# Patient Record
Sex: MALE | Race: WHITE | NOT HISPANIC OR LATINO | Employment: FULL TIME | URBAN - METROPOLITAN AREA
[De-identification: names, ages, dates, MRNs, and addresses within clinical notes are randomized per-mention and may not be internally consistent; named-entity substitution may affect disease eponyms.]

---

## 2017-01-12 RX ORDER — CITALOPRAM 20 MG/1
TABLET ORAL
Qty: 30 TABLET | Refills: 0 | Status: SHIPPED | OUTPATIENT
Start: 2017-01-12 | End: 2017-07-17 | Stop reason: SDUPTHER

## 2017-07-17 RX ORDER — CITALOPRAM 20 MG/1
TABLET ORAL
Qty: 30 TABLET | Refills: 0 | OUTPATIENT
Start: 2017-07-17

## 2017-07-17 RX ORDER — CITALOPRAM 20 MG/1
TABLET ORAL
Qty: 30 TABLET | Refills: 0 | Status: SHIPPED | OUTPATIENT
Start: 2017-07-17 | End: 2017-09-25 | Stop reason: SDUPTHER

## 2017-07-17 RX ORDER — ATORVASTATIN CALCIUM 10 MG/1
TABLET, FILM COATED ORAL
Qty: 90 TABLET | Refills: 0 | Status: SHIPPED | OUTPATIENT
Start: 2017-07-17 | End: 2018-11-13

## 2017-07-17 RX ORDER — ATORVASTATIN CALCIUM 10 MG/1
TABLET, FILM COATED ORAL
Qty: 90 TABLET | Refills: 0 | OUTPATIENT
Start: 2017-07-17

## 2017-07-17 RX ORDER — CARVEDILOL 25 MG/1
TABLET ORAL
Qty: 180 TABLET | Refills: 0 | OUTPATIENT
Start: 2017-07-17

## 2017-07-17 RX ORDER — CARVEDILOL 25 MG/1
TABLET ORAL
Qty: 180 TABLET | Refills: 0 | Status: SHIPPED | OUTPATIENT
Start: 2017-07-17 | End: 2018-11-13 | Stop reason: SDUPTHER

## 2017-07-17 RX ORDER — LISINOPRIL 20 MG/1
TABLET ORAL
Qty: 90 TABLET | Refills: 0 | OUTPATIENT
Start: 2017-07-17

## 2017-07-17 RX ORDER — LISINOPRIL 20 MG/1
TABLET ORAL
Qty: 90 TABLET | Refills: 0 | Status: SHIPPED | OUTPATIENT
Start: 2017-07-17 | End: 2018-04-30 | Stop reason: SDUPTHER

## 2017-08-29 ENCOUNTER — HOSPITAL ENCOUNTER (OUTPATIENT)
Dept: GENERAL RADIOLOGY | Facility: HOSPITAL | Age: 42
Discharge: HOME | End: 2017-08-29
Admitting: FAMILY MEDICINE

## 2017-08-29 ENCOUNTER — OFFICE VISIT (OUTPATIENT)
Dept: FAMILY MEDICINE CLINIC | Facility: CLINIC | Age: 42
End: 2017-08-29

## 2017-08-29 VITALS
TEMPERATURE: 98.6 F | HEIGHT: 72 IN | BODY MASS INDEX: 42.26 KG/M2 | OXYGEN SATURATION: 98 % | SYSTOLIC BLOOD PRESSURE: 134 MMHG | WEIGHT: 312 LBS | HEART RATE: 88 BPM | DIASTOLIC BLOOD PRESSURE: 88 MMHG

## 2017-08-29 DIAGNOSIS — R29.2 LEFT PATELLAR REFLEX DECREASED: ICD-10-CM

## 2017-08-29 DIAGNOSIS — M54.16 LUMBAR RADICULOPATHY: ICD-10-CM

## 2017-08-29 DIAGNOSIS — M54.41 ACUTE BILATERAL LOW BACK PAIN WITH BILATERAL SCIATICA: Primary | ICD-10-CM

## 2017-08-29 DIAGNOSIS — M54.41 ACUTE BILATERAL LOW BACK PAIN WITH BILATERAL SCIATICA: ICD-10-CM

## 2017-08-29 DIAGNOSIS — M54.42 ACUTE BILATERAL LOW BACK PAIN WITH BILATERAL SCIATICA: ICD-10-CM

## 2017-08-29 DIAGNOSIS — M54.42 ACUTE BILATERAL LOW BACK PAIN WITH BILATERAL SCIATICA: Primary | ICD-10-CM

## 2017-08-29 PROCEDURE — 72100 X-RAY EXAM L-S SPINE 2/3 VWS: CPT

## 2017-08-29 PROCEDURE — 99213 OFFICE O/P EST LOW 20 MIN: CPT | Performed by: FAMILY MEDICINE

## 2017-08-29 RX ORDER — HYDROCODONE BITARTRATE AND ACETAMINOPHEN 5; 325 MG/1; MG/1
1 TABLET ORAL EVERY 6 HOURS PRN
Qty: 30 TABLET | Refills: 0 | Status: SHIPPED | OUTPATIENT
Start: 2017-08-29 | End: 2017-09-21 | Stop reason: DRUGHIGH

## 2017-08-29 RX ORDER — METHYLPREDNISOLONE 4 MG/1
TABLET ORAL
Qty: 21 TABLET | Refills: 0 | Status: SHIPPED | OUTPATIENT
Start: 2017-08-29 | End: 2018-11-13

## 2017-08-29 RX ORDER — TIZANIDINE HYDROCHLORIDE 4 MG/1
4 CAPSULE, GELATIN COATED ORAL 3 TIMES DAILY
Qty: 30 CAPSULE | Refills: 0 | Status: SHIPPED | OUTPATIENT
Start: 2017-08-29 | End: 2018-11-13

## 2017-09-08 ENCOUNTER — HOSPITAL ENCOUNTER (OUTPATIENT)
Dept: MRI IMAGING | Facility: HOSPITAL | Age: 42
End: 2017-09-08

## 2017-09-20 ENCOUNTER — TELEPHONE (OUTPATIENT)
Dept: FAMILY MEDICINE CLINIC | Facility: CLINIC | Age: 42
End: 2017-09-20

## 2017-09-20 NOTE — TELEPHONE ENCOUNTER
Pt was seen recently for back pain, will have an MRI soon, but he is calling about his pain med and muscle relaxer that they are not helping his pain at all and wants something else for pain relief.

## 2017-09-21 RX ORDER — HYDROCODONE BITARTRATE AND ACETAMINOPHEN 10; 325 MG/1; MG/1
1 TABLET ORAL EVERY 6 HOURS PRN
Qty: 30 TABLET | Refills: 0 | Status: SHIPPED | OUTPATIENT
Start: 2017-09-21 | End: 2018-11-13

## 2017-09-25 RX ORDER — CITALOPRAM 20 MG/1
TABLET ORAL
Qty: 30 TABLET | Refills: 5 | Status: SHIPPED | OUTPATIENT
Start: 2017-09-25 | End: 2018-11-13 | Stop reason: DRUGHIGH

## 2018-04-30 RX ORDER — LISINOPRIL 20 MG/1
TABLET ORAL
Qty: 90 TABLET | Refills: 0 | Status: SHIPPED | OUTPATIENT
Start: 2018-04-30 | End: 2018-11-13 | Stop reason: SDUPTHER

## 2018-11-05 RX ORDER — CITALOPRAM 20 MG/1
TABLET ORAL
Qty: 30 TABLET | Refills: 5 | OUTPATIENT
Start: 2018-11-05

## 2018-11-05 RX ORDER — CARVEDILOL 25 MG/1
TABLET ORAL
Qty: 180 TABLET | Refills: 0 | OUTPATIENT
Start: 2018-11-05

## 2018-11-05 RX ORDER — LISINOPRIL 20 MG/1
TABLET ORAL
Qty: 90 TABLET | Refills: 0 | OUTPATIENT
Start: 2018-11-05

## 2018-11-13 ENCOUNTER — OFFICE VISIT (OUTPATIENT)
Dept: FAMILY MEDICINE CLINIC | Facility: CLINIC | Age: 43
End: 2018-11-13

## 2018-11-13 VITALS
HEART RATE: 112 BPM | TEMPERATURE: 98.2 F | OXYGEN SATURATION: 96 % | BODY MASS INDEX: 42.66 KG/M2 | WEIGHT: 315 LBS | RESPIRATION RATE: 18 BRPM | HEIGHT: 72 IN | SYSTOLIC BLOOD PRESSURE: 126 MMHG | DIASTOLIC BLOOD PRESSURE: 82 MMHG

## 2018-11-13 DIAGNOSIS — E66.01 MORBID EXOGENOUS OBESITY (HCC): ICD-10-CM

## 2018-11-13 DIAGNOSIS — E55.9 VITAMIN D DEFICIENCY: ICD-10-CM

## 2018-11-13 DIAGNOSIS — F41.8 DEPRESSION WITH ANXIETY: ICD-10-CM

## 2018-11-13 DIAGNOSIS — J01.00 ACUTE NON-RECURRENT MAXILLARY SINUSITIS: ICD-10-CM

## 2018-11-13 DIAGNOSIS — I10 HTN (HYPERTENSION), BENIGN: Primary | ICD-10-CM

## 2018-11-13 DIAGNOSIS — E78.2 MIXED HYPERLIPIDEMIA: ICD-10-CM

## 2018-11-13 DIAGNOSIS — E11.65 UNCONTROLLED TYPE 2 DIABETES MELLITUS WITH HYPERGLYCEMIA (HCC): ICD-10-CM

## 2018-11-13 PROCEDURE — 99214 OFFICE O/P EST MOD 30 MIN: CPT | Performed by: FAMILY MEDICINE

## 2018-11-13 RX ORDER — LISINOPRIL 20 MG/1
20 TABLET ORAL DAILY
Qty: 90 TABLET | Refills: 1 | Status: SHIPPED | OUTPATIENT
Start: 2018-11-13 | End: 2019-08-16 | Stop reason: SDUPTHER

## 2018-11-13 RX ORDER — AMOXICILLIN AND CLAVULANATE POTASSIUM 875; 125 MG/1; MG/1
1 TABLET, FILM COATED ORAL EVERY 12 HOURS SCHEDULED
Qty: 20 TABLET | Refills: 0 | Status: SHIPPED | OUTPATIENT
Start: 2018-11-13 | End: 2019-08-22

## 2018-11-13 RX ORDER — CARVEDILOL 25 MG/1
25 TABLET ORAL 2 TIMES DAILY
Qty: 180 TABLET | Refills: 1 | Status: SHIPPED | OUTPATIENT
Start: 2018-11-13 | End: 2019-08-16 | Stop reason: SDUPTHER

## 2018-11-13 RX ORDER — CITALOPRAM 40 MG/1
40 TABLET ORAL DAILY
Qty: 30 TABLET | Refills: 5 | Status: SHIPPED | OUTPATIENT
Start: 2018-11-13 | End: 2019-08-16 | Stop reason: SDUPTHER

## 2018-11-13 NOTE — PROGRESS NOTES
Subjective   Santiago Zamora is a 43 y.o. male with   Chief Complaint   Patient presents with   • Med Refill     look over med list unsure of what he is really taking   • Sinusitis     x's 3 days   .    History of Present Illness     44 yo white male who presents with complaints of head congestion and nosebleeds for the last few days.  Especially out of the left nares.  He states it does tend to happen this time of the year.  He only cough's occasionally and he has had no fever.  Pt's blood pressure is stable today.  He has a history of morbid exogenous obesity, he has gained 5 pounds since he was last seen one year ago.  Pt has not had fasting labs since June of 2015.      The following portions of the patient's history were reviewed and updated as appropriate: allergies, current medications, past family history, past medical history, past social history, past surgical history and problem list.    Review of Systems   Constitutional: Negative for fever.        Morbid obesity   HENT: Positive for congestion, nosebleeds, postnasal drip, sinus pressure and sore throat.    Respiratory: Positive for cough.    Cardiovascular:        Essential hypertension   Endocrine:        Type II non-insulin-dependent diabetes mellitus   Psychiatric/Behavioral: Positive for dysphoric mood. The patient is nervous/anxious.    All other systems reviewed and are negative.      Objective     Vitals:    11/13/18 1544   BP: 126/82   Pulse: 112   Resp: 18   Temp: 98.2 °F (36.8 °C)   SpO2: 96%     BP Readings from Last 3 Encounters:   11/13/18 126/82   08/29/17 134/88   03/02/16 140/80      Wt Readings from Last 3 Encounters:   11/13/18 (!) 144 kg (317 lb)   08/29/17 (!) 142 kg (312 lb)   03/02/16 (!) 145 kg (320 lb)        No results found for this or any previous visit (from the past 168 hour(s)).    Physical Exam   Constitutional: He is oriented to person, place, and time. Vital signs are normal. He appears well-developed and well-nourished.  He is cooperative.   morbidly obese   HENT:   Head: Normocephalic and atraumatic.   Right Ear: Hearing, tympanic membrane, external ear and ear canal normal.   Left Ear: Hearing, tympanic membrane, external ear and ear canal normal.   Nose: Mucosal edema (bilateral turbinate edema as well as increasederythema  with no recent bleeding observed.) present.   Mouth/Throat: Uvula is midline and mucous membranes are normal. Oropharyngeal exudate and posterior oropharyngeal erythema present.   Neck: Trachea normal and phonation normal. Neck supple. Normal carotid pulses present. Carotid bruit is not present. No thyroid mass and no thyromegaly present.   Cardiovascular: Normal rate, regular rhythm and normal heart sounds. Exam reveals no gallop and no friction rub.   No murmur heard.  Pulmonary/Chest: Effort normal and breath sounds normal. No respiratory distress. He has no decreased breath sounds. He has no wheezes. He has no rhonchi. He has no rales.   Lymphadenopathy:     He has no cervical adenopathy.   Neurological: He is alert and oriented to person, place, and time.   Skin: Skin is warm and dry. No rash noted.   Psychiatric: He has a normal mood and affect. His speech is normal and behavior is normal. Judgment and thought content normal. Cognition and memory are normal.   Nursing note and vitals reviewed.      Assessment/Plan   Santiago was seen today for med refill and sinusitis.    Diagnoses and all orders for this visit:    HTN (hypertension), benign  -     carvedilol (COREG) 25 MG tablet; Take 1 tablet by mouth 2 (Two) Times a Day.  -     lisinopril (PRINIVIL,ZESTRIL) 20 MG tablet; Take 1 tablet by mouth Daily.    Mixed hyperlipidemia  -     CBC & Differential  -     Comprehensive Metabolic Panel  -     Lipid Panel    Uncontrolled type 2 diabetes mellitus with hyperglycemia (CMS/HCC)  -     dapagliflozin (FARXIGA) 5 MG tablet tablet; Take 1 tablet by mouth Daily.  -     CBC & Differential  -     Comprehensive  Metabolic Panel  -     Hemoglobin A1c  -     SITagliptin (JANUVIA) 100 MG tablet; Take 1 tablet by mouth Daily.    Vitamin D deficiency  -     Vitamin D 25 Hydroxy    Depression with anxiety  -     citalopram (CELEXA) 40 MG tablet; Take 1 tablet by mouth Daily.    Morbid exogenous obesity (CMS/HCC)    Acute non-recurrent maxillary sinusitis  -     amoxicillin-clavulanate (AUGMENTIN) 875-125 MG per tablet; Take 1 tablet by mouth Every 12 (Twelve) Hours.        Return in about 6 months (around 5/13/2019).    Scribed for Quinn Stoner MD by Nimco Morales CMA. 11/13/2018    I, Quinn Stoner MD personally performed the services described in this documentation, as scribed by Nimco Morales CMA in my presence, and it is both accurate and complete

## 2018-11-14 LAB
25(OH)D3+25(OH)D2 SERPL-MCNC: 12.2 NG/ML
ALBUMIN SERPL-MCNC: 4.4 G/DL (ref 3.5–5.2)
ALBUMIN/GLOB SERPL: 1.6 G/DL
ALP SERPL-CCNC: 67 U/L (ref 40–129)
ALT SERPL-CCNC: 18 U/L (ref 5–41)
AST SERPL-CCNC: 10 U/L (ref 5–40)
BASOPHILS # BLD AUTO: 0.05 10*3/MM3 (ref 0–0.2)
BASOPHILS NFR BLD AUTO: 0.6 % (ref 0–2)
BILIRUB SERPL-MCNC: 0.4 MG/DL (ref 0.2–1.2)
BUN SERPL-MCNC: 12 MG/DL (ref 6–20)
BUN/CREAT SERPL: 15 (ref 7–25)
CALCIUM SERPL-MCNC: 9.7 MG/DL (ref 8.6–10.5)
CHLORIDE SERPL-SCNC: 95 MMOL/L (ref 98–107)
CHOLEST SERPL-MCNC: 167 MG/DL (ref 0–200)
CO2 SERPL-SCNC: 31.1 MMOL/L (ref 22–29)
CREAT SERPL-MCNC: 0.8 MG/DL (ref 0.76–1.27)
EOSINOPHIL # BLD AUTO: 0.34 10*3/MM3 (ref 0.1–0.3)
EOSINOPHIL NFR BLD AUTO: 4.2 % (ref 0–4)
ERYTHROCYTE [DISTWIDTH] IN BLOOD BY AUTOMATED COUNT: 15 % (ref 11.5–14.5)
GLOBULIN SER CALC-MCNC: 2.7 GM/DL
GLUCOSE SERPL-MCNC: 284 MG/DL (ref 65–99)
HBA1C MFR BLD: 9.9 % (ref 4.8–5.6)
HCT VFR BLD AUTO: 49.1 % (ref 42–52)
HDLC SERPL-MCNC: 35 MG/DL (ref 40–60)
HGB BLD-MCNC: 15.7 G/DL (ref 14–18)
IMM GRANULOCYTES # BLD: 0.03 10*3/MM3 (ref 0–0.03)
IMM GRANULOCYTES NFR BLD: 0.4 % (ref 0–0.5)
LDLC SERPL CALC-MCNC: 100 MG/DL (ref 0–100)
LYMPHOCYTES # BLD AUTO: 1.66 10*3/MM3 (ref 0.6–4.8)
LYMPHOCYTES NFR BLD AUTO: 20.3 % (ref 20–45)
MCH RBC QN AUTO: 26.4 PG (ref 27–31)
MCHC RBC AUTO-ENTMCNC: 32 G/DL (ref 31–37)
MCV RBC AUTO: 82.7 FL (ref 80–94)
MONOCYTES # BLD AUTO: 0.54 10*3/MM3 (ref 0–1)
MONOCYTES NFR BLD AUTO: 6.6 % (ref 3–8)
NEUTROPHILS # BLD AUTO: 5.54 10*3/MM3 (ref 1.5–8.3)
NEUTROPHILS NFR BLD AUTO: 67.9 % (ref 45–70)
NRBC BLD AUTO-RTO: 0 /100 WBC (ref 0–0)
PLATELET # BLD AUTO: 215 10*3/MM3 (ref 140–500)
POTASSIUM SERPL-SCNC: 5.3 MMOL/L (ref 3.5–5.2)
PROT SERPL-MCNC: 7.1 G/DL (ref 6–8.5)
RBC # BLD AUTO: 5.94 10*6/MM3 (ref 4.7–6.1)
SODIUM SERPL-SCNC: 136 MMOL/L (ref 136–145)
TRIGL SERPL-MCNC: 162 MG/DL (ref 0–150)
VLDLC SERPL CALC-MCNC: 32.4 MG/DL (ref 8–32)
WBC # BLD AUTO: 8.16 10*3/MM3 (ref 4.8–10.8)

## 2019-08-16 DIAGNOSIS — F41.8 DEPRESSION WITH ANXIETY: ICD-10-CM

## 2019-08-16 DIAGNOSIS — I10 HTN (HYPERTENSION), BENIGN: ICD-10-CM

## 2019-08-16 RX ORDER — LISINOPRIL 20 MG/1
TABLET ORAL
Qty: 90 TABLET | Refills: 0 | Status: SHIPPED | OUTPATIENT
Start: 2019-08-16 | End: 2019-08-22

## 2019-08-16 RX ORDER — CARVEDILOL 25 MG/1
TABLET ORAL
Qty: 180 TABLET | Refills: 0 | Status: SHIPPED | OUTPATIENT
Start: 2019-08-16 | End: 2020-03-25 | Stop reason: SDUPTHER

## 2019-08-16 RX ORDER — CITALOPRAM 40 MG/1
TABLET ORAL
Qty: 90 TABLET | Refills: 0 | Status: SHIPPED | OUTPATIENT
Start: 2019-08-16 | End: 2020-02-24

## 2019-08-22 ENCOUNTER — OFFICE VISIT (OUTPATIENT)
Dept: FAMILY MEDICINE CLINIC | Facility: CLINIC | Age: 44
End: 2019-08-22

## 2019-08-22 ENCOUNTER — HOSPITAL ENCOUNTER (OUTPATIENT)
Dept: GENERAL RADIOLOGY | Facility: HOSPITAL | Age: 44
Discharge: HOME | End: 2019-08-22
Admitting: FAMILY MEDICINE

## 2019-08-22 VITALS
WEIGHT: 306 LBS | BODY MASS INDEX: 41.45 KG/M2 | HEIGHT: 72 IN | SYSTOLIC BLOOD PRESSURE: 130 MMHG | OXYGEN SATURATION: 87 % | HEART RATE: 87 BPM | DIASTOLIC BLOOD PRESSURE: 84 MMHG

## 2019-08-22 DIAGNOSIS — M25.551 PAIN OF RIGHT HIP JOINT: Primary | ICD-10-CM

## 2019-08-22 DIAGNOSIS — M54.16 LUMBAR RADICULOPATHY: ICD-10-CM

## 2019-08-22 DIAGNOSIS — E66.01 MORBID EXOGENOUS OBESITY (HCC): ICD-10-CM

## 2019-08-22 DIAGNOSIS — M51.36 DDD (DEGENERATIVE DISC DISEASE), LUMBAR: ICD-10-CM

## 2019-08-22 PROCEDURE — 99213 OFFICE O/P EST LOW 20 MIN: CPT | Performed by: FAMILY MEDICINE

## 2019-08-22 PROCEDURE — 73502 X-RAY EXAM HIP UNI 2-3 VIEWS: CPT

## 2019-08-22 RX ORDER — LISINOPRIL AND HYDROCHLOROTHIAZIDE 20; 12.5 MG/1; MG/1
1 TABLET ORAL DAILY
COMMUNITY
End: 2019-08-22

## 2019-08-22 RX ORDER — SPIRONOLACTONE 25 MG/1
25 TABLET ORAL DAILY
COMMUNITY
End: 2019-08-22

## 2019-08-22 RX ORDER — CYCLOBENZAPRINE HCL 10 MG
10 TABLET ORAL 3 TIMES DAILY PRN
Qty: 30 TABLET | Refills: 0 | Status: SHIPPED | OUTPATIENT
Start: 2019-08-22 | End: 2020-03-25 | Stop reason: SDUPTHER

## 2019-08-22 RX ORDER — LISINOPRIL 20 MG/1
20 TABLET ORAL DAILY
COMMUNITY
End: 2020-02-24

## 2019-08-22 NOTE — PATIENT INSTRUCTIONS
Right hip x-rays will be obtained and if found to be unremarkable will consider MRI of the lumbar spine.  If MRI results in an unremarkable reading will consider nerve conduction studies of lower extremities.  If x-rays of the right hip are positive we will discuss orthopedic referral for same.

## 2019-08-22 NOTE — PROGRESS NOTES
Subjective   Santiago Zamora is a 44 y.o. male with   Chief Complaint   Patient presents with   • Back Pain     radiates into both legs, right is worse then left   .    History of Present Illness   44-year-old white male with low back pain with radiation into the legs bilaterally.  Right is much worse than left.  Right radicular symptoms are for the most part in the proximal inguinal area including right hip and extends into the right anterior thigh region.  At times this can also radiate distally down towards right ankle and foot.  Left radicular pain into the anterior lateral portion of the left thigh.  Patient has not had any recent trauma or initiating event but does have a past history dating to before 2017 of low back issues.  X-rays in 2017 of the lumbar spine with mild degeneration.  MRI was ordered in August 2017 and not performed for some reason.  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  Apparently the setting has not helped in this regard.  He does request some sort of muscle relaxer to help with his symptoms.  The following portions of the patient's history were reviewed and updated as appropriate: allergies, current medications, past family history, past medical history, past social history, past surgical history and problem list.    Review of Systems   Musculoskeletal: Positive for arthralgias, back pain and gait problem.       Objective     Vitals:    08/22/19 0933   BP: 130/84   Pulse: 87   SpO2: (!) 87%       No results found for this or any previous visit (from the past 672 hour(s)).    Physical Exam   Constitutional: He is oriented to person, place, and time. He appears well-developed and well-nourished.   Morbid exogenous obesity   HENT:   Head: Normocephalic and atraumatic.   Neck: Neck supple.   Musculoskeletal:   LS spine with generalized tenderness in the L4-S1 region with palpation.  Range of motion is limited in all  planes secondary to pain.  No evidence of SI tenderness or sciatic notch tenderness.  Bilateral lower extremities with good tone, motor 5/5 and neurovascular intact distally.  Straight leg raising is negative with the exclusion of some low back pain at 60 degrees bilaterally.  Mook is full with marked extreme tenderness on the right.   Neurological: He is alert and oriented to person, place, and time. He has normal strength and normal reflexes. No sensory deficit. Gait normal.   Skin: Skin is warm and dry. No rash noted.   Psychiatric: He has a normal mood and affect. His speech is normal and behavior is normal. Judgment and thought content normal. Cognition and memory are normal.   Nursing note and vitals reviewed.      Assessment/Plan   Santiago was seen today for back pain.    Diagnoses and all orders for this visit:    Pain of right hip joint  -     XR Hip With or Without Pelvis 2 - 3 View Right  -     cyclobenzaprine (FLEXERIL) 10 MG tablet; Take 1 tablet by mouth 3 (Three) Times a Day As Needed for Muscle Spasms.    DDD (degenerative disc disease), lumbar    Lumbar radiculopathy    Morbid exogenous obesity (CMS/HCC)        Return if symptoms worsen or fail to improve.

## 2019-08-27 DIAGNOSIS — M16.0 ARTHRITIS OF BOTH HIPS: Primary | ICD-10-CM

## 2020-02-24 DIAGNOSIS — F41.8 DEPRESSION WITH ANXIETY: ICD-10-CM

## 2020-02-24 RX ORDER — CITALOPRAM 40 MG/1
TABLET ORAL
Qty: 30 TABLET | Refills: 0 | Status: SHIPPED | OUTPATIENT
Start: 2020-02-24 | End: 2020-03-25 | Stop reason: SDUPTHER

## 2020-02-24 RX ORDER — LISINOPRIL 20 MG/1
TABLET ORAL
Qty: 30 TABLET | Refills: 0 | Status: SHIPPED | OUTPATIENT
Start: 2020-02-24 | End: 2020-03-25 | Stop reason: SDUPTHER

## 2020-03-17 ENCOUNTER — TELEPHONE (OUTPATIENT)
Dept: FAMILY MEDICINE CLINIC | Facility: CLINIC | Age: 45
End: 2020-03-17

## 2020-03-18 DIAGNOSIS — E55.9 VITAMIN D DEFICIENCY: ICD-10-CM

## 2020-03-18 DIAGNOSIS — E11.65 UNCONTROLLED TYPE 2 DIABETES MELLITUS WITH HYPERGLYCEMIA (HCC): ICD-10-CM

## 2020-03-18 DIAGNOSIS — E78.2 MIXED HYPERLIPIDEMIA: Primary | ICD-10-CM

## 2020-03-18 LAB
25(OH)D3+25(OH)D2 SERPL-MCNC: 9.6 NG/ML (ref 30–100)
ALBUMIN SERPL-MCNC: 4.5 G/DL (ref 3.5–5.2)
ALBUMIN/GLOB SERPL: 1.7 G/DL
ALP SERPL-CCNC: 67 U/L (ref 39–117)
ALT SERPL-CCNC: 22 U/L (ref 1–41)
AST SERPL-CCNC: 13 U/L (ref 1–40)
BASOPHILS # BLD AUTO: 0.07 10*3/MM3 (ref 0–0.2)
BASOPHILS NFR BLD AUTO: 1 % (ref 0–1.5)
BILIRUB SERPL-MCNC: 0.4 MG/DL (ref 0.2–1.2)
BUN SERPL-MCNC: 16 MG/DL (ref 6–20)
BUN/CREAT SERPL: 18.4 (ref 7–25)
CALCIUM SERPL-MCNC: 10 MG/DL (ref 8.6–10.5)
CHLORIDE SERPL-SCNC: 96 MMOL/L (ref 98–107)
CHOLEST SERPL-MCNC: 231 MG/DL (ref 0–200)
CO2 SERPL-SCNC: 28.6 MMOL/L (ref 22–29)
CREAT SERPL-MCNC: 0.87 MG/DL (ref 0.76–1.27)
EOSINOPHIL # BLD AUTO: 0.29 10*3/MM3 (ref 0–0.4)
EOSINOPHIL NFR BLD AUTO: 4 % (ref 0.3–6.2)
ERYTHROCYTE [DISTWIDTH] IN BLOOD BY AUTOMATED COUNT: 14.7 % (ref 12.3–15.4)
GLOBULIN SER CALC-MCNC: 2.6 GM/DL
GLUCOSE SERPL-MCNC: 216 MG/DL (ref 65–99)
HBA1C MFR BLD: 10.1 % (ref 4.8–5.6)
HCT VFR BLD AUTO: 49.3 % (ref 37.5–51)
HDLC SERPL-MCNC: 30 MG/DL (ref 40–60)
HGB BLD-MCNC: 16.4 G/DL (ref 13–17.7)
IMM GRANULOCYTES # BLD AUTO: 0.05 10*3/MM3 (ref 0–0.05)
IMM GRANULOCYTES NFR BLD AUTO: 0.7 % (ref 0–0.5)
LDLC SERPL CALC-MCNC: 126 MG/DL (ref 0–100)
LYMPHOCYTES # BLD AUTO: 1.47 10*3/MM3 (ref 0.7–3.1)
LYMPHOCYTES NFR BLD AUTO: 20.2 % (ref 19.6–45.3)
MCH RBC QN AUTO: 26.8 PG (ref 26.6–33)
MCHC RBC AUTO-ENTMCNC: 33.3 G/DL (ref 31.5–35.7)
MCV RBC AUTO: 80.7 FL (ref 79–97)
MONOCYTES # BLD AUTO: 0.59 10*3/MM3 (ref 0.1–0.9)
MONOCYTES NFR BLD AUTO: 8.1 % (ref 5–12)
NEUTROPHILS # BLD AUTO: 4.79 10*3/MM3 (ref 1.7–7)
NEUTROPHILS NFR BLD AUTO: 66 % (ref 42.7–76)
NRBC BLD AUTO-RTO: 0 /100 WBC (ref 0–0.2)
PLATELET # BLD AUTO: 209 10*3/MM3 (ref 140–450)
POTASSIUM SERPL-SCNC: 4.9 MMOL/L (ref 3.5–5.2)
PROT SERPL-MCNC: 7.1 G/DL (ref 6–8.5)
RBC # BLD AUTO: 6.11 10*6/MM3 (ref 4.14–5.8)
SODIUM SERPL-SCNC: 137 MMOL/L (ref 136–145)
TRIGL SERPL-MCNC: 373 MG/DL (ref 0–150)
UNABLE TO VOID: NORMAL
VLDLC SERPL CALC-MCNC: 74.6 MG/DL
WBC # BLD AUTO: 7.26 10*3/MM3 (ref 3.4–10.8)

## 2020-03-25 ENCOUNTER — OFFICE VISIT (OUTPATIENT)
Dept: FAMILY MEDICINE CLINIC | Facility: CLINIC | Age: 45
End: 2020-03-25

## 2020-03-25 VITALS
BODY MASS INDEX: 41.04 KG/M2 | OXYGEN SATURATION: 98 % | WEIGHT: 303 LBS | SYSTOLIC BLOOD PRESSURE: 134 MMHG | HEART RATE: 92 BPM | HEIGHT: 72 IN | DIASTOLIC BLOOD PRESSURE: 80 MMHG

## 2020-03-25 DIAGNOSIS — E66.01 MORBID EXOGENOUS OBESITY (HCC): ICD-10-CM

## 2020-03-25 DIAGNOSIS — E78.2 MIXED HYPERLIPIDEMIA: ICD-10-CM

## 2020-03-25 DIAGNOSIS — M79.10 MYALGIA: ICD-10-CM

## 2020-03-25 DIAGNOSIS — I10 BENIGN ESSENTIAL HTN: ICD-10-CM

## 2020-03-25 DIAGNOSIS — M12.9 ARTHRITIS, MULTIPLE JOINT INVOLVEMENT: ICD-10-CM

## 2020-03-25 DIAGNOSIS — E11.65 UNCONTROLLED TYPE 2 DIABETES MELLITUS WITH HYPERGLYCEMIA (HCC): Primary | ICD-10-CM

## 2020-03-25 DIAGNOSIS — M25.551 PAIN OF RIGHT HIP JOINT: ICD-10-CM

## 2020-03-25 DIAGNOSIS — F41.8 DEPRESSION WITH ANXIETY: ICD-10-CM

## 2020-03-25 DIAGNOSIS — E55.9 VITAMIN D DEFICIENCY: ICD-10-CM

## 2020-03-25 DIAGNOSIS — I10 HTN (HYPERTENSION), BENIGN: ICD-10-CM

## 2020-03-25 PROCEDURE — 99214 OFFICE O/P EST MOD 30 MIN: CPT | Performed by: FAMILY MEDICINE

## 2020-03-25 RX ORDER — LISINOPRIL 20 MG/1
20 TABLET ORAL DAILY
Qty: 90 TABLET | Refills: 1 | Status: SHIPPED | OUTPATIENT
Start: 2020-03-25 | End: 2020-12-14

## 2020-03-25 RX ORDER — CYCLOBENZAPRINE HCL 10 MG
10 TABLET ORAL 3 TIMES DAILY PRN
Qty: 30 TABLET | Refills: 0 | Status: SHIPPED | OUTPATIENT
Start: 2020-03-25 | End: 2021-08-06

## 2020-03-25 RX ORDER — BLOOD-GLUCOSE METER
KIT MISCELLANEOUS
Qty: 1 EACH | Refills: 0 | Status: SHIPPED | OUTPATIENT
Start: 2020-03-25 | End: 2022-05-17 | Stop reason: SDUPTHER

## 2020-03-25 RX ORDER — ATORVASTATIN CALCIUM 10 MG/1
10 TABLET, FILM COATED ORAL DAILY
Qty: 90 TABLET | Refills: 1 | Status: SHIPPED | OUTPATIENT
Start: 2020-03-25 | End: 2021-04-12

## 2020-03-25 RX ORDER — CARVEDILOL 25 MG/1
25 TABLET ORAL 2 TIMES DAILY
Qty: 180 TABLET | Refills: 1 | Status: SHIPPED | OUTPATIENT
Start: 2020-03-25 | End: 2020-12-14

## 2020-03-25 RX ORDER — BLOOD-GLUCOSE METER
KIT MISCELLANEOUS
Qty: 1 EACH | Refills: 0 | Status: SHIPPED | OUTPATIENT
Start: 2020-03-25 | End: 2020-03-25 | Stop reason: SDUPTHER

## 2020-03-25 RX ORDER — CITALOPRAM 40 MG/1
40 TABLET ORAL DAILY
Qty: 90 TABLET | Refills: 1 | Status: SHIPPED | OUTPATIENT
Start: 2020-03-25 | End: 2020-12-14

## 2020-03-25 NOTE — PROGRESS NOTES
Subjective   Santiago Zamora is a 45 y.o. male with   Chief Complaint   Patient presents with   • Diabetes   • Generalized Body Aches   .    History of Present Illness   45-year-old white male with ongoing history of type II non-insulin-dependent diabetes mellitus, hypertension and morbid exogenous obesity.  Current medications include 81 mg aspirin daily, carvedilol, citalopram, Farxiga, lisinopril, and sitagliptin.  Patient is intolerant of metformin.  He is relatively noncompliant in regards to diet and exercise and also with acquiring laboratory studies and follow-up in this office.  He does complain of generalized myalgias and is concerned that he may have fibromyalgia.  His mother apparently has been severe with this problem and is actually bedridden.  These myalgias have been present over several years and to date there has not been a formal evaluation.  Fasting labs have been acquired prior to this visit.  Current medications are tolerated without side effects and used on a regular basis.  The following portions of the patient's history were reviewed and updated as appropriate: allergies, current medications, past family history, past medical history, past social history, past surgical history and problem list.    Review of Systems   Constitutional:        Morbidly obese   Cardiovascular:        Hypertension   Endocrine:        Type II non-insulin-dependent diabetes mellitus   Musculoskeletal: Positive for myalgias.       Objective     Vitals:    03/25/20 0736   BP: 134/80   Pulse: 92   SpO2: 98%       Recent Results (from the past 672 hour(s))   CBC & Differential    Collection Time: 03/18/20  9:01 AM   Result Value Ref Range    WBC 7.26 3.40 - 10.80 10*3/mm3    RBC 6.11 (H) 4.14 - 5.80 10*6/mm3    Hemoglobin 16.4 13.0 - 17.7 g/dL    Hematocrit 49.3 37.5 - 51.0 %    MCV 80.7 79.0 - 97.0 fL    MCH 26.8 26.6 - 33.0 pg    MCHC 33.3 31.5 - 35.7 g/dL    RDW 14.7 12.3 - 15.4 %    Platelets 209 140 - 450 10*3/mm3     Neutrophil Rel % 66.0 42.7 - 76.0 %    Lymphocyte Rel % 20.2 19.6 - 45.3 %    Monocyte Rel % 8.1 5.0 - 12.0 %    Eosinophil Rel % 4.0 0.3 - 6.2 %    Basophil Rel % 1.0 0.0 - 1.5 %    Neutrophils Absolute 4.79 1.70 - 7.00 10*3/mm3    Lymphocytes Absolute 1.47 0.70 - 3.10 10*3/mm3    Monocytes Absolute 0.59 0.10 - 0.90 10*3/mm3    Eosinophils Absolute 0.29 0.00 - 0.40 10*3/mm3    Basophils Absolute 0.07 0.00 - 0.20 10*3/mm3    Immature Granulocyte Rel % 0.7 (H) 0.0 - 0.5 %    Immature Grans Absolute 0.05 0.00 - 0.05 10*3/mm3    nRBC 0.0 0.0 - 0.2 /100 WBC   Comprehensive Metabolic Panel    Collection Time: 03/18/20  9:01 AM   Result Value Ref Range    Glucose 216 (H) 65 - 99 mg/dL    BUN 16 6 - 20 mg/dL    Creatinine 0.87 0.76 - 1.27 mg/dL    eGFR Non African Am 96 >60 mL/min/1.73    eGFR African Am 116 >60 mL/min/1.73    BUN/Creatinine Ratio 18.4 7.0 - 25.0    Sodium 137 136 - 145 mmol/L    Potassium 4.9 3.5 - 5.2 mmol/L    Chloride 96 (L) 98 - 107 mmol/L    Total CO2 28.6 22.0 - 29.0 mmol/L    Calcium 10.0 8.6 - 10.5 mg/dL    Total Protein 7.1 6.0 - 8.5 g/dL    Albumin 4.50 3.50 - 5.20 g/dL    Globulin 2.6 gm/dL    A/G Ratio 1.7 g/dL    Total Bilirubin 0.4 0.2 - 1.2 mg/dL    Alkaline Phosphatase 67 39 - 117 U/L    AST (SGOT) 13 1 - 40 U/L    ALT (SGPT) 22 1 - 41 U/L   Hemoglobin A1c    Collection Time: 03/18/20  9:01 AM   Result Value Ref Range    Hemoglobin A1C 10.10 (H) 4.80 - 5.60 %   Lipid Panel    Collection Time: 03/18/20  9:01 AM   Result Value Ref Range    Total Cholesterol 231 (H) 0 - 200 mg/dL    Triglycerides 373 (H) 0 - 150 mg/dL    HDL Cholesterol 30 (L) 40 - 60 mg/dL    VLDL Cholesterol 74.6 mg/dL    LDL Cholesterol  126 (H) 0 - 100 mg/dL   Vitamin D 25 Hydroxy    Collection Time: 03/18/20  9:01 AM   Result Value Ref Range    25 Hydroxy, Vitamin D 9.6 (L) 30.0 - 100.0 ng/ml   Unable To Void    Collection Time: 03/18/20  9:01 AM   Result Value Ref Range    Unable to Void Comment        Physical Exam    Constitutional: He is oriented to person, place, and time. He appears well-developed and well-nourished.   HENT:   Head: Normocephalic and atraumatic.   Neck: Trachea normal and phonation normal. Neck supple. Normal carotid pulses present. Carotid bruit is not present. No thyroid mass and no thyromegaly present.   Cardiovascular: Normal rate, regular rhythm and normal heart sounds. Exam reveals no gallop and no friction rub.   No murmur heard.  Pulmonary/Chest: Effort normal and breath sounds normal. No respiratory distress. He has no decreased breath sounds. He has no wheezes. He has no rhonchi. He has no rales.   Lymphadenopathy:     He has no cervical adenopathy.   Neurological: He is alert and oriented to person, place, and time.   Skin: Skin is warm and dry. No rash noted.   Psychiatric: He has a normal mood and affect. His speech is normal and behavior is normal. Judgment and thought content normal. Cognition and memory are normal.   Nursing note and vitals reviewed.      Assessment/Plan   Santiago was seen today for diabetes and generalized body aches.    Diagnoses and all orders for this visit:    Uncontrolled type 2 diabetes mellitus with hyperglycemia (CMS/HCC)  -     CK  -     Uric acid  -     RPR  -     LINDSEY  -     Hepatitis C antibody  -     Rheumatoid Factor, Quant  -     Sedimentation Rate  -     Insulin Glargine-Lixisenatide (Soliqua) 100-33 UNT-MCG/ML solution pen-injector injection; 15 units daily for 1 week, with titration by 2 to 4 units/week if not to goal.  -     glucose monitor monitoring kit; Use accu-chek Rebecca glucometer to check your blood sugar once daily  -     glucose blood (Accu-Chek Rebecca) test strip; Test blood sugar once daily as instructed  -     1st Choice Lancets Super Thin misc; USE TO TEST GLUCOSE DAILY    Morbid exogenous obesity (CMS/HCC)  -     CK  -     Uric acid  -     RPR  -     LINDSEY  -     Hepatitis C antibody  -     Rheumatoid Factor, Quant  -     Sedimentation  Rate    Vitamin D deficiency  -     CK  -     Uric acid  -     RPR  -     LINDSEY  -     Hepatitis C antibody  -     Rheumatoid Factor, Quant  -     Sedimentation Rate    Benign essential HTN  -     CK  -     Uric acid  -     RPR  -     LINDSEY  -     Hepatitis C antibody  -     Rheumatoid Factor, Quant  -     Sedimentation Rate  -     lisinopril (PRINIVIL,ZESTRIL) 20 MG tablet; Take 1 tablet by mouth Daily.    Depression with anxiety  -     CK  -     Uric acid  -     RPR  -     LINDSEY  -     Hepatitis C antibody  -     Rheumatoid Factor, Quant  -     Sedimentation Rate  -     citalopram (CeleXA) 40 MG tablet; Take 1 tablet by mouth Daily.    Mixed hyperlipidemia  -     atorvastatin (LIPITOR) 10 MG tablet; Take 1 tablet by mouth Daily.  -     CK  -     Uric acid  -     RPR  -     LINDSEY  -     Hepatitis C antibody  -     Rheumatoid Factor, Quant  -     Sedimentation Rate    Myalgia  -     CK  -     Uric acid  -     RPR  -     LINDSEY  -     Hepatitis C antibody  -     Rheumatoid Factor, Quant  -     Sedimentation Rate    Arthritis, multiple joint involvement  -     CK  -     Uric acid  -     RPR  -     LINDSEY  -     Hepatitis C antibody  -     Rheumatoid Factor, Quant  -     Sedimentation Rate    Pain of right hip joint  -     cyclobenzaprine (FLEXERIL) 10 MG tablet; Take 1 tablet by mouth 3 (Three) Times a Day As Needed for Muscle Spasms.    HTN (hypertension), benign  -     carvedilol (COREG) 25 MG tablet; Take 1 tablet by mouth 2 (Two) Times a Day.    Soliqua will be added to patient's regimen.  Januvia will be continued initially with eventual discontinuation when Soliqua has been titrated to goal.  All other medications will be continued without alteration.  Patient has been asked to check fasting blood sugars by glucometer 2-3 times a week with follow-up with me in 4 weeks with these numbers.  Rheumatologic labs have been drawn on this date nonfasting.    Return in about 4 weeks (around 4/22/2020) for Recheck.

## 2020-03-26 LAB
ANA SER QL: NEGATIVE
CK SERPL-CCNC: 67 U/L (ref 20–200)
ERYTHROCYTE [SEDIMENTATION RATE] IN BLOOD BY WESTERGREN METHOD: 4 MM/HR (ref 0–15)
HCV AB S/CO SERPL IA: <0.1 S/CO RATIO (ref 0–0.9)
RHEUMATOID FACT SERPL-ACNC: <10 IU/ML (ref 0–13.9)
RPR SER QL: NORMAL
URATE SERPL-MCNC: 6.2 MG/DL (ref 3.4–7)

## 2020-04-29 ENCOUNTER — TELEPHONE (OUTPATIENT)
Dept: FAMILY MEDICINE CLINIC | Facility: CLINIC | Age: 45
End: 2020-04-29

## 2020-04-29 NOTE — TELEPHONE ENCOUNTER
Okay to give but he did miss a video/telephone visit within the last week.  This needs to be made up.

## 2020-04-29 NOTE — TELEPHONE ENCOUNTER
PT CALLED WANTING MORE SAMPLES OF JANUVIA AND FOR THE INSULIN PIN. PT ALSO DOESN'T HAVE NEEDLES FOR PINS.    PLEASE ADVISE     PT CALL BACK  [phone]

## 2020-04-30 ENCOUNTER — OFFICE VISIT (OUTPATIENT)
Dept: FAMILY MEDICINE CLINIC | Facility: CLINIC | Age: 45
End: 2020-04-30

## 2020-04-30 DIAGNOSIS — E11.65 UNCONTROLLED TYPE 2 DIABETES MELLITUS WITH HYPERGLYCEMIA (HCC): Primary | ICD-10-CM

## 2020-04-30 DIAGNOSIS — E66.01 MORBID EXOGENOUS OBESITY (HCC): ICD-10-CM

## 2020-04-30 PROCEDURE — 99442 PR PHYS/QHP TELEPHONE EVALUATION 11-20 MIN: CPT | Performed by: FAMILY MEDICINE

## 2020-04-30 NOTE — PROGRESS NOTES
Subjective   Santiago Zamora is a 45 y.o. male with No chief complaint on file.  .    History of Present Illness   45-year-old white male with consent to telephone visit in regards to uncontrolled type 2 diabetes mellitus.  Patient has been using Farxiga at 5 mg daily as well as Januvia at 100 mg daily.  He has run out of both of these medication since last visit.  He has insurance but has no idea how his insurance works or if he has a high deductible.  He is also using Soliqua and initiated this product at 15 units daily.  He has now worked up to 19 units/day.  He has not checked his sugars in more than a week as he is very busy at work and travels a lot.  Last week when he last checked a sugar he stated it was beginning to come down-has no idea what the actual number is.  Patient with long history of morbid exogenous obesity and poor dietary control.  Patient has no formal exercise pattern.  The following portions of the patient's history were reviewed and updated as appropriate: allergies, current medications, past family history, past medical history, past social history, past surgical history and problem list.    Review of Systems   Constitutional:        Morbid exogenous obesity   Endocrine:        Uncontrolled type 2 diabetes mellitus       Objective     There were no vitals filed for this visit.    No results found for this or any previous visit (from the past 672 hour(s)).    Physical Exam   Constitutional: He is oriented to person, place, and time.   Neurological: He is alert and oriented to person, place, and time.   Psychiatric: He has a normal mood and affect. His speech is normal and behavior is normal. Judgment and thought content normal. Cognition and memory are normal.   Further exam is not possible given mode of visit.    Assessment/Plan   Diagnoses and all orders for this visit:    Uncontrolled type 2 diabetes mellitus with hyperglycemia (CMS/[facility])  -     SITagliptin (Januvia) 100 MG tablet; Take 1  tablet by mouth Daily.  -     Insulin Glargine-Lixisenatide (Soliqua) 100-33 UNT-MCG/ML solution pen-injector injection; 15 units daily for 1 week, with titration by 2 to 4 units/week if not to goal.  -     Ertugliflozin L-PyroglutamicAc (Steglatro) 5 MG tablet; Take 1 tablet by mouth Every Morning.    Morbid exogenous obesity (CMS/[facility])    Consent to telephone visit required 15 minutes for completion.  Patient will be asked to return to this office physically in 6 weeks with glucometer number readings asking for at least 3 fasting blood sugars per week.    Return in about 6 weeks (around 6/11/2020).

## 2020-07-20 ENCOUNTER — TELEPHONE (OUTPATIENT)
Dept: FAMILY MEDICINE CLINIC | Facility: CLINIC | Age: 45
End: 2020-07-20

## 2020-07-20 NOTE — TELEPHONE ENCOUNTER
TANIA CALLED IN AND WANTED TO KNOW IF WE HAD SAMPLES OF  HIS INSULIN AND  THE OTHER TWO TANIA STATED HE GETS THESE SAMPLES FROM DOCTOR FERNANDEZ EVERY MONTH .       PLEASE CALL TANIA -[phone].

## 2020-07-20 NOTE — TELEPHONE ENCOUNTER
TANIA CALLED IN AND WANTED TO KNOW IF WE HAD SAMPLES OF  HIS INSULIN AND  THE OTHER TWO TANIA STATED HE GETS THESE SAMPLES FROM DOCTOR FERNANDEZ EVERY MONTH .         PLEASE CALL TANIA -[phone].

## 2020-07-20 NOTE — TELEPHONE ENCOUNTER
TANIA CALLED IN AND WANTED TO KNOW IF WE HAD SAMPLES OF  HIS INSULIN AND  THE OTHER TWO TANIA STATED HE GETS THESE SAMPLES FROM DOCTOR FERNANDEZ EVERY MONTH .         PLEASE CALL TANIA -[phone].

## 2020-07-20 NOTE — TELEPHONE ENCOUNTER
Left detailed msg for pts wife that whomever is picking them up will need to call in and be screened before we can allow them in the office, calling the same day they are coming and they must wear a mask.

## 2020-08-25 ENCOUNTER — HOSPITAL ENCOUNTER (EMERGENCY)
Facility: HOSPITAL | Age: 45
End: 2020-08-25

## 2020-08-25 ENCOUNTER — HOSPITAL ENCOUNTER (EMERGENCY)
Facility: HOSPITAL | Age: 45
Discharge: HOME | End: 2020-08-25
Attending: EMERGENCY MEDICINE | Admitting: EMERGENCY MEDICINE

## 2020-08-25 VITALS
SYSTOLIC BLOOD PRESSURE: 142 MMHG | HEART RATE: 98 BPM | RESPIRATION RATE: 20 BRPM | OXYGEN SATURATION: 97 % | HEIGHT: 72 IN | DIASTOLIC BLOOD PRESSURE: 98 MMHG | BODY MASS INDEX: 40.36 KG/M2 | WEIGHT: 298 LBS | TEMPERATURE: 97.1 F

## 2020-08-25 DIAGNOSIS — I83.891 BLEEDING FROM VARICOSE VEINS OF RIGHT LOWER EXTREMITY: Primary | ICD-10-CM

## 2020-08-25 PROCEDURE — 99282 EMERGENCY DEPT VISIT SF MDM: CPT | Performed by: EMERGENCY MEDICINE

## 2020-08-25 PROCEDURE — 99283 EMERGENCY DEPT VISIT LOW MDM: CPT

## 2020-08-25 RX ADMIN — THROMBIN, TOPICAL (BOVINE) 5000 UNITS: KIT at 22:19

## 2020-08-26 NOTE — ED PROVIDER NOTES
"Subjective   Santiago Zamora is a 45-year-old white male who presents secondary to a bleeding varicose vein.  Patient states he had a superficial injury and a scab on his right lower extremity that is been there for several days.  This evening he \"picking at it\".  Patient peeled off the scab and immediately began bleeding from the varicose vein underneath.  Patient has applied pressure but was unable to control the bleeding at home.  He presents for evaluation.      History provided by:  Patient      Review of Systems   Constitutional: Negative for fever.   HENT: Negative for rhinorrhea.    Eyes: Negative for redness.   Respiratory: Negative for cough.    Cardiovascular: Negative for chest pain.   Gastrointestinal: Negative for abdominal pain.   Genitourinary: Negative for dysuria.   Musculoskeletal: Negative for back pain.   Skin: Negative for rash.   Neurological: Negative for syncope.   All other systems reviewed and are negative.      Past Medical History:   Diagnosis Date   • Anxiety    • Cardiomyopathy (CMS/HCC)    • Depression    • Diabetes mellitus (CMS/HCC)    • Varicose veins        Allergies   Allergen Reactions   • Eggs Or Egg-Derived Products Swelling   • Fish-Derived Products Swelling       Past Surgical History:   Procedure Laterality Date   • CHOLECYSTECTOMY         Family History   Problem Relation Age of Onset   • Hypertension Mother    • Atrial fibrillation Father    • Heart attack Father    • Diabetes Other        Social History     Socioeconomic History   • Marital status:      Spouse name: Not on file   • Number of children: Not on file   • Years of education: Not on file   • Highest education level: Not on file   Tobacco Use   • Smoking status: Never Smoker   • Smokeless tobacco: Never Used   Substance and Sexual Activity   • Alcohol use: No   • Drug use: No   • Sexual activity: Defer           Objective   Physical Exam   Constitutional: He is oriented to person, place, and time. He " appears well-developed and well-nourished. No distress.   45-year-old white male laying in bed.  Patient is obese.  He appears in fair overall health.  Vital signs notable for BP of 142/98.  Otherwise unremarkable    Musculoskeletal:        Legs:  Neurological: He is alert and oriented to person, place, and time.   Skin: Skin is warm and dry. He is not diaphoretic.   Psychiatric: He has a normal mood and affect. His behavior is normal.   Nursing note and vitals reviewed.      Procedures     Pressure dressing was applied by the nurse.  This did not control bleeding.  Thrombin soaked 2 x 2's were then placed with a pressure dressing.  Leg was elevated.  This controlled the bleeding.      ED Course  ED Course as of Aug 26 0022   Tue Aug 25, 2020   2219 Patient has varicose vein squirting blood.  Applying thrombin and a pressure dressing.    [SS]   2323 Bleeding appears controlled.  Discussed at length with patient wound care and indications return to the emergency room.  Will DC home.    [SS]      ED Course User Index  [SS] Reynaldo Go MD          My diagnosis for lower extremity pain and injury includes but is not limited to hip fracture, femur fracture, hip dislocation, hip contusion, hip sprain, hip strain, pelvic fracture, knee sprain, patella dislocation, knee dislocation, internal derangement of knee, fractures of the femur, tibia, fibula, ankle, foot and digits, ankle sprain, ankle dislocation, Lisfranc fracture, fracture dislocations of the digits, pulmonary embolism, claudication, peripheral vascular disease, gout, osteoarthritis, rheumatoid arthritis, bursitis, septic joint, poly-rheumatica, polyarthralgia and other inflammatory or infectious disease processes.                                    MDM    Final diagnoses:   Bleeding from varicose veins of right lower extremity            Reynaldo Go MD  08/26/20 0022

## 2020-08-26 NOTE — ED NOTES
Ace bandage removed and guaze soaked thrombin remains intact with no evidence of active bleeding. Ace bandage reapplied. Distal pulses palpable. No numbness/tingling, no pain. Pt instructed to leave ace on for approx 2-3 days per Dr Go. Instructed to lossen ace bandage if foot becomes tingly.        Mar Rich, RN  08/25/20 3580

## 2020-08-26 NOTE — ED NOTES
Dressing remains intact with no saturation at this time. Distal Pulses palpable. Pt resting and in NAD.      Mar Rich, RN  08/25/20 5461

## 2020-08-26 NOTE — DISCHARGE INSTRUCTIONS
Leave dressing in place for 48 to 72 hours.  Then gently removed.  Recommend saturating the dressing with warm water prior to removal.  Then apply bacitracin or Neosporin 3 times daily.  Follow-up with your PCP as above.  Return to ED for recurrent bleeding, signs of infection, medical emergencies

## 2020-08-27 ENCOUNTER — HOSPITAL ENCOUNTER (EMERGENCY)
Facility: HOSPITAL | Age: 45
Discharge: HOME | End: 2020-08-27
Attending: EMERGENCY MEDICINE | Admitting: EMERGENCY MEDICINE

## 2020-08-27 ENCOUNTER — TELEPHONE (OUTPATIENT)
Dept: FAMILY MEDICINE CLINIC | Facility: CLINIC | Age: 45
End: 2020-08-27

## 2020-08-27 VITALS
SYSTOLIC BLOOD PRESSURE: 160 MMHG | HEIGHT: 72 IN | OXYGEN SATURATION: 97 % | DIASTOLIC BLOOD PRESSURE: 115 MMHG | HEART RATE: 108 BPM | BODY MASS INDEX: 40.36 KG/M2 | WEIGHT: 298 LBS | RESPIRATION RATE: 16 BRPM | TEMPERATURE: 98.9 F

## 2020-08-27 DIAGNOSIS — I83.891 BLEEDING FROM VARICOSE VEINS OF LOWER EXTREMITY, RIGHT: Primary | ICD-10-CM

## 2020-08-27 DIAGNOSIS — R03.0 SINGLE EPISODE OF ELEVATED BLOOD PRESSURE: ICD-10-CM

## 2020-08-27 PROCEDURE — 12001 RPR S/N/AX/GEN/TRNK 2.5CM/<: CPT | Performed by: EMERGENCY MEDICINE

## 2020-08-27 PROCEDURE — 99282 EMERGENCY DEPT VISIT SF MDM: CPT

## 2020-08-27 RX ORDER — DOXYCYCLINE 100 MG/1
100 CAPSULE ORAL 2 TIMES DAILY
Qty: 20 CAPSULE | Refills: 0 | Status: SHIPPED | OUTPATIENT
Start: 2020-08-27 | End: 2020-09-09

## 2020-08-27 RX ORDER — LIDOCAINE HYDROCHLORIDE AND EPINEPHRINE BITARTRATE 20; .01 MG/ML; MG/ML
10 INJECTION, SOLUTION SUBCUTANEOUS ONCE
Status: COMPLETED | OUTPATIENT
Start: 2020-08-27 | End: 2020-08-27

## 2020-08-27 RX ADMIN — LIDOCAINE HYDROCHLORIDE,EPINEPHRINE BITARTRATE 10 ML: 20; .01 INJECTION, SOLUTION INFILTRATION; PERINEURAL at 13:17

## 2020-08-27 NOTE — TELEPHONE ENCOUNTER
Caller: Suzy Zamora    Relationship to patient: Emergency Contact    Best call back number: [phone]    Chief complaint: PT'S VARICOSE VEIN BURST AND WAS BLEEDING HEAVILY    Patient directed to call 911 or go to their nearest emergency room.     Patient verbalized understanding: [x] Yes  [] No      Additional notes: PT WAS SEEN AT ER FOR SAME CONDITION EARLIER THIS WEEK

## 2020-08-31 ENCOUNTER — TELEPHONE (OUTPATIENT)
Dept: FAMILY MEDICINE CLINIC | Facility: CLINIC | Age: 45
End: 2020-08-31

## 2020-08-31 NOTE — TELEPHONE ENCOUNTER
Spoke with pts wife regarding his samples, we do not have the Steglatro, Januvia or Soliqua.  Do you want to send in scripts?  His wife doesn't think insurance will cover them

## 2020-08-31 NOTE — TELEPHONE ENCOUNTER
Patient recently was in the ER. He had a varicose vein pop, and stitched back up. The patient would like to know if we could remove the stitches here, or if he should go to a vascular specialist. He would also like a refill of his insulin.

## 2020-09-09 ENCOUNTER — OFFICE VISIT (OUTPATIENT)
Dept: FAMILY MEDICINE CLINIC | Facility: CLINIC | Age: 45
End: 2020-09-09

## 2020-09-09 VITALS
HEIGHT: 72 IN | TEMPERATURE: 97.8 F | BODY MASS INDEX: 41.85 KG/M2 | DIASTOLIC BLOOD PRESSURE: 82 MMHG | WEIGHT: 309 LBS | SYSTOLIC BLOOD PRESSURE: 136 MMHG | HEART RATE: 81 BPM | OXYGEN SATURATION: 98 %

## 2020-09-09 DIAGNOSIS — E11.65 UNCONTROLLED TYPE 2 DIABETES MELLITUS WITH HYPERGLYCEMIA (HCC): ICD-10-CM

## 2020-09-09 DIAGNOSIS — S81.811D LACERATION OF RIGHT LOWER LEG, SUBSEQUENT ENCOUNTER: Primary | ICD-10-CM

## 2020-09-09 PROCEDURE — 99213 OFFICE O/P EST LOW 20 MIN: CPT | Performed by: FAMILY MEDICINE

## 2020-09-09 RX ORDER — METFORMIN HYDROCHLORIDE 750 MG/1
TABLET, EXTENDED RELEASE ORAL
Qty: 60 TABLET | Refills: 1 | Status: SHIPPED | OUTPATIENT
Start: 2020-09-09 | End: 2021-05-10

## 2020-09-11 NOTE — PROGRESS NOTES
Subjective   Santiago Zamora is a 45 y.o. male with   Chief Complaint   Patient presents with   • Suture / Staple Removal   • Diabetes     discuss meds   .    History of Present Illness   45-year-old white male with recent laceration to the lateral portion of right leg while weed eating in the yard.  Incident occurred 2 weeks ago and apparently hit a varicose vein.  Lesion would not stop bleeding requiring ER visit and suture placement.  Sutures have been in place for 2 weeks with patient here for wound evaluation and suture removal.  Patient also with type II non-insulin-dependent diabetes mellitus and is having difficulty acquiring medicines due to his poor insurance plan.  He is requesting samples if any are available.  The following portions of the patient's history were reviewed and updated as appropriate: allergies, current medications, past family history, past medical history, past social history, past surgical history and problem list.    Review of Systems   Endocrine:        Type II non-insulin-dependent diabetes mellitus   Skin: Positive for wound.       Objective     Vitals:    09/09/20 1108   BP: 136/82   Pulse: 81   Temp: 97.8 °F (36.6 °C)   SpO2: 98%       No results found for this or any previous visit (from the past 672 hour(s)).    Physical Exam   Constitutional: He is oriented to person, place, and time. He appears well-developed and well-nourished.   Morbid exogenous obesity with a BMI of 41.9   HENT:   Head: Normocephalic and atraumatic.   Neck: Trachea normal and phonation normal. Neck supple. Normal carotid pulses present. Carotid bruit is not present. No thyroid mass and no thyromegaly present.   Cardiovascular: Normal rate, regular rhythm and normal heart sounds. Exam reveals no gallop and no friction rub.   No murmur heard.  Pulmonary/Chest: Effort normal and breath sounds normal. No respiratory distress. He has no decreased breath sounds. He has no wheezes. He has no rhonchi. He has no  rales.   Lymphadenopathy:     He has no cervical adenopathy.   Neurological: He is alert and oriented to person, place, and time.   Skin: Skin is warm and dry. No rash noted.   Right lower extremity laceration well-healed with sutures easily removed.  Dry dressing placed.   Psychiatric: He has a normal mood and affect. His speech is normal and behavior is normal. Judgment and thought content normal. Cognition and memory are normal.   Nursing note and vitals reviewed.      Assessment/Plan   Santiago was seen today for suture / staple removal and diabetes.    Diagnoses and all orders for this visit:    Laceration of right lower leg, subsequent encounter    Uncontrolled type 2 diabetes mellitus with hyperglycemia (CMS/[facility])  -     metFORMIN ER (Glucophage XR) 750 MG 24 hr tablet; 2 po qd        Return in about 4 weeks (around 10/7/2020) for Recheck.

## 2020-11-24 ENCOUNTER — TELEPHONE (OUTPATIENT)
Dept: FAMILY MEDICINE CLINIC | Facility: CLINIC | Age: 45
End: 2020-11-24

## 2020-11-24 NOTE — TELEPHONE ENCOUNTER
Caller: Suzy Zamora    Relationship: Emergency Contact    Best call back number: [phone]    Medication needed:     metFORMIN ER (Glucophage XR) 750 MG 24 hr tablet   atorvastatin (LIPITOR) 10 MG tablet  10 mg, Daily     Insulin Glargine-Lixisenatide (Soliqua) 100-33 UNT-MCG/ML solution pen-injector injection      When do you need the refill by: 11/25/20  What details did the patient provide when requesting the medication:    PATIENT IS OUT OF THE INSULIN. NEEDS MORE SAMPLES OF THIS AND THE OTHER MEDICATIONS     PLEASE ADVISE   Does the patient have less than a 3 day supply:  [x] Yes  [] No

## 2020-11-25 NOTE — TELEPHONE ENCOUNTER
Pts wife states he needs Steglatro and Januvia, not Metformin and Atorvastatin.    We do not have the Stelgatro which she is aware of but there is Januvia and Soliqua (Soliqua is in fridge) put up front for him.

## 2020-12-14 DIAGNOSIS — F41.8 DEPRESSION WITH ANXIETY: ICD-10-CM

## 2020-12-14 DIAGNOSIS — I10 BENIGN ESSENTIAL HTN: ICD-10-CM

## 2020-12-14 DIAGNOSIS — I10 HTN (HYPERTENSION), BENIGN: ICD-10-CM

## 2020-12-14 RX ORDER — CITALOPRAM 40 MG/1
TABLET ORAL
Qty: 90 TABLET | Refills: 0 | Status: SHIPPED | OUTPATIENT
Start: 2020-12-14 | End: 2021-05-10 | Stop reason: SDUPTHER

## 2020-12-14 RX ORDER — CARVEDILOL 25 MG/1
TABLET ORAL
Qty: 180 TABLET | Refills: 0 | Status: SHIPPED | OUTPATIENT
Start: 2020-12-14 | End: 2021-05-10 | Stop reason: SDUPTHER

## 2020-12-14 RX ORDER — LISINOPRIL 20 MG/1
TABLET ORAL
Qty: 90 TABLET | Refills: 0 | Status: SHIPPED | OUTPATIENT
Start: 2020-12-14 | End: 2021-05-10 | Stop reason: SDUPTHER

## 2021-04-11 DIAGNOSIS — E78.2 MIXED HYPERLIPIDEMIA: ICD-10-CM

## 2021-04-12 ENCOUNTER — TELEPHONE (OUTPATIENT)
Dept: FAMILY MEDICINE CLINIC | Facility: CLINIC | Age: 46
End: 2021-04-12

## 2021-04-12 DIAGNOSIS — E11.65 UNCONTROLLED TYPE 2 DIABETES MELLITUS WITH HYPERGLYCEMIA (HCC): ICD-10-CM

## 2021-04-12 RX ORDER — ATORVASTATIN CALCIUM 10 MG/1
TABLET, FILM COATED ORAL
Qty: 30 TABLET | Refills: 0 | Status: SHIPPED | OUTPATIENT
Start: 2021-04-12 | End: 2021-05-10 | Stop reason: SDUPTHER

## 2021-04-12 NOTE — TELEPHONE ENCOUNTER
Caller: Santiago Zamora    Relationship: Self    Best call back number: [phone]    MEDICATION NEEDED:   FARXIA 5 MG  JANUVIA 100 MG      When do you need the refill by: 04/12/21    What additional details did the patient provide when requesting the medication: PATIENT STATES HE HAS BEEN OUT OF THE MEDICATIONS FOR A FEW DAYS.    Does the patient have less than a 3 day supply:  [x] Yes  [] No          PLEASE ADVISE.

## 2021-04-19 NOTE — TELEPHONE ENCOUNTER
Needs to go to [employer] website.  There is a coupon he can use which helps with cost of medication.  Usually around $5.00 a month. Any other alternate medication will be just as expensive.    Only other option will be to wait and discuss with Dr. Stoner on Wednesday.

## 2021-04-19 NOTE — TELEPHONE ENCOUNTER
Patients wife Suzy called and states they never heard back into regards of samples of [id number] and Farxiga 5mg.     We are currently out of those samples.     Is there something else the patient can be prescribed because the above medication is not covered by insurance and is very expensive.     PLEASE ADVISE

## 2021-05-10 ENCOUNTER — OFFICE VISIT (OUTPATIENT)
Dept: FAMILY MEDICINE CLINIC | Facility: CLINIC | Age: 46
End: 2021-05-10

## 2021-05-10 DIAGNOSIS — I50.9 CHRONIC CONGESTIVE HEART FAILURE, UNSPECIFIED HEART FAILURE TYPE (HCC): ICD-10-CM

## 2021-05-10 DIAGNOSIS — E11.65 UNCONTROLLED TYPE 2 DIABETES MELLITUS WITH HYPERGLYCEMIA (HCC): Primary | ICD-10-CM

## 2021-05-10 DIAGNOSIS — E66.01 MORBID EXOGENOUS OBESITY (HCC): ICD-10-CM

## 2021-05-10 DIAGNOSIS — I10 HTN (HYPERTENSION), BENIGN: ICD-10-CM

## 2021-05-10 DIAGNOSIS — E55.9 VITAMIN D DEFICIENCY: ICD-10-CM

## 2021-05-10 DIAGNOSIS — F41.8 DEPRESSION WITH ANXIETY: ICD-10-CM

## 2021-05-10 DIAGNOSIS — E78.2 MIXED HYPERLIPIDEMIA: ICD-10-CM

## 2021-05-10 DIAGNOSIS — I10 BENIGN ESSENTIAL HTN: ICD-10-CM

## 2021-05-10 PROCEDURE — 99213 OFFICE O/P EST LOW 20 MIN: CPT | Performed by: FAMILY MEDICINE

## 2021-05-10 RX ORDER — LISINOPRIL 20 MG/1
20 TABLET ORAL DAILY
Qty: 90 TABLET | Refills: 1 | Status: SHIPPED | OUTPATIENT
Start: 2021-05-10 | End: 2021-12-15

## 2021-05-10 RX ORDER — DAPAGLIFLOZIN 10 MG/1
TABLET, FILM COATED ORAL
Qty: 30 TABLET | Refills: 5 | Status: SHIPPED | OUTPATIENT
Start: 2021-05-10 | End: 2022-03-30 | Stop reason: SDUPTHER

## 2021-05-10 RX ORDER — ATORVASTATIN CALCIUM 10 MG/1
10 TABLET, FILM COATED ORAL DAILY
Qty: 90 TABLET | Refills: 1 | Status: SHIPPED | OUTPATIENT
Start: 2021-05-10 | End: 2022-01-18 | Stop reason: SDUPTHER

## 2021-05-10 RX ORDER — CARVEDILOL 25 MG/1
25 TABLET ORAL 2 TIMES DAILY
Qty: 180 TABLET | Refills: 1 | Status: SHIPPED | OUTPATIENT
Start: 2021-05-10 | End: 2022-01-18 | Stop reason: SDUPTHER

## 2021-05-10 RX ORDER — CITALOPRAM 40 MG/1
40 TABLET ORAL DAILY
Qty: 90 TABLET | Refills: 1 | Status: SHIPPED | OUTPATIENT
Start: 2021-05-10 | End: 2021-12-15

## 2021-05-11 DIAGNOSIS — N52.9 ERECTILE DYSFUNCTION, UNSPECIFIED ERECTILE DYSFUNCTION TYPE: Primary | ICD-10-CM

## 2021-05-11 RX ORDER — TADALAFIL 5 MG/1
TABLET ORAL
Qty: 30 TABLET | Refills: 5 | Status: SHIPPED | OUTPATIENT
Start: 2021-05-11 | End: 2021-05-13 | Stop reason: SDUPTHER

## 2021-05-11 NOTE — PROGRESS NOTES
Subjective   Santiago Zamora is a 46 y.o. male with No chief complaint on file.  .    History of Present Illness   46-year-old white male with consented telephone visit here regards for further medical management of type II non-insulin-dependent diabetes mellitus.  Patient also with history of hypertension, hyperlipidemia and congestive cardiac failure.  There is also a history of vitamin D deficiency and morbid exogenous obesity.  He does admit to depression with anxiety features.  Current medications include atorvastatin at 10 mg daily, carvedilol 25 mg twice daily, citalopram at 40 mg daily with lisinopril at 20 mg daily.  Patient is intolerant to Metformin causing headaches.  He had been using combination Januvia and Farxiga but has run out of samples.  He does not check sugars at home with glucometer.  Last labs were in September of last year.  The following portions of the patient's history were reviewed and updated as appropriate: allergies, current medications, past family history, past medical history, past social history, past surgical history and problem list.    Review of Systems   Constitutional:        Morbid exogenous obesity   Cardiovascular:        Hypertension, hyperlipidemia CHF   Endocrine:        Type II non-insulin-dependent diabetes mellitus   Psychiatric/Behavioral: Positive for dysphoric mood. The patient is nervous/anxious.    All other systems reviewed and are negative.      Objective     There were no vitals filed for this visit.    No results found for this or any previous visit (from the past 672 hour(s)).    Physical Exam  Neurological:      Mental Status: He is alert and oriented to person, place, and time.   Psychiatric:         Attention and Perception: Attention and perception normal.         Mood and Affect: Mood and affect normal.         Speech: Speech normal.         Behavior: Behavior normal. Behavior is cooperative.         Thought Content: Thought content normal.          Cognition and Memory: Cognition and memory normal.         Judgment: Judgment normal.     Further exam is not possible given this mode of visit.  Assessment/Plan   Diagnoses and all orders for this visit:    1. Uncontrolled type 2 diabetes mellitus with hyperglycemia (CMS/[facility]) (Primary)  -     SITagliptin (Januvia) 100 MG tablet; Take 1 tablet by mouth Daily.  Dispense: 30 tablet; Refill: 5  -     Dapagliflozin Propanediol (Farxiga) 10 MG tablet; 1 po q am  Dispense: 30 tablet; Refill: 5  -     Hemoglobin A1c; Future  -     Comprehensive metabolic panel; Future  -     TSH; Future  -     Vitamin D 25 hydroxy; Future  -     Lipid panel; Future  -     CBC w AUTO Differential; Future    2. Benign essential HTN  -     lisinopril (PRINIVIL,ZESTRIL) 20 MG tablet; Take 1 tablet by mouth Daily.  Dispense: 90 tablet; Refill: 1  -     Hemoglobin A1c; Future  -     Comprehensive metabolic panel; Future  -     TSH; Future  -     Vitamin D 25 hydroxy; Future  -     Lipid panel; Future  -     CBC w AUTO Differential; Future    3. Chronic congestive heart failure, unspecified heart failure type (CMS/[facility])  -     Hemoglobin A1c; Future  -     Comprehensive metabolic panel; Future  -     TSH; Future  -     Vitamin D 25 hydroxy; Future  -     Lipid panel; Future  -     CBC w AUTO Differential; Future    4. Mixed hyperlipidemia  -     atorvastatin (LIPITOR) 10 MG tablet; Take 1 tablet by mouth Daily.  Dispense: 90 tablet; Refill: 1  -     Hemoglobin A1c; Future  -     Comprehensive metabolic panel; Future  -     TSH; Future  -     Vitamin D 25 hydroxy; Future  -     Lipid panel; Future  -     CBC w AUTO Differential; Future    5. Morbid exogenous obesity (CMS/[facility])  -     Hemoglobin A1c; Future  -     Comprehensive metabolic panel; Future  -     TSH; Future  -     Vitamin D 25 hydroxy; Future  -     Lipid panel; Future  -     CBC w AUTO Differential; Future    6. Vitamin D deficiency  -     Hemoglobin A1c; Future  -     Comprehensive  metabolic panel; Future  -     TSH; Future  -     Vitamin D 25 hydroxy; Future  -     Lipid panel; Future  -     CBC w AUTO Differential; Future    7. Depression with anxiety  -     citalopram (CeleXA) 40 MG tablet; Take 1 tablet by mouth Daily.  Dispense: 90 tablet; Refill: 1  -     Hemoglobin A1c; Future  -     Comprehensive metabolic panel; Future  -     TSH; Future  -     Vitamin D 25 hydroxy; Future  -     Lipid panel; Future  -     CBC w AUTO Differential; Future    8. HTN (hypertension), benign  -     carvedilol (COREG) 25 MG tablet; Take 1 tablet by mouth 2 (Two) Times a Day.  Dispense: 180 tablet; Refill: 1  -     Hemoglobin A1c; Future  -     Comprehensive metabolic panel; Future  -     TSH; Future  -     Vitamin D 25 hydroxy; Future  -     Lipid panel; Future  -     CBC w AUTO Differential; Future    Consented video visit required 20 minutes for completion.    Return in about 3 months (around 8/10/2021) for Recheck.

## 2021-05-12 DIAGNOSIS — N52.9 ERECTILE DYSFUNCTION, UNSPECIFIED ERECTILE DYSFUNCTION TYPE: ICD-10-CM

## 2021-05-13 DIAGNOSIS — N52.9 ERECTILE DYSFUNCTION, UNSPECIFIED ERECTILE DYSFUNCTION TYPE: ICD-10-CM

## 2021-05-13 RX ORDER — TADALAFIL 5 MG/1
TABLET ORAL
Qty: 30 TABLET | Refills: 5 | OUTPATIENT
Start: 2021-05-13

## 2021-05-13 RX ORDER — TADALAFIL 5 MG/1
TABLET ORAL
Qty: 30 TABLET | Refills: 5 | Status: SHIPPED | OUTPATIENT
Start: 2021-05-13 | End: 2022-01-18 | Stop reason: SDUPTHER

## 2021-08-06 DIAGNOSIS — M25.551 PAIN OF RIGHT HIP JOINT: ICD-10-CM

## 2021-08-06 RX ORDER — CYCLOBENZAPRINE HCL 10 MG
10 TABLET ORAL 3 TIMES DAILY PRN
Qty: 30 TABLET | Refills: 0 | Status: SHIPPED | OUTPATIENT
Start: 2021-08-06 | End: 2021-09-13 | Stop reason: SDUPTHER

## 2021-09-13 ENCOUNTER — TELEPHONE (OUTPATIENT)
Dept: FAMILY MEDICINE CLINIC | Facility: CLINIC | Age: 46
End: 2021-09-13

## 2021-09-13 DIAGNOSIS — M25.551 PAIN OF RIGHT HIP JOINT: ICD-10-CM

## 2021-09-13 RX ORDER — CYCLOBENZAPRINE HCL 10 MG
10 TABLET ORAL 3 TIMES DAILY PRN
Qty: 90 TABLET | Refills: 2 | Status: SHIPPED | OUTPATIENT
Start: 2021-09-13 | End: 2022-01-18 | Stop reason: SDUPTHER

## 2021-09-14 NOTE — TELEPHONE ENCOUNTER
Pts wife said she knows they wont cover Jardiance, she will call and check on the [employer] and let me know.

## 2021-12-14 DIAGNOSIS — I10 BENIGN ESSENTIAL HTN: ICD-10-CM

## 2021-12-14 DIAGNOSIS — F41.8 DEPRESSION WITH ANXIETY: ICD-10-CM

## 2021-12-15 RX ORDER — CITALOPRAM 40 MG/1
40 TABLET ORAL DAILY
Qty: 30 TABLET | Refills: 0 | Status: SHIPPED | OUTPATIENT
Start: 2021-12-15 | End: 2022-01-18 | Stop reason: SDUPTHER

## 2021-12-15 RX ORDER — LISINOPRIL 20 MG/1
20 TABLET ORAL DAILY
Qty: 30 TABLET | Refills: 0 | Status: SHIPPED | OUTPATIENT
Start: 2021-12-15 | End: 2022-01-18 | Stop reason: SDUPTHER

## 2022-01-18 DIAGNOSIS — I10 BENIGN ESSENTIAL HTN: ICD-10-CM

## 2022-01-18 DIAGNOSIS — F41.8 DEPRESSION WITH ANXIETY: ICD-10-CM

## 2022-01-18 DIAGNOSIS — N52.9 ERECTILE DYSFUNCTION, UNSPECIFIED ERECTILE DYSFUNCTION TYPE: ICD-10-CM

## 2022-01-18 DIAGNOSIS — M25.551 PAIN OF RIGHT HIP JOINT: ICD-10-CM

## 2022-01-18 DIAGNOSIS — E78.2 MIXED HYPERLIPIDEMIA: ICD-10-CM

## 2022-01-18 DIAGNOSIS — I10 HTN (HYPERTENSION), BENIGN: ICD-10-CM

## 2022-01-18 NOTE — TELEPHONE ENCOUNTER
Patient is scheduled for 02/02 for labs and 02/07 for an office visit.  He is going out of town for his job on 01/22 and will be out of meds.  He asked if Dr. Stoner could refill enough meds until his office visit?

## 2022-01-19 RX ORDER — CARVEDILOL 25 MG/1
25 TABLET ORAL 2 TIMES DAILY
Qty: 60 TABLET | Refills: 0 | Status: SHIPPED | OUTPATIENT
Start: 2022-01-19 | End: 2022-03-30 | Stop reason: SDUPTHER

## 2022-01-19 RX ORDER — CITALOPRAM 40 MG/1
40 TABLET ORAL DAILY
Qty: 30 TABLET | Refills: 0 | Status: SHIPPED | OUTPATIENT
Start: 2022-01-19 | End: 2022-03-30 | Stop reason: SDUPTHER

## 2022-01-19 RX ORDER — TADALAFIL 5 MG/1
TABLET ORAL
Qty: 30 TABLET | Refills: 0 | Status: SHIPPED | OUTPATIENT
Start: 2022-01-19 | End: 2022-04-02 | Stop reason: SDUPTHER

## 2022-01-19 RX ORDER — CYCLOBENZAPRINE HCL 10 MG
10 TABLET ORAL 3 TIMES DAILY PRN
Qty: 90 TABLET | Refills: 0 | Status: SHIPPED | OUTPATIENT
Start: 2022-01-19 | End: 2022-06-16 | Stop reason: ALTCHOICE

## 2022-01-19 RX ORDER — ATORVASTATIN CALCIUM 10 MG/1
10 TABLET, FILM COATED ORAL DAILY
Qty: 30 TABLET | Refills: 0 | Status: SHIPPED | OUTPATIENT
Start: 2022-01-19 | End: 2022-03-30 | Stop reason: SDUPTHER

## 2022-01-19 RX ORDER — LISINOPRIL 20 MG/1
20 TABLET ORAL DAILY
Qty: 30 TABLET | Refills: 0 | Status: SHIPPED | OUTPATIENT
Start: 2022-01-19 | End: 2022-03-30 | Stop reason: SDUPTHER

## 2022-01-19 RX ORDER — TADALAFIL 5 MG/1
TABLET ORAL
Qty: 30 TABLET | Refills: 5 | OUTPATIENT
Start: 2022-01-19

## 2022-03-21 DIAGNOSIS — N52.9 ERECTILE DYSFUNCTION, UNSPECIFIED ERECTILE DYSFUNCTION TYPE: ICD-10-CM

## 2022-03-21 DIAGNOSIS — E78.2 MIXED HYPERLIPIDEMIA: ICD-10-CM

## 2022-03-21 RX ORDER — TADALAFIL 5 MG/1
TABLET ORAL
Qty: 30 TABLET | Refills: 0 | OUTPATIENT
Start: 2022-03-21

## 2022-03-21 RX ORDER — ATORVASTATIN CALCIUM 10 MG/1
TABLET, FILM COATED ORAL
Qty: 30 TABLET | Refills: 0 | OUTPATIENT
Start: 2022-03-21

## 2022-03-28 DIAGNOSIS — I10 HTN (HYPERTENSION), BENIGN: ICD-10-CM

## 2022-03-28 DIAGNOSIS — N52.9 ERECTILE DYSFUNCTION, UNSPECIFIED ERECTILE DYSFUNCTION TYPE: ICD-10-CM

## 2022-03-28 DIAGNOSIS — I10 BENIGN ESSENTIAL HTN: ICD-10-CM

## 2022-03-28 DIAGNOSIS — M25.551 PAIN OF RIGHT HIP JOINT: ICD-10-CM

## 2022-03-28 DIAGNOSIS — E78.2 MIXED HYPERLIPIDEMIA: ICD-10-CM

## 2022-03-28 DIAGNOSIS — F41.8 DEPRESSION WITH ANXIETY: ICD-10-CM

## 2022-03-28 DIAGNOSIS — E11.65 UNCONTROLLED TYPE 2 DIABETES MELLITUS WITH HYPERGLYCEMIA: ICD-10-CM

## 2022-03-28 RX ORDER — ATORVASTATIN CALCIUM 10 MG/1
10 TABLET, FILM COATED ORAL DAILY
Qty: 30 TABLET | Refills: 0 | OUTPATIENT
Start: 2022-03-28

## 2022-03-28 RX ORDER — CYCLOBENZAPRINE HCL 10 MG
10 TABLET ORAL 3 TIMES DAILY PRN
Qty: 90 TABLET | Refills: 0 | OUTPATIENT
Start: 2022-03-28

## 2022-03-28 RX ORDER — CARVEDILOL 25 MG/1
25 TABLET ORAL 2 TIMES DAILY
Qty: 60 TABLET | Refills: 0 | OUTPATIENT
Start: 2022-03-28

## 2022-03-28 RX ORDER — ATORVASTATIN CALCIUM 10 MG/1
TABLET, FILM COATED ORAL
Qty: 30 TABLET | Refills: 0 | OUTPATIENT
Start: 2022-03-28

## 2022-03-28 RX ORDER — TADALAFIL 5 MG/1
TABLET ORAL
Qty: 30 TABLET | Refills: 0 | OUTPATIENT
Start: 2022-03-28

## 2022-03-28 RX ORDER — CITALOPRAM 40 MG/1
40 TABLET ORAL DAILY
Qty: 30 TABLET | Refills: 0 | OUTPATIENT
Start: 2022-03-28

## 2022-03-28 RX ORDER — CARVEDILOL 25 MG/1
TABLET ORAL
Qty: 60 TABLET | Refills: 0 | OUTPATIENT
Start: 2022-03-28

## 2022-03-28 RX ORDER — LISINOPRIL 20 MG/1
20 TABLET ORAL DAILY
Qty: 30 TABLET | Refills: 0 | OUTPATIENT
Start: 2022-03-28

## 2022-03-30 DIAGNOSIS — F41.8 DEPRESSION WITH ANXIETY: ICD-10-CM

## 2022-03-30 DIAGNOSIS — E78.2 MIXED HYPERLIPIDEMIA: ICD-10-CM

## 2022-03-30 DIAGNOSIS — I10 HTN (HYPERTENSION), BENIGN: ICD-10-CM

## 2022-03-30 DIAGNOSIS — E11.65 UNCONTROLLED TYPE 2 DIABETES MELLITUS WITH HYPERGLYCEMIA: ICD-10-CM

## 2022-03-30 DIAGNOSIS — I10 BENIGN ESSENTIAL HTN: ICD-10-CM

## 2022-03-30 RX ORDER — LISINOPRIL 20 MG/1
20 TABLET ORAL DAILY
Qty: 30 TABLET | Refills: 0 | Status: SHIPPED | OUTPATIENT
Start: 2022-03-30 | End: 2022-03-31 | Stop reason: SDUPTHER

## 2022-03-30 RX ORDER — CARVEDILOL 25 MG/1
25 TABLET ORAL 2 TIMES DAILY
Qty: 60 TABLET | Refills: 0 | Status: SHIPPED | OUTPATIENT
Start: 2022-03-30 | End: 2022-03-31 | Stop reason: SDUPTHER

## 2022-03-30 RX ORDER — DAPAGLIFLOZIN 10 MG/1
TABLET, FILM COATED ORAL
Qty: 30 TABLET | Refills: 0 | Status: SHIPPED | OUTPATIENT
Start: 2022-03-30 | End: 2022-03-31 | Stop reason: ALTCHOICE

## 2022-03-30 RX ORDER — CITALOPRAM 40 MG/1
40 TABLET ORAL DAILY
Qty: 30 TABLET | Refills: 0 | Status: SHIPPED | OUTPATIENT
Start: 2022-03-30 | End: 2022-03-31 | Stop reason: SDUPTHER

## 2022-03-30 RX ORDER — ATORVASTATIN CALCIUM 10 MG/1
10 TABLET, FILM COATED ORAL DAILY
Qty: 30 TABLET | Refills: 0 | Status: SHIPPED | OUTPATIENT
Start: 2022-03-30 | End: 2022-03-31 | Stop reason: SDUPTHER

## 2022-03-31 ENCOUNTER — OFFICE VISIT (OUTPATIENT)
Dept: FAMILY MEDICINE CLINIC | Facility: CLINIC | Age: 47
End: 2022-03-31

## 2022-03-31 VITALS
DIASTOLIC BLOOD PRESSURE: 84 MMHG | OXYGEN SATURATION: 97 % | WEIGHT: 291 LBS | SYSTOLIC BLOOD PRESSURE: 148 MMHG | BODY MASS INDEX: 39.42 KG/M2 | HEIGHT: 72 IN | TEMPERATURE: 97.2 F | HEART RATE: 108 BPM

## 2022-03-31 DIAGNOSIS — E55.9 VITAMIN D DEFICIENCY: ICD-10-CM

## 2022-03-31 DIAGNOSIS — F41.8 DEPRESSION WITH ANXIETY: ICD-10-CM

## 2022-03-31 DIAGNOSIS — E78.2 MIXED HYPERLIPIDEMIA: ICD-10-CM

## 2022-03-31 DIAGNOSIS — I10 BENIGN ESSENTIAL HTN: ICD-10-CM

## 2022-03-31 DIAGNOSIS — E66.01 MORBID EXOGENOUS OBESITY: ICD-10-CM

## 2022-03-31 DIAGNOSIS — E11.65 UNCONTROLLED TYPE 2 DIABETES MELLITUS WITH HYPERGLYCEMIA: Primary | ICD-10-CM

## 2022-03-31 DIAGNOSIS — I10 HTN (HYPERTENSION), BENIGN: ICD-10-CM

## 2022-03-31 PROCEDURE — 99213 OFFICE O/P EST LOW 20 MIN: CPT | Performed by: FAMILY MEDICINE

## 2022-03-31 RX ORDER — GLIMEPIRIDE 4 MG/1
4 TABLET ORAL
Qty: 30 TABLET | Refills: 1 | Status: SHIPPED | OUTPATIENT
Start: 2022-03-31 | End: 2022-05-18

## 2022-03-31 RX ORDER — LISINOPRIL 20 MG/1
20 TABLET ORAL DAILY
Qty: 90 TABLET | Refills: 1 | Status: SHIPPED | OUTPATIENT
Start: 2022-03-31 | End: 2022-07-12 | Stop reason: SDUPTHER

## 2022-03-31 RX ORDER — CARVEDILOL 25 MG/1
25 TABLET ORAL 2 TIMES DAILY
Qty: 180 TABLET | Refills: 1 | Status: SHIPPED | OUTPATIENT
Start: 2022-03-31 | End: 2022-04-15 | Stop reason: HOSPADM

## 2022-03-31 RX ORDER — METFORMIN HYDROCHLORIDE 500 MG/1
500 TABLET, EXTENDED RELEASE ORAL
Qty: 30 TABLET | Refills: 1 | Status: SHIPPED | OUTPATIENT
Start: 2022-03-31 | End: 2022-04-08 | Stop reason: HOSPADM

## 2022-03-31 RX ORDER — CITALOPRAM 40 MG/1
40 TABLET ORAL DAILY
Qty: 90 TABLET | Refills: 1 | Status: SHIPPED | OUTPATIENT
Start: 2022-03-31 | End: 2022-07-12 | Stop reason: SDUPTHER

## 2022-03-31 RX ORDER — ATORVASTATIN CALCIUM 10 MG/1
10 TABLET, FILM COATED ORAL DAILY
Qty: 90 TABLET | Refills: 1 | Status: SHIPPED | OUTPATIENT
Start: 2022-03-31 | End: 2022-04-08 | Stop reason: HOSPADM

## 2022-04-01 NOTE — PROGRESS NOTES
Subjective   Santiago Zamora is a 47 y.o. male with   Chief Complaint   Patient presents with   • Diabetes   • Hyperlipidemia     Lab and med follow up   • Med Refill   .    History of Present Illness   47-year-old white male with multiple medical problems here for further medical management.  Patient with known history of type II non-insulin-dependent diabetes mellitus as well as hypertension and hyperlipidemia.  Fasting labs have been acquired prior to this visit.  Most medications that are listed have not been used secondary to patient unable to afford them or have not filled them as of recently.  He has a new job starting in the next few weeks and will have new insurance.  He is hoping that this insurance will not have as high of a deductible he has also been out of town frequently with his last job and unable to attend appointments.  He is attempting to get his health back in order.  Metformin in the past caused headaches-patient is willing to retry this product to see if he can use this at this point.  The following portions of the patient's history were reviewed and updated as appropriate: allergies, current medications, past family history, past medical history, past social history, past surgical history and problem list.    Review of Systems   Cardiovascular:        Hypertension, hyperlipidemia   Endocrine:        Morbid exogenous obesity, type II non-insulin-dependent diabetes mellitus, vitamin D deficiency   Genitourinary:        Erectile dysfunction       Objective     Vitals:    03/31/22 1604   BP: 148/84   Pulse: 108   Temp: 97.2 °F (36.2 °C)   SpO2: 97%       No results found for this or any previous visit (from the past 672 hour(s)).    Physical Exam  Vitals and nursing note reviewed.   Constitutional:       Appearance: Normal appearance. He is well-developed and well-groomed. He is morbidly obese.      Comments: Morbid exogenous obesity with a BMI of 39.5   HENT:      Head: Normocephalic and  atraumatic.   Neck:      Thyroid: No thyroid mass or thyromegaly.      Vascular: Normal carotid pulses. No carotid bruit.      Trachea: Trachea and phonation normal.   Cardiovascular:      Rate and Rhythm: Normal rate and regular rhythm.      Heart sounds: Normal heart sounds. No murmur heard.    No friction rub. No gallop.   Pulmonary:      Effort: Pulmonary effort is normal. No respiratory distress.      Breath sounds: Normal breath sounds. No decreased breath sounds, wheezing, rhonchi or rales.   Musculoskeletal:      Cervical back: Neck supple.   Lymphadenopathy:      Cervical: No cervical adenopathy.   Skin:     General: Skin is warm and dry.      Findings: No rash.   Neurological:      Mental Status: He is alert and oriented to person, place, and time.   Psychiatric:         Attention and Perception: Attention and perception normal.         Mood and Affect: Mood and affect normal.         Speech: Speech normal.         Behavior: Behavior normal. Behavior is cooperative.         Thought Content: Thought content normal.         Cognition and Memory: Cognition and memory normal.         Judgment: Judgment normal.     See labs dated 2/2/2022    Assessment/Plan   Diagnoses and all orders for this visit:    1. Uncontrolled type 2 diabetes mellitus with hyperglycemia (HCC) (Primary)  -     metFORMIN ER (Glucophage XR) 500 MG 24 hr tablet; Take 1 tablet by mouth Daily With Breakfast.  Dispense: 30 tablet; Refill: 1  -     glimepiride (Amaryl) 4 MG tablet; Take 1 tablet by mouth Every Morning Before Breakfast.  Dispense: 30 tablet; Refill: 1  -     empagliflozin (Jardiance) 25 MG tablet tablet; Take 1 tablet by mouth Daily.  Dispense: 56 tablet; Refill: 0    2. Morbid exogenous obesity (HCC)    3. Vitamin D deficiency    4. Benign essential HTN  -     lisinopril (PRINIVIL,ZESTRIL) 20 MG tablet; Take 1 tablet by mouth Daily.  Dispense: 90 tablet; Refill: 1    5. Mixed hyperlipidemia  -     atorvastatin (LIPITOR) 10 MG  tablet; Take 1 tablet by mouth Daily.  Dispense: 90 tablet; Refill: 1    6. Depression with anxiety  -     citalopram (CeleXA) 40 MG tablet; Take 1 tablet by mouth Daily.  Dispense: 90 tablet; Refill: 1    7. HTN (hypertension), benign  -     carvedilol (COREG) 25 MG tablet; Take 1 tablet by mouth 2 (Two) Times a Day.  Dispense: 180 tablet; Refill: 1    Have asked patient on a fasting basis to acquire 2 glucometer readings per week and follow-up with me with these numbers in 4 weeks.    Return in about 4 weeks (around 4/28/2022) for Recheck.

## 2022-04-02 DIAGNOSIS — N52.9 ERECTILE DYSFUNCTION, UNSPECIFIED ERECTILE DYSFUNCTION TYPE: ICD-10-CM

## 2022-04-04 RX ORDER — TADALAFIL 5 MG/1
TABLET ORAL
Qty: 30 TABLET | Refills: 0 | Status: SHIPPED | OUTPATIENT
Start: 2022-04-04 | End: 2022-04-08 | Stop reason: HOSPADM

## 2022-04-05 ENCOUNTER — TELEPHONE (OUTPATIENT)
Dept: INTERNAL MEDICINE | Facility: CLINIC | Age: 47
End: 2022-04-05

## 2022-04-05 NOTE — TELEPHONE ENCOUNTER
"Patient's wife called due to patient complaining of \"heart racing\" with exertion since last night. Patient started on 2 new medications by PCP, wife not sure of the names of both, one if Jardiance.     Denies any SOA, palpitation, HA or chest discomfort. Discussed about monitoring HR and BP at home and when to go to the ER. Wife verbalized understanding and will call in the AM to schedule an in person visit.   "

## 2022-04-07 ENCOUNTER — APPOINTMENT (OUTPATIENT)
Dept: GENERAL RADIOLOGY | Facility: HOSPITAL | Age: 47
End: 2022-04-07

## 2022-04-07 ENCOUNTER — HOSPITAL ENCOUNTER (OUTPATIENT)
Facility: HOSPITAL | Age: 47
Setting detail: OBSERVATION
Discharge: HOME | End: 2022-04-08
Attending: EMERGENCY MEDICINE | Admitting: HOSPITALIST

## 2022-04-07 DIAGNOSIS — I48.91 ATRIAL FIBRILLATION WITH RVR: Primary | ICD-10-CM

## 2022-04-07 DIAGNOSIS — E11.65 TYPE 2 DIABETES MELLITUS WITH HYPERGLYCEMIA, WITHOUT LONG-TERM CURRENT USE OF INSULIN: ICD-10-CM

## 2022-04-07 DIAGNOSIS — G47.33 OSA (OBSTRUCTIVE SLEEP APNEA): ICD-10-CM

## 2022-04-07 DIAGNOSIS — E11.65 UNCONTROLLED TYPE 2 DIABETES MELLITUS WITH HYPERGLYCEMIA: ICD-10-CM

## 2022-04-07 PROBLEM — E66.9 OBESITY (BMI 30-39.9): Status: ACTIVE | Noted: 2022-04-07

## 2022-04-07 LAB
ALBUMIN SERPL-MCNC: 4.6 G/DL (ref 3.5–5.2)
ALBUMIN/GLOB SERPL: 1.8 G/DL
ALP SERPL-CCNC: 77 U/L (ref 39–117)
ALT SERPL W P-5'-P-CCNC: 17 U/L (ref 1–41)
ANION GAP SERPL CALCULATED.3IONS-SCNC: 12.7 MMOL/L (ref 5–15)
APTT PPP: 27.7 SECONDS (ref 24.3–38.1)
AST SERPL-CCNC: 13 U/L (ref 1–40)
BASOPHILS # BLD AUTO: 0.07 10*3/MM3 (ref 0–0.2)
BASOPHILS NFR BLD AUTO: 0.8 % (ref 0–1.5)
BILIRUB SERPL-MCNC: 0.3 MG/DL (ref 0–1.2)
BUN SERPL-MCNC: 17 MG/DL (ref 6–20)
BUN/CREAT SERPL: 14.5 (ref 7–25)
CALCIUM SPEC-SCNC: 9.8 MG/DL (ref 8.6–10.5)
CHLORIDE SERPL-SCNC: 95 MMOL/L (ref 98–107)
CO2 SERPL-SCNC: 26.3 MMOL/L (ref 22–29)
CREAT SERPL-MCNC: 1.17 MG/DL (ref 0.76–1.27)
DEPRECATED RDW RBC AUTO: 40.7 FL (ref 37–54)
EGFRCR SERPLBLD CKD-EPI 2021: 78.3 ML/MIN/1.73
EOSINOPHIL # BLD AUTO: 0.31 10*3/MM3 (ref 0–0.4)
EOSINOPHIL NFR BLD AUTO: 3.6 % (ref 0.3–6.2)
ERYTHROCYTE [DISTWIDTH] IN BLOOD BY AUTOMATED COUNT: 14.6 % (ref 12.3–15.4)
GLOBULIN UR ELPH-MCNC: 2.6 GM/DL
GLUCOSE SERPL-MCNC: 273 MG/DL (ref 65–99)
HBA1C MFR BLD: 11.8 % (ref 4.8–5.6)
HCT VFR BLD AUTO: 51.6 % (ref 37.5–51)
HGB BLD-MCNC: 16.8 G/DL (ref 13–17.7)
IMM GRANULOCYTES # BLD AUTO: 0.04 10*3/MM3 (ref 0–0.05)
IMM GRANULOCYTES NFR BLD AUTO: 0.5 % (ref 0–0.5)
INR PPP: 0.9 (ref 0.9–1.1)
LYMPHOCYTES # BLD AUTO: 1.81 10*3/MM3 (ref 0.7–3.1)
LYMPHOCYTES NFR BLD AUTO: 21 % (ref 19.6–45.3)
MAGNESIUM SERPL-MCNC: 2.3 MG/DL (ref 1.6–2.6)
MCH RBC QN AUTO: 26.5 PG (ref 26.6–33)
MCHC RBC AUTO-ENTMCNC: 32.6 G/DL (ref 31.5–35.7)
MCV RBC AUTO: 81.3 FL (ref 79–97)
MONOCYTES # BLD AUTO: 0.55 10*3/MM3 (ref 0.1–0.9)
MONOCYTES NFR BLD AUTO: 6.4 % (ref 5–12)
NEUTROPHILS NFR BLD AUTO: 5.85 10*3/MM3 (ref 1.7–7)
NEUTROPHILS NFR BLD AUTO: 67.7 % (ref 42.7–76)
NRBC BLD AUTO-RTO: 0 /100 WBC (ref 0–0.2)
NT-PROBNP SERPL-MCNC: 35.5 PG/ML (ref 0–450)
PLATELET # BLD AUTO: 239 10*3/MM3 (ref 140–450)
PMV BLD AUTO: 11.2 FL (ref 6–12)
POTASSIUM SERPL-SCNC: 4.4 MMOL/L (ref 3.5–5.2)
PROT SERPL-MCNC: 7.2 G/DL (ref 6–8.5)
PROTHROMBIN TIME: 12.3 SECONDS (ref 12.1–15)
QT INTERVAL: 295 MS
RBC # BLD AUTO: 6.35 10*6/MM3 (ref 4.14–5.8)
SODIUM SERPL-SCNC: 134 MMOL/L (ref 136–145)
TROPONIN T SERPL-MCNC: <0.01 NG/ML (ref 0–0.03)
TSH SERPL DL<=0.05 MIU/L-ACNC: 2.26 UIU/ML (ref 0.27–4.2)
WBC NRBC COR # BLD: 8.63 10*3/MM3 (ref 3.4–10.8)

## 2022-04-07 PROCEDURE — 96372 THER/PROPH/DIAG INJ SC/IM: CPT

## 2022-04-07 PROCEDURE — 96366 THER/PROPH/DIAG IV INF ADDON: CPT

## 2022-04-07 PROCEDURE — G0378 HOSPITAL OBSERVATION PER HR: HCPCS

## 2022-04-07 PROCEDURE — 85610 PROTHROMBIN TIME: CPT | Performed by: EMERGENCY MEDICINE

## 2022-04-07 PROCEDURE — 93005 ELECTROCARDIOGRAM TRACING: CPT | Performed by: INTERNAL MEDICINE

## 2022-04-07 PROCEDURE — 99284 EMERGENCY DEPT VISIT MOD MDM: CPT

## 2022-04-07 PROCEDURE — 84484 ASSAY OF TROPONIN QUANT: CPT | Performed by: INTERNAL MEDICINE

## 2022-04-07 PROCEDURE — 25010000002 ENOXAPARIN PER 10 MG: Performed by: INTERNAL MEDICINE

## 2022-04-07 PROCEDURE — 93010 ELECTROCARDIOGRAM REPORT: CPT | Performed by: INTERNAL MEDICINE

## 2022-04-07 PROCEDURE — 84484 ASSAY OF TROPONIN QUANT: CPT | Performed by: EMERGENCY MEDICINE

## 2022-04-07 PROCEDURE — 83880 ASSAY OF NATRIURETIC PEPTIDE: CPT | Performed by: EMERGENCY MEDICINE

## 2022-04-07 PROCEDURE — 96376 TX/PRO/DX INJ SAME DRUG ADON: CPT

## 2022-04-07 PROCEDURE — 71045 X-RAY EXAM CHEST 1 VIEW: CPT

## 2022-04-07 PROCEDURE — 99220 PR INITIAL OBSERVATION CARE/DAY 70 MINUTES: CPT | Performed by: INTERNAL MEDICINE

## 2022-04-07 PROCEDURE — 96365 THER/PROPH/DIAG IV INF INIT: CPT

## 2022-04-07 PROCEDURE — 96375 TX/PRO/DX INJ NEW DRUG ADDON: CPT

## 2022-04-07 PROCEDURE — 93005 ELECTROCARDIOGRAM TRACING: CPT | Performed by: EMERGENCY MEDICINE

## 2022-04-07 PROCEDURE — 83036 HEMOGLOBIN GLYCOSYLATED A1C: CPT | Performed by: INTERNAL MEDICINE

## 2022-04-07 PROCEDURE — 83735 ASSAY OF MAGNESIUM: CPT | Performed by: INTERNAL MEDICINE

## 2022-04-07 PROCEDURE — 80050 GENERAL HEALTH PANEL: CPT | Performed by: EMERGENCY MEDICINE

## 2022-04-07 PROCEDURE — 99284 EMERGENCY DEPT VISIT MOD MDM: CPT | Performed by: EMERGENCY MEDICINE

## 2022-04-07 PROCEDURE — 85730 THROMBOPLASTIN TIME PARTIAL: CPT | Performed by: EMERGENCY MEDICINE

## 2022-04-07 RX ORDER — SODIUM CHLORIDE 0.9 % (FLUSH) 0.9 %
10 SYRINGE (ML) INJECTION AS NEEDED
Status: DISCONTINUED | OUTPATIENT
Start: 2022-04-07 | End: 2022-04-08 | Stop reason: HOSPADM

## 2022-04-07 RX ORDER — ONDANSETRON 2 MG/ML
4 INJECTION INTRAMUSCULAR; INTRAVENOUS EVERY 6 HOURS PRN
Status: DISCONTINUED | OUTPATIENT
Start: 2022-04-07 | End: 2022-04-08 | Stop reason: HOSPADM

## 2022-04-07 RX ORDER — GLIPIZIDE 10 MG/1
10 TABLET ORAL
Refills: 1 | Status: DISCONTINUED | OUTPATIENT
Start: 2022-04-08 | End: 2022-04-08 | Stop reason: HOSPADM

## 2022-04-07 RX ORDER — ACETAMINOPHEN 325 MG/1
650 TABLET ORAL EVERY 4 HOURS PRN
Status: DISCONTINUED | OUTPATIENT
Start: 2022-04-07 | End: 2022-04-08 | Stop reason: HOSPADM

## 2022-04-07 RX ORDER — DEXTROSE MONOHYDRATE 25 G/50ML
25 INJECTION, SOLUTION INTRAVENOUS
Status: DISCONTINUED | OUTPATIENT
Start: 2022-04-07 | End: 2022-04-08 | Stop reason: HOSPADM

## 2022-04-07 RX ORDER — ONDANSETRON 2 MG/ML
4 INJECTION INTRAMUSCULAR; INTRAVENOUS EVERY 6 HOURS PRN
Status: DISCONTINUED | OUTPATIENT
Start: 2022-04-07 | End: 2022-04-07 | Stop reason: SDUPTHER

## 2022-04-07 RX ORDER — SODIUM CHLORIDE 9 MG/ML
100 INJECTION, SOLUTION INTRAVENOUS CONTINUOUS
Status: DISCONTINUED | OUTPATIENT
Start: 2022-04-07 | End: 2022-04-08 | Stop reason: HOSPADM

## 2022-04-07 RX ORDER — ATORVASTATIN CALCIUM 10 MG/1
10 TABLET, FILM COATED ORAL DAILY
Status: DISCONTINUED | OUTPATIENT
Start: 2022-04-08 | End: 2022-04-08

## 2022-04-07 RX ORDER — SODIUM CHLORIDE 9 MG/ML
40 INJECTION, SOLUTION INTRAVENOUS AS NEEDED
Status: DISCONTINUED | OUTPATIENT
Start: 2022-04-07 | End: 2022-04-08 | Stop reason: HOSPADM

## 2022-04-07 RX ORDER — ACETAMINOPHEN 160 MG/5ML
650 SOLUTION ORAL EVERY 4 HOURS PRN
Status: DISCONTINUED | OUTPATIENT
Start: 2022-04-07 | End: 2022-04-08 | Stop reason: HOSPADM

## 2022-04-07 RX ORDER — CARVEDILOL 12.5 MG/1
25 TABLET ORAL 2 TIMES DAILY
Status: DISCONTINUED | OUTPATIENT
Start: 2022-04-07 | End: 2022-04-08 | Stop reason: HOSPADM

## 2022-04-07 RX ORDER — ASPIRIN 81 MG/1
81 TABLET, CHEWABLE ORAL ONCE
Status: COMPLETED | OUTPATIENT
Start: 2022-04-07 | End: 2022-04-07

## 2022-04-07 RX ORDER — ACETAMINOPHEN 650 MG/1
650 SUPPOSITORY RECTAL EVERY 4 HOURS PRN
Status: DISCONTINUED | OUTPATIENT
Start: 2022-04-07 | End: 2022-04-08 | Stop reason: HOSPADM

## 2022-04-07 RX ORDER — DILTIAZEM HYDROCHLORIDE 5 MG/ML
5 INJECTION INTRAVENOUS ONCE
Status: COMPLETED | OUTPATIENT
Start: 2022-04-07 | End: 2022-04-07

## 2022-04-07 RX ORDER — NICOTINE POLACRILEX 4 MG
15 LOZENGE BUCCAL
Status: DISCONTINUED | OUTPATIENT
Start: 2022-04-07 | End: 2022-04-08 | Stop reason: HOSPADM

## 2022-04-07 RX ORDER — ONDANSETRON 4 MG/1
4 TABLET, FILM COATED ORAL EVERY 6 HOURS PRN
Status: DISCONTINUED | OUTPATIENT
Start: 2022-04-07 | End: 2022-04-07 | Stop reason: SDUPTHER

## 2022-04-07 RX ORDER — CITALOPRAM 20 MG/1
40 TABLET ORAL DAILY
Status: DISCONTINUED | OUTPATIENT
Start: 2022-04-08 | End: 2022-04-08 | Stop reason: HOSPADM

## 2022-04-07 RX ORDER — NITROGLYCERIN 0.4 MG/1
0.4 TABLET SUBLINGUAL
Status: DISCONTINUED | OUTPATIENT
Start: 2022-04-07 | End: 2022-04-08 | Stop reason: HOSPADM

## 2022-04-07 RX ORDER — ONDANSETRON 4 MG/1
4 TABLET, FILM COATED ORAL EVERY 6 HOURS PRN
Status: DISCONTINUED | OUTPATIENT
Start: 2022-04-07 | End: 2022-04-08 | Stop reason: HOSPADM

## 2022-04-07 RX ORDER — SODIUM CHLORIDE 0.9 % (FLUSH) 0.9 %
10 SYRINGE (ML) INJECTION EVERY 12 HOURS SCHEDULED
Status: DISCONTINUED | OUTPATIENT
Start: 2022-04-07 | End: 2022-04-08 | Stop reason: HOSPADM

## 2022-04-07 RX ORDER — DILTIAZEM HYDROCHLORIDE 180 MG/1
180 CAPSULE, COATED, EXTENDED RELEASE ORAL NIGHTLY
Status: DISCONTINUED | OUTPATIENT
Start: 2022-04-07 | End: 2022-04-08 | Stop reason: HOSPADM

## 2022-04-07 RX ORDER — LISINOPRIL 10 MG/1
20 TABLET ORAL DAILY
Status: DISCONTINUED | OUTPATIENT
Start: 2022-04-08 | End: 2022-04-08 | Stop reason: HOSPADM

## 2022-04-07 RX ORDER — CHOLECALCIFEROL (VITAMIN D3) 125 MCG
5 CAPSULE ORAL NIGHTLY PRN
Status: DISCONTINUED | OUTPATIENT
Start: 2022-04-07 | End: 2022-04-08 | Stop reason: HOSPADM

## 2022-04-07 RX ORDER — CYCLOBENZAPRINE HCL 10 MG
10 TABLET ORAL 3 TIMES DAILY PRN
Status: DISCONTINUED | OUTPATIENT
Start: 2022-04-07 | End: 2022-04-08 | Stop reason: HOSPADM

## 2022-04-07 RX ADMIN — CARVEDILOL 25 MG: 12.5 TABLET, FILM COATED ORAL at 22:07

## 2022-04-07 RX ADMIN — DILTIAZEM HYDROCHLORIDE 180 MG: 180 CAPSULE, COATED, EXTENDED RELEASE ORAL at 23:35

## 2022-04-07 RX ADMIN — ENOXAPARIN SODIUM 130 MG: 150 INJECTION SUBCUTANEOUS at 22:07

## 2022-04-07 RX ADMIN — SODIUM CHLORIDE 100 ML/HR: 9 INJECTION, SOLUTION INTRAVENOUS at 22:07

## 2022-04-07 RX ADMIN — SODIUM CHLORIDE 5 MG/HR: 900 INJECTION, SOLUTION INTRAVENOUS at 18:16

## 2022-04-07 RX ADMIN — Medication 10 ML: at 21:32

## 2022-04-07 RX ADMIN — MELATONIN TAB 5 MG 5 MG: 5 TAB at 23:35

## 2022-04-07 RX ADMIN — ASPIRIN 81 MG CHEWABLE TABLET 81 MG: 81 TABLET CHEWABLE at 22:07

## 2022-04-07 RX ADMIN — DILTIAZEM HYDROCHLORIDE 5 MG: 5 INJECTION INTRAVENOUS at 18:17

## 2022-04-07 NOTE — ED PROVIDER NOTES
"Subjective   History of Present Illness  History of Present Illness    Chief complaint: Palpitations, chest pain    Location: Central chest    Quality/Severity: Fast, pounding    Timing/Duration: Intermittent for several days, worse today    Modifying Factors: None    Narrative: This patient presents for evaluation of palpitations and chest discomfort that has been going on for the past 2 days.  It seems to be worsening this afternoon.  It is causing him some shortness of breath and weakness and lightheadedness feeling.  The pain does not radiate to the arms or to the back.  He says his heartbeat feels like it is fast and pounding and occasionally \"fluttering\" in the center of his chest.    Associated Symptoms: Short of breath    Review of Systems   Constitutional: Negative for activity change, diaphoresis and fever.   HENT: Negative.    Eyes: Negative for pain and visual disturbance.   Respiratory: Positive for shortness of breath. Negative for cough.    Cardiovascular: Positive for chest pain and palpitations.   Gastrointestinal: Negative for abdominal pain and nausea.   Genitourinary: Negative for dysuria.   Skin: Negative for color change and wound.   Neurological: Negative for syncope and headaches.   All other systems reviewed and are negative.      Past Medical History:   Diagnosis Date   • Anxiety    • Cardiomyopathy (HCC)    • Depression    • Diabetes mellitus (HCC)    • Varicose veins        Allergies   Allergen Reactions   • Eggs Or Egg-Derived Products Swelling   • Fish-Derived Products Swelling       Past Surgical History:   Procedure Laterality Date   • CHOLECYSTECTOMY         Family History   Problem Relation Age of Onset   • Hypertension Mother    • Atrial fibrillation Father    • Heart attack Father    • Diabetes Other        Social History     Socioeconomic History   • Marital status:    Tobacco Use   • Smoking status: Never Smoker   • Smokeless tobacco: Never Used   Substance and Sexual " Activity   • Alcohol use: No   • Drug use: No   • Sexual activity: Defer       ED Triage Vitals [04/07/22 1753]   Temp Heart Rate Resp BP SpO2   97.5 °F (36.4 °C) 102 18 177/100 98 %      Temp src Heart Rate Source Patient Position BP Location FiO2 (%)   Oral Monitor -- -- --         Objective   Physical Exam  Vitals and nursing note reviewed.   Constitutional:       Appearance: He is well-developed. He is obese. He is not toxic-appearing or diaphoretic.   HENT:      Head: Normocephalic and atraumatic.   Eyes:      General:         Right eye: No discharge.         Left eye: No discharge.      Pupils: Pupils are equal, round, and reactive to light.   Cardiovascular:      Rate and Rhythm: Tachycardia present. Rhythm irregular.      Heart sounds: Normal heart sounds. No murmur heard.  Pulmonary:      Effort: Pulmonary effort is normal. No tachypnea, accessory muscle usage or respiratory distress.      Breath sounds: Decreased breath sounds present. No wheezing, rhonchi or rales.      Comments: Diminished breath sounds in the bases  Chest:      Chest wall: No mass or tenderness.   Abdominal:      Palpations: Abdomen is soft. There is no hepatomegaly or mass.      Tenderness: There is no abdominal tenderness. There is no guarding or rebound.   Musculoskeletal:         General: No deformity. Normal range of motion.      Cervical back: Normal range of motion and neck supple.      Right lower leg: Edema present.      Left lower leg: Edema present.   Skin:     General: Skin is warm and dry.      Findings: No erythema or rash.   Neurological:      General: No focal deficit present.      Mental Status: He is alert and oriented to person, place, and time.   Psychiatric:         Behavior: Behavior normal.         Thought Content: Thought content normal.         Judgment: Judgment normal.       EKG           EKG time/Interp time: 1752/1755  Rhythm/Rate: Atrial fibrillation with RVR, 165 bpm  P waves and [place]: None  QRS, axis: 107  ms, left axis deviation  ST and T waves: Rate related repolarization abnormality noted    Independently interpreted by me contemporaneously with treatment          RADIOLOGY        Study: Chest x-ray    Findings: Cardiomegaly.  Lungs clear.  No active disease    Interpreted contemporaneously with treatment by Dr. Frankel, independently viewed by me    Procedures           ED Course  ED Course as of 04/07/22 1853   Thu Apr 07, 2022   1851 Patient presents with A. fib, RVR.  Troponin is negative.  Saturations are normal on room air.  Work of breathing is normal.  Heart rate is gradually improving now on diltiazem drip.  Patient is feeling a little bit better at this time.  Making arrangements to admit him to the unit for continued rate and rhythm management. [CM]      ED Course User Index  [CM] Fuad Walton MD                                                 MDM  Number of Diagnoses or Management Options     Amount and/or Complexity of Data Reviewed  Clinical lab tests: reviewed and ordered  Tests in the radiology section of CPT®: ordered and reviewed  Decide to obtain previous medical records or to obtain history from someone other than the patient: yes  Review and summarize past medical records: yes  Discuss the patient with other providers: yes (Dr. Perkins)  Independent visualization of images, tracings, or specimens: yes    Risk of Complications, Morbidity, and/or Mortality  Presenting problems: moderate  Diagnostic procedures: moderate  Management options: moderate        Final diagnoses:   Atrial fibrillation with RVR (HCC)       ED Disposition  ED Disposition     ED Disposition   Decision to Admit    Condition   --    Comment   Level of Care: Critical Care [6]   Diagnosis: Atrial fibrillation with RVR (HCC) [882665]   Admitting Physician: EITAN PERKINS [[id number]]   Attending Physician: EITAN PERKINS [[id number]]               No follow-up provider specified.       Medication List      No changes were made to your  prescriptions during this visit.          Fuad Walton MD  04/07/22 1166

## 2022-04-08 ENCOUNTER — APPOINTMENT (OUTPATIENT)
Dept: CARDIOLOGY | Facility: HOSPITAL | Age: 47
End: 2022-04-08

## 2022-04-08 ENCOUNTER — READMISSION MANAGEMENT (OUTPATIENT)
Dept: CALL CENTER | Facility: HOSPITAL | Age: 47
End: 2022-04-08

## 2022-04-08 VITALS
DIASTOLIC BLOOD PRESSURE: 69 MMHG | HEART RATE: 70 BPM | HEIGHT: 72 IN | SYSTOLIC BLOOD PRESSURE: 105 MMHG | TEMPERATURE: 97.8 F | BODY MASS INDEX: 38.6 KG/M2 | WEIGHT: 285 LBS | OXYGEN SATURATION: 97 % | RESPIRATION RATE: 18 BRPM

## 2022-04-08 LAB
ALBUMIN SERPL-MCNC: 4 G/DL (ref 3.5–5.2)
ALBUMIN/GLOB SERPL: 1.4 G/DL
ALP SERPL-CCNC: 64 U/L (ref 39–117)
ALT SERPL W P-5'-P-CCNC: 15 U/L (ref 1–41)
ANION GAP SERPL CALCULATED.3IONS-SCNC: 8.4 MMOL/L (ref 5–15)
AORTIC DIMENSIONLESS INDEX: 0.6 (DI)
AST SERPL-CCNC: 12 U/L (ref 1–40)
BH CV ECHO MEAS - ACS: 2.07 CM
BH CV ECHO MEAS - AO MAX PG: 5.8 MMHG
BH CV ECHO MEAS - AO MEAN PG: 2.8 MMHG
BH CV ECHO MEAS - AO V2 MAX: 120.2 CM/SEC
BH CV ECHO MEAS - AO V2 VTI: 24.4 CM
BH CV ECHO MEAS - AVA(I,D): 3.1 CM2
BH CV ECHO MEAS - CONTRAST EF 4CH: 58 CM2
BH CV ECHO MEAS - EDV(CUBED): 156 ML
BH CV ECHO MEAS - EDV(MOD-SP2): 154 ML
BH CV ECHO MEAS - EDV(MOD-SP4): 176 ML
BH CV ECHO MEAS - EF(MOD-BP): 57.8 %
BH CV ECHO MEAS - EF(MOD-SP2): 59.3 %
BH CV ECHO MEAS - EF(MOD-SP4): 60.2 %
BH CV ECHO MEAS - ESV(CUBED): 72.6 ML
BH CV ECHO MEAS - ESV(MOD-SP2): 62.7 ML
BH CV ECHO MEAS - ESV(MOD-SP4): 70.1 ML
BH CV ECHO MEAS - FS: 22.5 %
BH CV ECHO MEAS - IVS/LVPW: 0.93 CM
BH CV ECHO MEAS - IVSD: 1.18 CM
BH CV ECHO MEAS - LAT PEAK E' VEL: 9.5 CM/SEC
BH CV ECHO MEAS - LV DIASTOLIC VOL/BSA (35-75): 74.1 CM2
BH CV ECHO MEAS - LV MASS(C)D: 271.5 GRAMS
BH CV ECHO MEAS - LV MAX PG: 1.88 MMHG
BH CV ECHO MEAS - LV MEAN PG: 1.03 MMHG
BH CV ECHO MEAS - LV SYSTOLIC VOL/BSA (12-30): 29.5 CM2
BH CV ECHO MEAS - LV V1 MAX: 68.6 CM/SEC
BH CV ECHO MEAS - LV V1 VTI: 14 CM
BH CV ECHO MEAS - LVIDD: 5.4 CM
BH CV ECHO MEAS - LVIDS: 4.2 CM
BH CV ECHO MEAS - LVOT AREA: 5.3 CM2
BH CV ECHO MEAS - LVOT DIAM: 2.6 CM
BH CV ECHO MEAS - LVPWD: 1.28 CM
BH CV ECHO MEAS - MED PEAK E' VEL: 7.5 CM/SEC
BH CV ECHO MEAS - MV A DUR: 0.12 SEC
BH CV ECHO MEAS - MV A MAX VEL: 76.7 CM/SEC
BH CV ECHO MEAS - MV DEC SLOPE: 559.5 CM/SEC2
BH CV ECHO MEAS - MV DEC TIME: 0.17 MSEC
BH CV ECHO MEAS - MV E MAX VEL: 78.8 CM/SEC
BH CV ECHO MEAS - MV E/A: 1.03
BH CV ECHO MEAS - MV MEAN PG: 1.87 MMHG
BH CV ECHO MEAS - MV V2 VTI: 25.6 CM
BH CV ECHO MEAS - MVA(VTI): 2.9 CM2
BH CV ECHO MEAS - PA ACC TIME: 0.06 SEC
BH CV ECHO MEAS - PA PR(ACCEL): 52.9 MMHG
BH CV ECHO MEAS - PA V2 MAX: 88.2 CM/SEC
BH CV ECHO MEAS - PULM A REVS DUR: 0.1 SEC
BH CV ECHO MEAS - PULM A REVS VEL: 26.5 CM/SEC
BH CV ECHO MEAS - PULM DIAS VEL: 30.3 CM/SEC
BH CV ECHO MEAS - PULM SYS VEL: 31.9 CM/SEC
BH CV ECHO MEAS - RAP SYSTOLE: 3 MMHG
BH CV ECHO MEAS - RV V1 VTI: 10.9 CM
BH CV ECHO MEAS - RVSP: 16.2 MMHG
BH CV ECHO MEAS - SI(MOD-SP2): 38.5 ML/M2
BH CV ECHO MEAS - SI(MOD-SP4): 44.6 ML/M2
BH CV ECHO MEAS - SV(LVOT): 74.8 ML
BH CV ECHO MEAS - SV(MOD-SP2): 91.3 ML
BH CV ECHO MEAS - SV(MOD-SP4): 105.9 ML
BH CV ECHO MEAS - TAPSE (>1.6): 1.45 CM
BH CV ECHO MEAS - TR MAX PG: 13.2 MMHG
BH CV ECHO MEAS - TR MAX VEL: 181.9 CM/SEC
BH CV ECHO MEASUREMENTS AVERAGE E/E' RATIO: 9.27
BH CV LEA LEFT PERONEAL  DISTAL EDV: 9.2 CM/S
BH CV LEA LEFT PERONEAL  DISTAL PSV: 9.2 CM/S
BH CV LEA LEFT PERONEAL  MID EDV: 9.2 CM/S
BH CV LEA LEFT PERONEAL  MID PSV: 9.2 CM/S
BH CV LEA LEFT PERONEAL  PROX EDV: 9.2 CM/S
BH CV LEA LEFT PERONEAL  PROX PSV: 9.2 CM/S
BH CV LEA LEFT PTA DISTAL EDV: 9.2 CM/S
BH CV LEA LEFT PTA DISTAL PSV: 9.2 CM/S
BH CV LEA LEFT PTA MID EDV: 9.2 CM/S
BH CV LEA LEFT PTA MID PSV: 9.2 CM/S
BH CV LEA LEFT PTA PROX EDV: 9.2 CM/S
BH CV LEA LEFT PTA PROX PSV: 9.2 CM/S
BH CV XLRA - RV BASE: 3.7 CM
BH CV XLRA - RV LENGTH: 9.2 CM
BH CV XLRA - RV MID: 2.6 CM
BH CV XLRA - TDI S': 7.9 CM/SEC
BILIRUB SERPL-MCNC: 0.4 MG/DL (ref 0–1.2)
BUN SERPL-MCNC: 16 MG/DL (ref 6–20)
BUN/CREAT SERPL: 18.2 (ref 7–25)
CALCIUM SPEC-SCNC: 9.1 MG/DL (ref 8.6–10.5)
CHLORIDE SERPL-SCNC: 99 MMOL/L (ref 98–107)
CO2 SERPL-SCNC: 26.6 MMOL/L (ref 22–29)
CREAT SERPL-MCNC: 0.88 MG/DL (ref 0.76–1.27)
DEPRECATED RDW RBC AUTO: 41.7 FL (ref 37–54)
EGFRCR SERPLBLD CKD-EPI 2021: 108.1 ML/MIN/1.73
ERYTHROCYTE [DISTWIDTH] IN BLOOD BY AUTOMATED COUNT: 14.3 % (ref 12.3–15.4)
GLOBULIN UR ELPH-MCNC: 2.8 GM/DL
GLUCOSE SERPL-MCNC: 214 MG/DL (ref 65–99)
HCT VFR BLD AUTO: 48.7 % (ref 37.5–51)
HGB BLD-MCNC: 15.7 G/DL (ref 13–17.7)
LEFT ATRIUM VOLUME INDEX: 27.3 ML/M2
MAGNESIUM SERPL-MCNC: 2.1 MG/DL (ref 1.6–2.6)
MAXIMAL PREDICTED HEART RATE: 175 BPM
MCH RBC QN AUTO: 26.4 PG (ref 26.6–33)
MCHC RBC AUTO-ENTMCNC: 32.2 G/DL (ref 31.5–35.7)
MCV RBC AUTO: 81.8 FL (ref 79–97)
PLATELET # BLD AUTO: 202 10*3/MM3 (ref 140–450)
PMV BLD AUTO: 10.7 FL (ref 6–12)
POTASSIUM SERPL-SCNC: 4.2 MMOL/L (ref 3.5–5.2)
PROT SERPL-MCNC: 6.8 G/DL (ref 6–8.5)
QT INTERVAL: 383 MS
RBC # BLD AUTO: 5.95 10*6/MM3 (ref 4.14–5.8)
SINUS: 3.2 CM
SODIUM SERPL-SCNC: 134 MMOL/L (ref 136–145)
STRESS TARGET HR: 149 BPM
TROPONIN T SERPL-MCNC: <0.01 NG/ML (ref 0–0.03)
WBC NRBC COR # BLD: 6.82 10*3/MM3 (ref 3.4–10.8)

## 2022-04-08 PROCEDURE — 93306 TTE W/DOPPLER COMPLETE: CPT

## 2022-04-08 PROCEDURE — 93246 EXT ECG>7D<15D RECORDING: CPT

## 2022-04-08 PROCEDURE — 63710000001 INSULIN ASPART PER 5 UNITS: Performed by: NURSE PRACTITIONER

## 2022-04-08 PROCEDURE — G0378 HOSPITAL OBSERVATION PER HR: HCPCS

## 2022-04-08 PROCEDURE — 96361 HYDRATE IV INFUSION ADD-ON: CPT

## 2022-04-08 PROCEDURE — 25010000002 PERFLUTREN (DEFINITY) 8.476 MG IN SODIUM CHLORIDE (PF) 0.9 % 10 ML INJECTION: Performed by: INTERNAL MEDICINE

## 2022-04-08 PROCEDURE — 93306 TTE W/DOPPLER COMPLETE: CPT | Performed by: INTERNAL MEDICINE

## 2022-04-08 PROCEDURE — 85027 COMPLETE CBC AUTOMATED: CPT | Performed by: INTERNAL MEDICINE

## 2022-04-08 PROCEDURE — 99217 PR OBSERVATION CARE DISCHARGE MANAGEMENT: CPT | Performed by: NURSE PRACTITIONER

## 2022-04-08 PROCEDURE — 83735 ASSAY OF MAGNESIUM: CPT | Performed by: INTERNAL MEDICINE

## 2022-04-08 PROCEDURE — 96366 THER/PROPH/DIAG IV INF ADDON: CPT

## 2022-04-08 PROCEDURE — 99204 OFFICE O/P NEW MOD 45 MIN: CPT | Performed by: INTERNAL MEDICINE

## 2022-04-08 PROCEDURE — 80053 COMPREHEN METABOLIC PANEL: CPT | Performed by: INTERNAL MEDICINE

## 2022-04-08 PROCEDURE — 94799 UNLISTED PULMONARY SVC/PX: CPT

## 2022-04-08 PROCEDURE — 94761 N-INVAS EAR/PLS OXIMETRY MLT: CPT

## 2022-04-08 RX ORDER — ATORVASTATIN CALCIUM 40 MG/1
40 TABLET, FILM COATED ORAL DAILY
Qty: 30 TABLET | Refills: 1 | Status: SHIPPED | OUTPATIENT
Start: 2022-04-09 | End: 2022-08-12

## 2022-04-08 RX ORDER — SPIRONOLACTONE 25 MG/1
12.5 TABLET ORAL DAILY
Qty: 16 TABLET | Refills: 1 | Status: SHIPPED | OUTPATIENT
Start: 2022-04-09 | End: 2022-04-15 | Stop reason: HOSPADM

## 2022-04-08 RX ORDER — SPIRONOLACTONE 25 MG/1
12.5 TABLET ORAL DAILY
Status: DISCONTINUED | OUTPATIENT
Start: 2022-04-08 | End: 2022-04-08 | Stop reason: HOSPADM

## 2022-04-08 RX ORDER — ATORVASTATIN CALCIUM 40 MG/1
40 TABLET, FILM COATED ORAL DAILY
Status: DISCONTINUED | OUTPATIENT
Start: 2022-04-08 | End: 2022-04-08 | Stop reason: HOSPADM

## 2022-04-08 RX ORDER — DILTIAZEM HYDROCHLORIDE 180 MG/1
180 CAPSULE, COATED, EXTENDED RELEASE ORAL NIGHTLY
Qty: 30 CAPSULE | Refills: 1 | Status: SHIPPED | OUTPATIENT
Start: 2022-04-08 | End: 2022-04-15 | Stop reason: HOSPADM

## 2022-04-08 RX ADMIN — GLIPIZIDE 10 MG: 10 TABLET ORAL at 08:29

## 2022-04-08 RX ADMIN — EMPAGLIFLOZIN 25 MG: 25 TABLET, FILM COATED ORAL at 08:29

## 2022-04-08 RX ADMIN — Medication 10 ML: at 08:30

## 2022-04-08 RX ADMIN — CITALOPRAM HYDROBROMIDE 40 MG: 20 TABLET ORAL at 08:29

## 2022-04-08 RX ADMIN — SPIRONOLACTONE 12.5 MG: 25 TABLET ORAL at 08:29

## 2022-04-08 RX ADMIN — INSULIN ASPART 4 UNITS: 100 INJECTION, SOLUTION INTRAVENOUS; SUBCUTANEOUS at 08:28

## 2022-04-08 RX ADMIN — ACETAMINOPHEN 650 MG: 325 TABLET ORAL at 08:33

## 2022-04-08 RX ADMIN — LISINOPRIL 20 MG: 10 TABLET ORAL at 08:30

## 2022-04-08 RX ADMIN — CARVEDILOL 25 MG: 12.5 TABLET, FILM COATED ORAL at 08:28

## 2022-04-08 RX ADMIN — ATORVASTATIN CALCIUM 40 MG: 40 TABLET, FILM COATED ORAL at 08:29

## 2022-04-08 RX ADMIN — SODIUM CHLORIDE 2 ML: 9 INJECTION INTRAMUSCULAR; INTRAVENOUS; SUBCUTANEOUS at 08:15

## 2022-04-08 NOTE — DISCHARGE SUMMARY
"Santiago Zamora  1975  [id number]    Hospitalists Discharge Summary    Date of Admission: 4/7/2022  Date of Discharge:  4/8/2022    History of Present Illness from [facility] per Dr. Woods, cardiology:   \"This is a 47-year-old obese male with past medical history of diabetes mellitus, hypertension, hyperlipidemia, anxiety, remote history of CHF with reported prior ejection fraction of 10%, atrial fibrillation.  He has not seen cardiology in some time but reported that he been released from them because his ejection fraction improved.     He presented to the emergency department with complaints of heart fluttering associated with lightheadedness and short windedness.  On arrival, his heart rate was 102 but then did elevate to 172 with a blood pressure initially 177/100, saturation 98% on room air.  This morning his heart rate is 64 beats minute with a blood pressure of 143/93.  He has had 2 - troponins with a normal proBNP, blood glucose was 273 with normal creatinine otherwise normal CMP, hemoglobin A1c 11.8, normal TSH, normal magnesium, normal INR, unremarkable CBC.  Echo this morning is pending.  Initial EKG showed atrial fibrillation with left anterior fascicular block.  EKG last night showed sinus rhythm with an incomplete left bundle branch block.     On arrival, he was placed on diltiazem drip.  He has no chest pain or pressure.  He now does not feel his heart racing or skipping his he is back in sinus rhythm.  He had no dizziness, near syncope or falls when he was in atrial fibrillation.  He denies nausea, diarrhea.  He has no orthopnea or PND.  He has no exertional dyspnea.  The only thing that he felt was his heart racing really.  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  He works about 60 to 70 hours a week.  When he had his cardiomyopathy 15 years ago, he was drinking heavily and has not drank anything in 15 years.  He uses no tobacco or illicit drugs.  He used to " "weigh 400 pounds and has lost just over 100 pounds.  He is known to have sleep apnea but cannot tolerate CPAP mask.\"    Primary Discharge diagnoses:  Atrial fibrillation with RVR  Uncontrolled DM2 in obese with hyperglycemia-A1C 11.8%    Secondary Discharge Diagnoses:   History of cardiomyopathy  Uncontrolled hypertension  Untreated VIDHYA    Hospital Course Summary:   Patient was followed in consultation by cardiology.  [id number] score is 2 and patient converted out of A. fib back to NSR overnight, no anticoagulation for now per cardiology other than home baby aspirin.  Echocardiogram showed normal EF.  Home today with Zio patch.  Multiple medication changes were made this admission.  Patient has history of hyperkalemia and with initiation of spironolactone and continuation of lisinopril, recommend BMP in 1 week with PCP. Potassium 4.2 today. Diabetes poorly controlled, patient admits to dietary noncompliance but is aware of need to alter. Home on oral medications with f/u Quinn Stoner DO with glucose log to determine need for insulin.   Follow up cardiology 5 weeks  Follow-up PCP 1 week in 2 weeks for repeat BMP  Referral made this admission to sleep lab    PCP  Patient Care Team:  Quinn Stoner DO as PCP - General (Family Medicine)  Quinn Stoner DO    Consults:   Consults     Date and Time Order Name Status Description    4/7/2022  8:03 PM Inpatient Cardiology Consult Completed         Operations and Procedures Performed:     Adult Transthoracic Echo Complete w/ Color, Spectral and Contrast if necessary per protocol    Result Date: 4/8/2022  Narrative: · Left ventricular wall thickness is consistent with mild concentric hypertrophy. · Calculated left ventricular EF = 57.8% Estimated left ventricular EF was in agreement with the calculated left ventricular EF. Left ventricular systolic function is normal. · Estimated right ventricular systolic pressure from tricuspid regurgitation is normal (<35 mmHg). " · Left ventricular diastolic function was normal.      XR Chest 1 View    Result Date: 4/7/2022  Narrative: CHEST X-RAY, 4/7/2022  HISTORY:  47-year-old male in the ED complaining of several day history of intermittent chest pain, heart palpitations. Atrial fibrillation is noted. Diabetes, morbid obesity, hypertension. TECHNIQUE: AP portable chest x-ray. COMPARISON: *  Chest x-ray, 11/4/2012. FINDINGS: Mild cardiomegaly. Pulmonary vascularity is normal. The lungs appear clear. No visible pulmonary edema, focal infiltrate or pleural effusion. Low lung volumes.     Impression: Cardiomegaly. Lungs clear. No active disease. Signer Name: Quinn Frankel MD  Signed: 4/7/2022 6:35 PM  Workstation Name: [id number]  [facility]    Allergies:  is allergic to eggs or egg-derived products and fish-derived products.    Benigno  No medications noted per report, reviewed by me    Discharge Medications:     Discharge Medications      New Medications      Instructions Start Date   dilTIAZem  MG 24 hr capsule  Commonly known as: CARDIZEM CD   180 mg, Oral, Nightly      spironolactone 25 MG tablet  Commonly known as: ALDACTONE   Take One-Half tablet (12.5 mg) by mouth Daily.   Start Date: April 9, 2022        Changes to Medications      Instructions Start Date   atorvastatin 40 MG tablet  Commonly known as: LIPITOR  What changed:   · medication strength  · how much to take   40 mg, Oral, Daily   Start Date: April 9, 2022     empagliflozin 25 MG tablet tablet  Commonly known as: Jardiance  What changed: how much to take   25 mg, Oral, Daily         Continue These Medications      Instructions Start Date   1st Choice Lancets Super Thin misc   USE TO TEST GLUCOSE DAILY      aspirin 81 MG chewable tablet   Oral      carvedilol 25 MG tablet  Commonly known as: COREG   25 mg, Oral, 2 Times Daily      citalopram 40 MG tablet  Commonly known as: CeleXA   40 mg, Oral, Daily      cyclobenzaprine 10 MG  tablet  Commonly known as: FLEXERIL   10 mg, Oral, 3 Times Daily PRN      glimepiride 4 MG tablet  Commonly known as: Amaryl   4 mg, Oral, Every Morning Before Breakfast      glucose blood test strip  Commonly known as: Accu-Chek Rebecca   Test blood sugar once daily as instructed      glucose monitor monitoring kit   Use accu-chek Rebecca glucometer to check your blood sugar once daily      lisinopril 20 MG tablet  Commonly known as: PRINIVIL,ZESTRIL   20 mg, Oral, Daily         Stop These Medications    metFORMIN  MG 24 hr tablet  Commonly known as: Glucophage XR     tadalafil 5 MG tablet  Commonly known as: Cialis            Last Lab Results:   Lab Results (most recent)     Procedure Component Value Units Date/Time    Comprehensive Metabolic Panel [[id number]]  (Abnormal) Collected: 04/08/22 0552    Specimen: Blood Updated: 04/08/22 0613     Glucose 214 mg/dL      BUN 16 mg/dL      Creatinine 0.88 mg/dL      Sodium 134 mmol/L      Potassium 4.2 mmol/L      Chloride 99 mmol/L      CO2 26.6 mmol/L      Calcium 9.1 mg/dL      Total Protein 6.8 g/dL      Albumin 4.00 g/dL      ALT (SGPT) 15 U/L      AST (SGOT) 12 U/L      Alkaline Phosphatase 64 U/L      Total Bilirubin 0.4 mg/dL      Globulin 2.8 gm/dL      A/G Ratio 1.4 g/dL      BUN/Creatinine Ratio 18.2     Anion Gap 8.4 mmol/L      eGFR 108.1 mL/min/1.73      Comment: National Kidney Foundation and American Society of Nephrology (ASN) Task Force recommended calculation based on the Chronic Kidney Disease Epidemiology Collaboration (CKD-EPI) equation refit without adjustment for race.       Narrative:      GFR Normal >60  Chronic Kidney Disease <60  Kidney Failure <15      Magnesium [[id number]]  (Normal) Collected: 04/08/22 0552    Specimen: Blood Updated: 04/08/22 0613     Magnesium 2.1 mg/dL     CBC (No Diff) [[id number]]  (Abnormal) Collected: 04/08/22 0552    Specimen: Blood Updated: 04/08/22 0557     WBC 6.82 10*3/mm3      RBC 5.95 10*6/mm3      Hemoglobin  15.7 g/dL      Hematocrit 48.7 %      MCV 81.8 fL      MCH 26.4 pg      MCHC 32.2 g/dL      RDW 14.3 %      RDW-SD 41.7 fl      MPV 10.7 fL      Platelets 202 10*3/mm3     Troponin [[id number]]  (Normal) Collected: 04/07/22 2347    Specimen: Blood Updated: 04/08/22 0013     Troponin T <0.010 ng/mL     Narrative:      Troponin T Reference Range:  <= 0.03 ng/mL-   Negative for AMI  >0.03 ng/mL-     Abnormal for myocardial necrosis.  Clinicians would have to utilize clinical acumen, EKG, Troponin and serial changes to determine if it is an Acute Myocardial Infarction or myocardial injury due to an underlying chronic condition.       Results may be falsely decreased if patient taking Biotin.      TSH [[id number]]  (Normal) Collected: 04/07/22 1817    Specimen: Blood from Arm, Left Updated: 04/07/22 2231     TSH 2.260 uIU/mL     Hemoglobin A1c [[id number]]  (Abnormal) Collected: 04/07/22 1817    Specimen: Blood from Arm, Left Updated: 04/07/22 2220     Hemoglobin A1C 11.80 %     Narrative:      Hemoglobin A1C Ranges:    Increased Risk for Diabetes  5.7% to 6.4%  Diabetes                     >= 6.5%  Diabetic Goal                < 7.0%    Magnesium [[id number]]  (Normal) Collected: 04/07/22 1817    Specimen: Blood from Arm, Left Updated: 04/07/22 2215     Magnesium 2.3 mg/dL     BNP [[id number]]  (Normal) Collected: 04/07/22 1817    Specimen: Blood from Arm, Left Updated: 04/07/22 1853     proBNP 35.5 pg/mL     Narrative:      Among patients with dyspnea, NT-proBNP is highly sensitive for the detection of acute congestive heart failure. In addition NT-proBNP of <300 pg/ml effectively rules out acute congestive heart failure with 99% negative predictive value.    Results may be falsely decreased if patient taking Biotin.      Troponin [[id number]]  (Normal) Collected: 04/07/22 1817    Specimen: Blood from Arm, Left Updated: 04/07/22 1843     Troponin T <0.010 ng/mL     Narrative:      Troponin T Reference Range:  <= 0.03 ng/mL-    Negative for AMI  >0.03 ng/mL-     Abnormal for myocardial necrosis.  Clinicians would have to utilize clinical acumen, EKG, Troponin and serial changes to determine if it is an Acute Myocardial Infarction or myocardial injury due to an underlying chronic condition.       Results may be falsely decreased if patient taking Biotin.      Comprehensive Metabolic Panel [[id number]]  (Abnormal) Collected: 04/07/22 1817    Specimen: Blood from Arm, Left Updated: 04/07/22 1842     Glucose 273 mg/dL      BUN 17 mg/dL      Creatinine 1.17 mg/dL      Sodium 134 mmol/L      Potassium 4.4 mmol/L      Chloride 95 mmol/L      CO2 26.3 mmol/L      Calcium 9.8 mg/dL      Total Protein 7.2 g/dL      Albumin 4.60 g/dL      ALT (SGPT) 17 U/L      AST (SGOT) 13 U/L      Alkaline Phosphatase 77 U/L      Total Bilirubin 0.3 mg/dL      Globulin 2.6 gm/dL      A/G Ratio 1.8 g/dL      BUN/Creatinine Ratio 14.5     Anion Gap 12.7 mmol/L      eGFR 78.3 mL/min/1.73      Comment: National Kidney Foundation and American Society of Nephrology (ASN) Task Force recommended calculation based on the Chronic Kidney Disease Epidemiology Collaboration (CKD-EPI) equation refit without adjustment for race.       Narrative:      GFR Normal >60  Chronic Kidney Disease <60  Kidney Failure <15      Protime-INR [[id number]]  (Normal) Collected: 04/07/22 1817    Specimen: Blood from Arm, Left Updated: 04/07/22 1835     Protime 12.3 Seconds      INR 0.90    Narrative:      Therapeutic Ranges for INR: 2.0-3.0 (PT 20-30)                              2.5-3.5 (PT 25-34)    aPTT [[id number]]  (Normal) Collected: 04/07/22 1817    Specimen: Blood from Arm, Left Updated: 04/07/22 1835     PTT 27.7 seconds     Narrative:      PTT = The equivalent PTT values for the therapeutic range of heparin levels at 0.1 to 0.7 U/ml are 53 to 110 seconds.      CBC & Differential [[id number]]  (Abnormal) Collected: 04/07/22 1817    Specimen: Blood from Arm, Left Updated: 04/07/22 1827     Narrative:      The following orders were created for panel order CBC & Differential.  Procedure                               Abnormality         Status                     ---------                               -----------         ------                     CBC Auto Differential[[id number]]        Abnormal            Final result                 Please view results for these tests on the individual orders.    CBC Auto Differential [[id number]]  (Abnormal) Collected: 04/07/22 1817    Specimen: Blood from Arm, Left Updated: 04/07/22 1827     WBC 8.63 10*3/mm3      RBC 6.35 10*6/mm3      Hemoglobin 16.8 g/dL      Hematocrit 51.6 %      MCV 81.3 fL      MCH 26.5 pg      MCHC 32.6 g/dL      RDW 14.6 %      RDW-SD 40.7 fl      MPV 11.2 fL      Platelets 239 10*3/mm3      Neutrophil % 67.7 %      Lymphocyte % 21.0 %      Monocyte % 6.4 %      Eosinophil % 3.6 %      Basophil % 0.8 %      Immature Grans % 0.5 %      Neutrophils, Absolute 5.85 10*3/mm3      Lymphocytes, Absolute 1.81 10*3/mm3      Monocytes, Absolute 0.55 10*3/mm3      Eosinophils, Absolute 0.31 10*3/mm3      Basophils, Absolute 0.07 10*3/mm3      Immature Grans, Absolute 0.04 10*3/mm3      nRBC 0.0 /100 WBC         Imaging Results (Most Recent)     Procedure Component Value Units Date/Time    XR Chest 1 View [[id number]] Collected: 04/07/22 1835     Updated: 04/07/22 1837    Narrative:      CHEST X-RAY, 4/7/2022      HISTORY:    47-year-old male in the ED complaining of several day history of intermittent chest pain, heart palpitations. Atrial fibrillation is noted. Diabetes, morbid obesity, hypertension.    TECHNIQUE:  AP portable chest x-ray.    COMPARISON:  *  Chest x-ray, 11/4/2012.    FINDINGS:  Mild cardiomegaly. Pulmonary vascularity is normal. The lungs appear clear. No visible pulmonary edema, focal infiltrate or pleural effusion. Low lung volumes.      Impression:      Cardiomegaly. Lungs clear. No active disease.    Signer Name: Quinn Frankel,  MD   Signed: 4/7/2022 6:35 PM   Workstation Name: [id number]    [facility]        PROCEDURES: None    Condition on Discharge: Stable    Physical Exam at Discharge  Vital Signs  Temp:  [97.5 °F (36.4 °C)-97.8 °F (36.6 °C)] 97.8 °F (36.6 °C)  Heart Rate:  [] 70  Resp:  [13-24] 18  BP: (118-177)/() 128/77   Body mass index is 38.65 kg/m².    Physical Exam  Vitals reviewed.   Constitutional:       General: He is not in acute distress.     Appearance: He is obese. He is not ill-appearing.   HENT:      Head: Normocephalic and atraumatic.      Mouth/Throat:      Mouth: Mucous membranes are moist.   Eyes:      Extraocular Movements: Extraocular movements intact.      Pupils: Pupils are equal, round, and reactive to light.   Cardiovascular:      Rate and Rhythm: Normal rate and regular rhythm.   Pulmonary:      Effort: Pulmonary effort is normal. No respiratory distress.      Breath sounds: Normal breath sounds. No wheezing or rales.   Abdominal:      General: Abdomen is flat. Bowel sounds are normal. There is no distension.      Tenderness: There is no abdominal tenderness. There is no guarding.   Musculoskeletal:         General: No swelling.   Skin:     General: Skin is warm and dry.      Capillary Refill: Capillary refill takes less than 2 seconds.      Findings: No erythema.   Neurological:      General: No focal deficit present.      Mental Status: He is alert and oriented to person, place, and time.   Psychiatric:         Mood and Affect: Mood normal.       Discharge Disposition  Home    Visiting Nurse:    No     Home PT/OT:  No     Home Safety Evaluation:  No     DME  None new    Discharge Diet:      Dietary Orders (From admission, onward)     Start     Ordered    04/08/22 0904  Diet Regular; Cardiac, Consistent Carbohydrate  Diet Effective Now        Question Answer Comment   Diet Texture / Consistency Regular    Common Modifiers Cardiac    Common Modifiers Consistent  "Carbohydrate        04/08/22 0932                Activity at Discharge:  As tolerated    Pre-discharge education  Diabetic, Cardiac, medications, diet, follow-up    Follow-up Appointments  Future Appointments   Date Time Provider Department Center   5/16/2022  4:00 PM Quinn Stoner DO [facility]     Additional Instructions for the Follow-ups that You Need to Schedule     Ambulatory Referral to Sleep Medicine   As directed      Discharge Follow-up with PCP   As directed       Currently Documented PCP:    Quinn Stoner DO    PCP Phone Number:    [phone]     Follow Up Details: 1 week, needs repeat BMP         Discharge Follow-up with Specialty: cardiology   As directed      Specialty: cardiology    Follow Up Details: 5 weeks               Test Results Pending at Discharge       Yola Guerrero, KIAN  04/08/22  09:34 EDT    Time: Discharge 30 min (if over 30 minutes give explanation as to why it took greater than 30 minutes)    As of April 2021, as required by the Federal 21st Century Cures Act, medical records (including provider notes and laboratory/imaging results) are to be made available to patients and/or their designees as soon as the documents are signed/resulted. While the intention is to ensure transparency and to engage patients in their healthcare, this immediate access may create unintended consequences because this document uses language intended for communication between medical providers for interpretation with the entirety of the patient's clinical picture in mind. It is recommended that patients and/or their designees review all available information with their primary or specialist providers for explanation and to avoid misinterpretation of this information.     \"Dictated utilizing Dragon dictation\"      "

## 2022-04-08 NOTE — PROGRESS NOTES
"Adult Nutrition  Assessment/PES    Patient Name:  Santiago Zamora  YOB: 1975  MRN: [id number]  Admit Date:  4/7/2022    Assessment Date:  4/8/2022    Comments:  Edu given below on DM and CHF.  D/c noted.      Reason for Assessment     Row Name 04/08/22 1142          Reason for Assessment    Reason For Assessment diagnosis/disease state   list, HF list     Diagnosis cardiac disease;diabetes diagnosis/complications  Afib, uncontrolled DM hx CHF                Nutrition/Diet History     Row Name 04/08/22 1143          Nutrition/Diet History    Typical Intake (Food/Fluid/EN/PN) Spoke w pt & wife at bedside, about to be d/c. Had HF and DM for 15 yrs. Meds just got switched b/c don't work well after 3 to 5 yrs. Drinks diet drinks.                Anthropometrics     Row Name 04/08/22 1147 04/08/22 0815       Anthropometrics    Height -- 182.9 cm (72\")    Weight --  285# 129 kg (285 lb)    Additional Documentation --  2 yrs ago 303# per EMR data --       Ideal Body Weight (IBW)    Retired Ideal Body Weight (IBW) (kg) -- 82.07    Retired % Ideal Body Weight -- 157.52       Retired Body Mass Index (BMI)    Retired BMI (kg/m2) -- 38.73               Labs/Tests/Procedures/Meds     Row Name 04/08/22 1146          Labs/Procedures/Meds    Lab Results Reviewed reviewed     Lab Results Comments HgA1c 11.8 H            Diagnostic Tests/Procedures    Diagnostic Test/Procedure Reviewed reviewed            Medications    Pertinent Medications Reviewed reviewed     Pertinent Medications Comments glucotrol, jardiance, novolog                  Estimated/Assessed Needs - Anthropometrics     Row Name 04/08/22 1147 04/08/22 0815       Anthropometrics    Height -- 182.9 cm (72\")    Weight --  285# 129 kg (285 lb)    Additional Documentation --  2 yrs ago 303# per EMR data --       Estimated/Assessed Needs    Additional Documentation Estimated Calorie Needs (Group);Fluid Requirements (Group);Protein Requirements (Group) -- "       Estimated Calorie Needs    Estimated Calorie Requirement (kcal/day) 2218 kcal ( mifflin no factor obese )  222-249 gm CHO, 40-45% CHO --    Estimated Calorie Need Method [facility] --       Protein Requirements    Est Protein Requirement Amount (gms/kg) 0.8 gm protein  103 gm pro --       Fluid Requirements    Estimated Fluid Requirement Method RDA Method  2218 ml --               Nutrition Prescription Ordered     Row Name 04/08/22 1148          Nutrition Prescription PO    Common Modifiers Cardiac;Consistent Carbohydrate                Evaluation of Received Nutrient/Fluid Intake     Row Name 04/08/22 1148          Fluid Intake Evaluation    Other Fluid (mL) 830  IVF, insufficient data new admit            PO Evaluation    Number of Days PO Intake Evaluated Insufficient Data                     Problem/Interventions:   Problem 1     Row Name 04/08/22 1148          Nutrition Diagnoses Problem 1    Problem 1 Altered Nutrition Related to Labs     Etiology (related to) Medical Diagnosis     Endocrine DM2     Signs/Symptoms (evidenced by) Report/Observation;Biochemical     Specific Labs Noted HgbA1C                      Intervention Goal     Row Name 04/08/22 1149          Intervention Goal    General Provide information regarding MNT for treatment/condition;Improved nutrition related lab(s)     PO Tolerate PO;PO intake (%)     PO Intake % 50 %  or greater                Nutrition Intervention     Row Name 04/08/22 1149          Nutrition Intervention    RD/Tech Action Follow Tx progress                  Education/Evaluation     Row Name 04/08/22 1149          Education    Education Education topics;Advised regarding habits/behavior;Provided education regarding  Edu on HgA1c & goal to improve below 7 for overall health & prevent long term complications. Edu on making half plate veggies ( non starchy) for portion control. Edu on cont Diet/Zero/SF drinks. Edu avoid direct Na. Edu on Avoid processed/high Na  foods.     Provided education regarding Avoidance of associated complications;Healthy eating for diabetes;Medical diagnosis  Edu on daily wts & monitor signs of swelling/HF.     Education Topics Diabetes;Na+  Simple Rules Reduce Na, HF sheet, & DM Nutrition Plate Method provided     Advised Regarding Habits/Behavior Food choices;Seasoning food;Appropriate portions;Appropriate beverage  Edu improve health now for long term. Choose fresh/frozen veggies, pour liquid off canned. Try new veggies ( only like green beans).            Monitor/Evaluation    Monitor Per protocol;I&O;PO intake;Pertinent labs;Weight;Symptoms     Education Follow-up Other (comment)  pt & wife present, uncertain pt ready to make changes.                 Electronically signed by:  Jailyn Santos RD  04/08/22 11:52 EDT

## 2022-04-08 NOTE — CASE MANAGEMENT/SOCIAL WORK
Case Management Discharge Note      Final Note: dc home    Provided Post Acute Provider List?: N/A  Provided Post Acute Provider Quality & Resource List?: N/A    Selected Continued Care - Discharged on 4/8/2022 Admission date: 4/7/2022 - Discharge disposition: Home or Self Care    Destination    No services have been selected for the patient.              Durable Medical Equipment    No services have been selected for the patient.              Dialysis/Infusion    No services have been selected for the patient.              Home Medical Care    No services have been selected for the patient.              Therapy    No services have been selected for the patient.              Community Resources    No services have been selected for the patient.              Community & DME    No services have been selected for the patient.                       Final Discharge Disposition Code: 01 - home or self-care

## 2022-04-08 NOTE — OUTREACH NOTE
Prep Survey    Flowsheet Row Responses   [facility]levism facility patient discharged from? [place]   Is LACE score < 7 ? Yes   Emergency Room discharge w/ pulse ox? No   Eligibility [facility]   Date of Admission 04/07/22   Date of Discharge 04/08/22   Discharge Disposition Home or Self Care   Discharge diagnosis Atrial fibrillation with RVR  uncontrolled DM2   Does the patient have one of the following disease processes/diagnoses(primary or secondary)? Other   Does the patient have Home health ordered? No   Is there a DME ordered? No   Prep survey completed? Yes          SHYANNE RODRIGUEZ - Registered Nurse

## 2022-04-08 NOTE — CONSULTS
Date of Hospital Visit: [unfilled]ENC@  Encounter Provider: Gricelda Woods MD  Place of Service: [facility] CARDIOLOGY  Patient Name: Santiago Zamora  :1975  Referral Provider: No ref. provider found    Chief complaint    Presented with heart fluttering-atrial fibrillation    History of Present Illness    This is a 47-year-old obese male with past medical history of diabetes mellitus, hypertension, hyperlipidemia, anxiety, remote history of CHF with reported prior ejection fraction of 10%, atrial fibrillation.  He has not seen cardiology in some time but reported that he been released from them because his ejection fraction improved.    He presented to the emergency department with complaints of heart fluttering associated with lightheadedness and short windedness.  On arrival, his heart rate was 102 but then did elevate to 172 with a blood pressure initially 177/100, saturation 98% on room air.  This morning his heart rate is 64 beats minute with a blood pressure of 143/93.  He has had 2 - troponins with a normal proBNP, blood glucose was 273 with normal creatinine otherwise normal CMP, hemoglobin A1c 11.8, normal TSH, normal magnesium, normal INR, unremarkable CBC.  Echo this morning is pending.  Initial EKG showed atrial fibrillation with left anterior fascicular block.  EKG last night showed sinus rhythm with an incomplete left bundle branch block.    On arrival, he was placed on diltiazem drip.  He has no chest pain or pressure.  He now does not feel his heart racing or skipping his he is back in sinus rhythm.  He had no dizziness, near syncope or falls when he was in atrial fibrillation.  He denies nausea, diarrhea.  He has no orthopnea or PND.  He has no exertional dyspnea.  The only thing that he felt was his heart racing really.  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  He works about 60 to 70 hours a week.  When he had his  cardiomyopathy 15 years ago, he was drinking heavily and has not drank anything in 15 years.  He uses no tobacco or illicit drugs.  He used to weigh 400 pounds and has lost just over 100 pounds.  He is known to have sleep apnea but cannot tolerate CPAP mask.    Past Medical History:   Diagnosis Date   • Anxiety    • Cardiomyopathy (HCC)    • Depression    • Diabetes mellitus (HCC)    • Varicose veins        Past Surgical History:   Procedure Laterality Date   • CHOLECYSTECTOMY         Medications Prior to Admission   Medication Sig Dispense Refill Last Dose   • 1st Choice Lancets Super Thin misc USE TO TEST GLUCOSE DAILY 100 each 5    • aspirin 81 MG chewable tablet Chew.      • atorvastatin (LIPITOR) 10 MG tablet Take 1 tablet by mouth Daily. 90 tablet 1    • carvedilol (COREG) 25 MG tablet Take 1 tablet by mouth 2 (Two) Times a Day. 180 tablet 1    • citalopram (CeleXA) 40 MG tablet Take 1 tablet by mouth Daily. 90 tablet 1    • cyclobenzaprine (FLEXERIL) 10 MG tablet Take 1 tablet by mouth 3 (Three) Times a Day As Needed for Muscle Spasms. 90 tablet 0    • empagliflozin (Jardiance) 25 MG tablet tablet Take 1 tablet by mouth Daily. (Patient taking differently: Take 12.5 mg by mouth Daily.) 56 tablet 0    • glimepiride (Amaryl) 4 MG tablet Take 1 tablet by mouth Every Morning Before Breakfast. 30 tablet 1    • glucose blood (Accu-Chek Rebecca) test strip Test blood sugar once daily as instructed 100 each 5    • glucose monitor monitoring kit Use accu-chek Rebecca glucometer to check your blood sugar once daily 1 each 0    • lisinopril (PRINIVIL,ZESTRIL) 20 MG tablet Take 1 tablet by mouth Daily. 90 tablet 1    • metFORMIN ER (Glucophage XR) 500 MG 24 hr tablet Take 1 tablet by mouth Daily With Breakfast. 30 tablet 1    • tadalafil (Cialis) 5 MG tablet 1 po qd 30 tablet 0        Current Meds  Scheduled Meds:atorvastatin, 10 mg, Oral, Daily  carvedilol, 25 mg, Oral, BID  citalopram, 40 mg, Oral, Daily  dilTIAZem CD, 180  "mg, Oral, Nightly  empagliflozin, 12.5 mg, Oral, Daily  enoxaparin, 1 mg/kg, Subcutaneous, Q12H  glipizide, 10 mg, Oral, QAM AC  insulin aspart, 0-9 Units, Subcutaneous, TID AC  lisinopril, 20 mg, Oral, Daily  sodium chloride, 10 mL, Intravenous, Q12H  sodium chloride, 10 mL, Intravenous, Q12H      Continuous Infusions:dilTIAZem, 5-15 mg/hr, Last Rate: Stopped (04/08/22 0035)  sodium chloride, 100 mL/hr, Last Rate: 100 mL/hr (04/08/22 0400)      PRN Meds:.•  acetaminophen **OR** acetaminophen **OR** acetaminophen  •  acetaminophen **OR** acetaminophen **OR** acetaminophen  •  cyclobenzaprine  •  dextrose  •  dextrose  •  glucagon (human recombinant)  •  melatonin  •  nitroglycerin  •  ondansetron **OR** ondansetron  •  [COMPLETED] Insert peripheral IV **AND** sodium chloride  •  sodium chloride  •  sodium chloride  •  sodium chloride  •  sodium chloride    Allergies as of 04/07/2022 - Reviewed 04/07/2022   Allergen Reaction Noted   • Eggs or egg-derived products Swelling 08/25/2020   • Fish-derived products Swelling 08/25/2020       Social History     Socioeconomic History   • Marital status:    Tobacco Use   • Smoking status: Never Smoker   • Smokeless tobacco: Never Used   Substance and Sexual Activity   • Alcohol use: No   • Drug use: No   • Sexual activity: Defer       Family History   Problem Relation Age of Onset   • Hypertension Mother    • Atrial fibrillation Father    • Heart attack Father    • Diabetes Other        REVIEW OF SYSTEMS:   12 point ROS was performed and is negative except as outlined in HPI       Objective:   Temp:  [97.5 °F (36.4 °C)-97.6 °F (36.4 °C)] 97.6 °F (36.4 °C)  Heart Rate:  [] 64  Resp:  [13-24] 18  BP: (118-177)/() 143/93  Body mass index is 38.69 kg/m².  Flowsheet Rows    Flowsheet Row First Filed Value   Admission Height 182.9 cm (72\") Documented at 04/07/2022 1751   Admission Weight 132 kg (290 lb) Documented at 04/07/2022 1751        Vitals:    04/08/22 0700 "   BP:    Pulse: 64   Resp:    Temp:    SpO2: 94%       General Appearance:    Alert, cooperative, in no acute distress   Head:    Normocephalic, without obvious abnormality, atraumatic   Eyes:            Lids and lashes normal, conjunctivae and sclerae normal, no   icterus, no pallor   Ears:    Ears appear intact with no abnormalities noted   Throat:   No oral lesions, no thrush, oral mucosa moist   Neck:   No adenopathy, supple, trachea midline, no thyromegaly, no   carotid bruit, no JVD   Back:     No kyphosis present, no scoliosis present, no skin lesions, erythema or scars, no tenderness topalpation, range of motion normal   Lungs:     Clear to auscultation,respirations regular, even and unlabored    Heart:    Regular rhythm and normal rate, normal S1 and S2, no murmur, no gallop, no rub, no click   Chest Wall:    No abnormalities observed   Abdomen:     Normal bowel sounds, no masses, no organomegaly, soft, nontender, nondistended, no guarding, no rebound  tenderness   Extremities:   Moves all extremities well, no edema, no cyanosis, no redness   Pulses:   Pulses palpable and equal bilaterally. Normal radial, carotid, dorsalis pedis and posterior tibial pulses bilaterally.    Skin:  Psychiatric:   No bleeding, bruising or rash    Alert and oriented x 3, normal mood and affect                 Lab Review:      Results from last 7 days   Lab Units 04/08/22  0552   SODIUM mmol/L 134*   POTASSIUM mmol/L 4.2   CHLORIDE mmol/L 99   CO2 mmol/L 26.6   BUN mg/dL 16   CREATININE mg/dL 0.88   CALCIUM mg/dL 9.1   BILIRUBIN mg/dL 0.4   ALK PHOS U/L 64   ALT (SGPT) U/L 15   AST (SGOT) U/L 12   GLUCOSE mg/dL 214*     Results from last 7 days   Lab Units 04/07/22  2347 04/07/22  1817   TROPONIN T ng/mL <0.010 <0.010     @[id number]@  Results from last 7 days   Lab Units 04/08/22  0552 04/07/22  1817   WBC 10*3/mm3 6.82 8.63   HEMOGLOBIN g/dL 15.7 16.8   HEMATOCRIT % 48.7 51.6*   PLATELETS 10*3/mm3 202 239     Results from last  7 days   Lab Units 04/07/22  1817   INR  0.90   APTT seconds 27.7     Results from last 7 days   Lab Units 04/08/22  0552   MAGNESIUM mg/dL 2.1       I personally viewed and interpreted the patient's EKG/Telemetry data  )  Patient Active Problem List   Diagnosis   • Benign essential HTN   • Cervical nerve root disorder   • CCF (congestive cardiac failure) ([facility])   • Mixed hyperlipidemia   • Vitamin D deficiency   • Depression with anxiety   • Type 2 diabetes mellitus with hyperglycemia, without long-term current use of insulin ([facility])   • Morbid exogenous obesity ([facility])   • Atrial fibrillation with RVR ([facility])   • Obesity (BMI 30-39.9)     Assessment and Plan:    1. Atrial fibrillation, now back in normal sinus rhythm, stop diltiazem drip maintain oral diltiazem and carvedilol.  Check echocardiogram.  TSH is normal and magnesium is normal.  2. Hypertension, not well controlled, add spironolactone 12.5 mg daily, watch potassium levels.  3. Diabetes mellitus, not well controlled, increase Jardiance to 25 mg daily  4. Hyperlipidemia, with diabetes, he really has been on atorvastatin 40 mg daily, will increase his medication.  5. History of cardiomyopathy, repeat echocardiogram  6. Obesity-advised to continue to try to lose weight.  7. VIDHYA, untreated    No evidence of cardiomyopathy.  His [id number] score currently is 2, giving a 2.2% annual risk of stroke which is high but this is the only documented instance of atrial fibrillation for him.  Would recommend that he be discharged with a 2-week Zio patch monitor.  Medication adjustments have been made including increasing Jardiance and the addition of spironolactone.  Watch for hyperkalemia, recommend a 2-week BMP.  Refer back to sleep medicine as he may be candidate for inspire.  See us in the office in 5 weeks.  Okay to go home today.  No ischemic evaluation is needed at this time.    Gricelda Woods MD  04/08/22  07:52 EDT.  Time spent in reviewing chart, discussion  and examination:

## 2022-04-08 NOTE — PLAN OF CARE
Goal Outcome Evaluation:           Progress: improving  Outcome Evaluation: Upon admission to the unit, has been in NSR rate 70's and 80's. Now 60's.  Was given PO cardizem and then  stopped the drip. Troponin remains neg. B/P WNL. UOP good.  AM labs pending.

## 2022-04-08 NOTE — H&P
[facility] HOSPITALIST     Quinn Stoner DO    CHIEF COMPLAINT:     Heart fluttering    HISTORY OF PRESENT ILLNESS:    Patient is a 47 year old obese male with a past medical history of diabetes mellitus, hypertension, hyperlipidemia, and anxiety. Patient reports about 15 years ago he had CHF with an ejection fraction of ~10% and atrial fibrillation. He followed with cardiology for approximately 5 years and was released from them about 10 years ago. He states his heart function had completely recovered and he has not had any issues until about 2-3 days ago. Patient states initially he had some intermittent heart fluttering, that was worsening and became severe today. He states it has been constant all day until treatment in the ED. Patient reports some associated shortness of breath and lightheadedness. He denies any chest pain.    Past Medical History:   Diagnosis Date   • Anxiety    • Cardiomyopathy (HCC)    • Depression    • Diabetes mellitus (HCC)    • Varicose veins      Past Surgical History:   Procedure Laterality Date   • CHOLECYSTECTOMY       Family History   Problem Relation Age of Onset   • Hypertension Mother    • Atrial fibrillation Father    • Heart attack Father    • Diabetes Other      Social History     Tobacco Use   • Smoking status: Never Smoker   • Smokeless tobacco: Never Used   Substance Use Topics   • Alcohol use: No   • Drug use: No     Medications Prior to Admission   Medication Sig Dispense Refill Last Dose   • 1st Choice Lancets Super Thin misc USE TO TEST GLUCOSE DAILY 100 each 5    • aspirin 81 MG chewable tablet Chew.      • atorvastatin (LIPITOR) 10 MG tablet Take 1 tablet by mouth Daily. 90 tablet 1    • carvedilol (COREG) 25 MG tablet Take 1 tablet by mouth 2 (Two) Times a Day. 180 tablet 1    • citalopram (CeleXA) 40 MG tablet Take 1 tablet by mouth Daily. 90 tablet 1    • cyclobenzaprine (FLEXERIL) 10 MG tablet Take 1 tablet by mouth 3 (Three) Times a Day As  "Needed for Muscle Spasms. 90 tablet 0    • empagliflozin (Jardiance) 25 MG tablet tablet Take 1 tablet by mouth Daily. (Patient taking differently: Take 12.5 mg by mouth Daily.) 56 tablet 0    • glimepiride (Amaryl) 4 MG tablet Take 1 tablet by mouth Every Morning Before Breakfast. 30 tablet 1    • glucose blood (Accu-Chek Rebecca) test strip Test blood sugar once daily as instructed 100 each 5    • glucose monitor monitoring kit Use accu-chek Rebecca glucometer to check your blood sugar once daily 1 each 0    • lisinopril (PRINIVIL,ZESTRIL) 20 MG tablet Take 1 tablet by mouth Daily. 90 tablet 1    • metFORMIN ER (Glucophage XR) 500 MG 24 hr tablet Take 1 tablet by mouth Daily With Breakfast. 30 tablet 1    • tadalafil (Cialis) 5 MG tablet 1 po qd 30 tablet 0      Allergies:  Eggs or egg-derived products and Fish-derived products      There is no immunization history on file for this patient.        REVIEW OF SYSTEMS:    Please see the above history of present illness for pertinent positives and negatives.  The remainder of the patient's systems have been reviewed and are negative.     Vital Signs  Temp:  [97.5 °F (36.4 °C)] 97.5 °F (36.4 °C)  Heart Rate:  [] 108  Resp:  [13-24] 16  BP: (120-177)/() 143/102    Flowsheet Rows    Flowsheet Row First Filed Value   Admission Height 182.9 cm (72\") Documented at 04/07/2022 1751   Admission Weight 132 kg (290 lb) Documented at 04/07/2022 1751             Physical Exam:    Physical Exam  Vitals reviewed.   Constitutional:       General: He is not in acute distress.     Appearance: He is obese.   HENT:      Head: Normocephalic and atraumatic.      Mouth/Throat:      Mouth: Mucous membranes are moist.   Eyes:      General: No scleral icterus.     Pupils: Pupils are equal, round, and reactive to light.   Cardiovascular:      Rate and Rhythm: Regular rhythm. Tachycardia present.      Heart sounds: No murmur heard.  Pulmonary:      Effort: Pulmonary effort is normal. No " respiratory distress.      Breath sounds: No wheezing or rales.   Abdominal:      General: Bowel sounds are normal. There is no distension.      Palpations: Abdomen is soft.      Tenderness: There is no abdominal tenderness.   Musculoskeletal:      Right lower leg: No edema.      Left lower leg: No edema.   Skin:     General: Skin is warm and dry.      Findings: No rash.   Neurological:      General: No focal deficit present.      Mental Status: He is alert. Mental status is at baseline.      Cranial Nerves: No cranial nerve deficit.   Psychiatric:         Mood and Affect: Mood normal.         Behavior: Behavior normal.               Results Review:    I reviewed the patient's new clinical results.  Lab Results (most recent)     Procedure Component Value Units Date/Time    BNP [[id number]]  (Normal) Collected: 04/07/22 1817    Specimen: Blood from Arm, Left Updated: 04/07/22 1853     proBNP 35.5 pg/mL     Narrative:      Among patients with dyspnea, NT-proBNP is highly sensitive for the detection of acute congestive heart failure. In addition NT-proBNP of <300 pg/ml effectively rules out acute congestive heart failure with 99% negative predictive value.    Results may be falsely decreased if patient taking Biotin.      Troponin [[id number]]  (Normal) Collected: 04/07/22 1817    Specimen: Blood from Arm, Left Updated: 04/07/22 1843     Troponin T <0.010 ng/mL     Narrative:      Troponin T Reference Range:  <= 0.03 ng/mL-   Negative for AMI  >0.03 ng/mL-     Abnormal for myocardial necrosis.  Clinicians would have to utilize clinical acumen, EKG, Troponin and serial changes to determine if it is an Acute Myocardial Infarction or myocardial injury due to an underlying chronic condition.       Results may be falsely decreased if patient taking Biotin.      Comprehensive Metabolic Panel [[id number]]  (Abnormal) Collected: 04/07/22 1817    Specimen: Blood from Arm, Left Updated: 04/07/22 1842     Glucose 273 mg/dL      BUN  17 mg/dL      Creatinine 1.17 mg/dL      Sodium 134 mmol/L      Potassium 4.4 mmol/L      Chloride 95 mmol/L      CO2 26.3 mmol/L      Calcium 9.8 mg/dL      Total Protein 7.2 g/dL      Albumin 4.60 g/dL      ALT (SGPT) 17 U/L      AST (SGOT) 13 U/L      Alkaline Phosphatase 77 U/L      Total Bilirubin 0.3 mg/dL      Globulin 2.6 gm/dL      A/G Ratio 1.8 g/dL      BUN/Creatinine Ratio 14.5     Anion Gap 12.7 mmol/L      eGFR 78.3 mL/min/1.73      Comment: National Kidney Foundation and American Society of Nephrology (ASN) Task Force recommended calculation based on the Chronic Kidney Disease Epidemiology Collaboration (CKD-EPI) equation refit without adjustment for race.       Narrative:      GFR Normal >60  Chronic Kidney Disease <60  Kidney Failure <15      Protime-INR [[id number]]  (Normal) Collected: 04/07/22 1817    Specimen: Blood from Arm, Left Updated: 04/07/22 1835     Protime 12.3 Seconds      INR 0.90    Narrative:      Therapeutic Ranges for INR: 2.0-3.0 (PT 20-30)                              2.5-3.5 (PT 25-34)    aPTT [[id number]]  (Normal) Collected: 04/07/22 1817    Specimen: Blood from Arm, Left Updated: 04/07/22 1835     PTT 27.7 seconds     Narrative:      PTT = The equivalent PTT values for the therapeutic range of heparin levels at 0.1 to 0.7 U/ml are 53 to 110 seconds.      CBC & Differential [[id number]]  (Abnormal) Collected: 04/07/22 1817    Specimen: Blood from Arm, Left Updated: 04/07/22 1827    Narrative:      The following orders were created for panel order CBC & Differential.  Procedure                               Abnormality         Status                     ---------                               -----------         ------                     CBC Auto Differential[[id number]]        Abnormal            Final result                 Please view results for these tests on the individual orders.    CBC Auto Differential [[id number]]  (Abnormal) Collected: 04/07/22 1817    Specimen: Blood  from Arm, Left Updated: 04/07/22 1827     WBC 8.63 10*3/mm3      RBC 6.35 10*6/mm3      Hemoglobin 16.8 g/dL      Hematocrit 51.6 %      MCV 81.3 fL      MCH 26.5 pg      MCHC 32.6 g/dL      RDW 14.6 %      RDW-SD 40.7 fl      MPV 11.2 fL      Platelets 239 10*3/mm3      Neutrophil % 67.7 %      Lymphocyte % 21.0 %      Monocyte % 6.4 %      Eosinophil % 3.6 %      Basophil % 0.8 %      Immature Grans % 0.5 %      Neutrophils, Absolute 5.85 10*3/mm3      Lymphocytes, Absolute 1.81 10*3/mm3      Monocytes, Absolute 0.55 10*3/mm3      Eosinophils, Absolute 0.31 10*3/mm3      Basophils, Absolute 0.07 10*3/mm3      Immature Grans, Absolute 0.04 10*3/mm3      nRBC 0.0 /100 WBC           Imaging Results (Most Recent)     Procedure Component Value Units Date/Time    XR Chest 1 View [[id number]] Collected: 04/07/22 1835     Updated: 04/07/22 1837    Narrative:      CHEST X-RAY, 4/7/2022      HISTORY:    47-year-old male in the ED complaining of several day history of intermittent chest pain, heart palpitations. Atrial fibrillation is noted. Diabetes, morbid obesity, hypertension.    TECHNIQUE:  AP portable chest x-ray.    COMPARISON:  *  Chest x-ray, 11/4/2012.    FINDINGS:  Mild cardiomegaly. Pulmonary vascularity is normal. The lungs appear clear. No visible pulmonary edema, focal infiltrate or pleural effusion. Low lung volumes.      Impression:      Cardiomegaly. Lungs clear. No active disease.    Signer Name: Quinn Frankel MD   Signed: 4/7/2022 6:35 PM   Workstation Name: [email or web address]    [facility]        reviewed    ECG/EMG Results (most recent)     Procedure Component Value Units Date/Time    ECG 12 Lead [[id number]] Collected: 04/07/22 1752     Updated: 04/07/22 1929     QT Interval 295 ms     Narrative:      HEART RATE= 165  bpm  RR Interval= 364  ms  FL Interval=   ms  P Horizontal Axis=   deg  P Front Axis=   deg  QRSD Interval= 107  ms  QT Interval= 295  ms  QRS Axis= -24  deg  T  Wave Axis= 157  deg  - ABNORMAL ECG -  Atrial fibrillation - new  Paired ventricular premature complexes  Borderline left axis deviation  Repolarization abnormality, prob rate related  Electronically Signed By: Joselito Mattson ([facility]) 07-Apr-2022 19:28:48  Date and Time of Study: 2022-04-07 17:52:09        EKG personally reviewed and shows atrial fibrillation with rapid rate     Assessment/Plan   Active Hospital Problems    Diagnosis  POA   • **Atrial fibrillation with RVR (HCC) [I48.91]  Yes   • Obesity (BMI 30-39.9) [E66.9]  Yes   • Type 2 diabetes mellitus with hyperglycemia, without long-term current use of insulin (HCC) [E11.65]  Yes   • Benign essential HTN [I10]  Yes   • Depression with anxiety [F41.8]  Yes   • Mixed hyperlipidemia [E78.2]  Yes      Resolved Hospital Problems   No resolved problems to display.       Afib with RVR  - continue Cardizem drip  - check TSH and Magnesium  - echocardiogram in am  - consult cardiology given patient's history   - IV fluids    Hypertension, Hyperlipidemia, Hx of cardiomyopathy   - continue aspirin, statin, carvedilol, and lisinopril  - monitor bp with routine vital signs and make adjustments as needed     Diabetes Mellitus type 2 with hyperglycemia  - check Hgb A1c  - continue jardiance, glipizide  - hold metformin  - add SSI  - monitor with routine accu-checks and make adjustments as needed     Anxiety  - continue citalopram     Obesity   -  on lifestyle changes as appropriate     DVT prophylaxis  - enoxaparin     I discussed the patient's findings and my recommendations with patient and his wife at bedside      This patient has been examined wearing appropriate Personal Protective Equipment . 04/07/22      Alin Piña DO  04/07/22  21:22 EDT

## 2022-04-08 NOTE — CASE MANAGEMENT/SOCIAL WORK
Discharge Planning Assessment  GABRIEL Haley     Patient Name: Santiago Zamora  MRN: [id number]  Today's Date: 4/8/2022    Admit Date: 4/7/2022     Discharge Needs Assessment     Row Name 04/08/22 1218       Living Environment    Current Living Arrangements home    Primary Care Provided by self    Provides Primary Care For no one    Family Caregiver if Needed spouse    Quality of Family Relationships involved;supportive       Resource/Environmental Concerns    Resource/Environmental Concerns none    Transportation Concerns none       Transition Planning    Patient/Family Anticipates Transition to home with family    Patient/Family Anticipated Services at Transition none    Transportation Anticipated family or friend will provide       Discharge Needs Assessment    Readmission Within the Last 30 Days no previous admission in last 30 days    Equipment Currently Used at Home none    Concerns to be Addressed no discharge needs identified    Equipment Needed After Discharge none    Provided Post Acute Provider List? N/A    Provided Post Acute Provider Quality & Resource List? N/A               Discharge Plan     Row Name 04/08/22 1219       Plan    Plan plan home with family    Patient/Family in Agreement with Plan yes    Plan Comments Spoke with patient at bedside, He is dressed and anticipating dc this morning. Face sheet verified. Patient lives in a home with his wife and is independent of ADLs including driving and working. He denies use of DME/HH/Rehab. He does not have a living will and is not interested in creating one. He uses [facility] [place] and denies issues obtaining medications. He does not anticipate ay needs at dc. CM will continue to follow.              Continued Care and Services - Discharged on 4/8/2022 Admission date: 4/7/2022 - Discharge disposition: Home or Self Care   Coordination has not been started for this encounter.       Expected Discharge Date and Time     Expected Discharge Date  Expected Discharge Time    Apr 8, 2022          Demographic Summary     Row Name 04/08/22 1213       General Information    Admission Type observation    Arrived From home    Referral Source admission list    Preferred Language English       Contact Information    Permission Granted to Share Info With                Functional Status    No documentation.                Psychosocial    No documentation.                Abuse/Neglect    No documentation.                Legal    No documentation.                Substance Abuse    No documentation.                Patient Forms    No documentation.                   Ayad Odom RN

## 2022-04-11 ENCOUNTER — TRANSITIONAL CARE MANAGEMENT TELEPHONE ENCOUNTER (OUTPATIENT)
Dept: CALL CENTER | Facility: HOSPITAL | Age: 47
End: 2022-04-11

## 2022-04-11 NOTE — OUTREACH NOTE
Call Center TCM Note    Flowsheet Row Responses   [facility] patient discharged from? [place]   Does the patient have one of the following disease processes/diagnoses(primary or secondary)? Other   TCM attempt successful? No   Unsuccessful attempts Attempt 1          Sushila Grier LPN    4/11/2022, 13:05 EDT

## 2022-04-11 NOTE — OUTREACH NOTE
Call Center TCM Note    Flowsheet Row Responses   [facility] patient discharged from? [place]   Does the patient have one of the following disease processes/diagnoses(primary or secondary)? Other   TCM attempt successful? No   Unsuccessful attempts Attempt 2          Sushila Grier LPN    4/11/2022, 16:41 EDT

## 2022-04-12 ENCOUNTER — TRANSITIONAL CARE MANAGEMENT TELEPHONE ENCOUNTER (OUTPATIENT)
Dept: CALL CENTER | Facility: HOSPITAL | Age: 47
End: 2022-04-12

## 2022-04-12 NOTE — OUTREACH NOTE
Call Center TCM Note    Flowsheet Row Responses   [facility] patient discharged from? [place]   Does the patient have one of the following disease processes/diagnoses(primary or secondary)? Other   TCM attempt successful? No   Unsuccessful attempts Attempt 3          Christopher Montgomery RN    4/12/2022, 16:21 EDT

## 2022-04-13 ENCOUNTER — HOSPITAL ENCOUNTER (OUTPATIENT)
Facility: HOSPITAL | Age: 47
Setting detail: OBSERVATION
Discharge: HOME | End: 2022-04-15
Attending: EMERGENCY MEDICINE | Admitting: INTERNAL MEDICINE

## 2022-04-13 ENCOUNTER — APPOINTMENT (OUTPATIENT)
Dept: GENERAL RADIOLOGY | Facility: HOSPITAL | Age: 47
End: 2022-04-13

## 2022-04-13 DIAGNOSIS — I48.0 PAROXYSMAL ATRIAL FIBRILLATION: Primary | ICD-10-CM

## 2022-04-13 DIAGNOSIS — I47.20 V TACH: ICD-10-CM

## 2022-04-13 LAB
ALBUMIN SERPL-MCNC: 4.2 G/DL (ref 3.5–5.2)
ALBUMIN/GLOB SERPL: 1.3 G/DL
ALP SERPL-CCNC: 67 U/L (ref 39–117)
ALT SERPL W P-5'-P-CCNC: 16 U/L (ref 1–41)
ANION GAP SERPL CALCULATED.3IONS-SCNC: 13 MMOL/L (ref 5–15)
ANION GAP SERPL CALCULATED.3IONS-SCNC: 14.9 MMOL/L (ref 5–15)
AST SERPL-CCNC: 15 U/L (ref 1–40)
BASOPHILS # BLD AUTO: 0.02 10*3/MM3 (ref 0–0.2)
BASOPHILS NFR BLD AUTO: 0.2 % (ref 0–1.5)
BILIRUB SERPL-MCNC: 0.4 MG/DL (ref 0–1.2)
BUN SERPL-MCNC: 17 MG/DL (ref 6–20)
BUN SERPL-MCNC: 20 MG/DL (ref 6–20)
BUN/CREAT SERPL: 21.3 (ref 7–25)
BUN/CREAT SERPL: 23 (ref 7–25)
CALCIUM SPEC-SCNC: 8.5 MG/DL (ref 8.6–10.5)
CALCIUM SPEC-SCNC: 9.7 MG/DL (ref 8.6–10.5)
CHLORIDE SERPL-SCNC: 100 MMOL/L (ref 98–107)
CHLORIDE SERPL-SCNC: 101 MMOL/L (ref 98–107)
CO2 SERPL-SCNC: 21.1 MMOL/L (ref 22–29)
CO2 SERPL-SCNC: 22 MMOL/L (ref 22–29)
CREAT SERPL-MCNC: 0.74 MG/DL (ref 0.76–1.27)
CREAT SERPL-MCNC: 0.94 MG/DL (ref 0.76–1.27)
DEPRECATED RDW RBC AUTO: 41.9 FL (ref 37–54)
EGFRCR SERPLBLD CKD-EPI 2021: 101.9 ML/MIN/1.73
EGFRCR SERPLBLD CKD-EPI 2021: 113.9 ML/MIN/1.73
EOSINOPHIL # BLD AUTO: 0.29 10*3/MM3 (ref 0–0.4)
EOSINOPHIL NFR BLD AUTO: 3.1 % (ref 0.3–6.2)
ERYTHROCYTE [DISTWIDTH] IN BLOOD BY AUTOMATED COUNT: 14 % (ref 12.3–15.4)
EXPIRATION DATE: NORMAL
GLOBULIN UR ELPH-MCNC: 3.2 GM/DL
GLUCOSE BLDC GLUCOMTR-MCNC: 98 MG/DL (ref 70–130)
GLUCOSE SERPL-MCNC: 175 MG/DL (ref 65–99)
GLUCOSE SERPL-MCNC: 214 MG/DL (ref 65–99)
HCT VFR BLD AUTO: 54.4 % (ref 37.5–51)
HGB BLD-MCNC: 17.3 G/DL (ref 13–17.7)
IMM GRANULOCYTES # BLD AUTO: 0.03 10*3/MM3 (ref 0–0.05)
IMM GRANULOCYTES NFR BLD AUTO: 0.3 % (ref 0–0.5)
LYMPHOCYTES # BLD AUTO: 2.01 10*3/MM3 (ref 0.7–3.1)
LYMPHOCYTES NFR BLD AUTO: 21.5 % (ref 19.6–45.3)
Lab: NORMAL
MAGNESIUM SERPL-MCNC: 2.2 MG/DL (ref 1.6–2.6)
MCH RBC QN AUTO: 26.1 PG (ref 26.6–33)
MCHC RBC AUTO-ENTMCNC: 31.8 G/DL (ref 31.5–35.7)
MCV RBC AUTO: 82.1 FL (ref 79–97)
MONOCYTES # BLD AUTO: 0.68 10*3/MM3 (ref 0.1–0.9)
MONOCYTES NFR BLD AUTO: 7.3 % (ref 5–12)
NEUTROPHILS NFR BLD AUTO: 6.31 10*3/MM3 (ref 1.7–7)
NEUTROPHILS NFR BLD AUTO: 67.6 % (ref 42.7–76)
PLATELET # BLD AUTO: 290 10*3/MM3 (ref 140–450)
PMV BLD AUTO: 11.1 FL (ref 6–12)
POTASSIUM SERPL-SCNC: 3.9 MMOL/L (ref 3.5–5.2)
POTASSIUM SERPL-SCNC: 4.8 MMOL/L (ref 3.5–5.2)
PROT SERPL-MCNC: 7.4 G/DL (ref 6–8.5)
QT INTERVAL: 301 MS
QT INTERVAL: 372 MS
QT INTERVAL: 386 MS
RBC # BLD AUTO: 6.63 10*6/MM3 (ref 4.14–5.8)
SARS-COV-2 RNA PNL SPEC NAA+PROBE: NOT DETECTED
SODIUM SERPL-SCNC: 135 MMOL/L (ref 136–145)
SODIUM SERPL-SCNC: 137 MMOL/L (ref 136–145)
TROPONIN T SERPL-MCNC: <0.01 NG/ML (ref 0–0.03)
TROPONIN T SERPL-MCNC: <0.01 NG/ML (ref 0–0.03)
WBC NRBC COR # BLD: 9.34 10*3/MM3 (ref 3.4–10.8)

## 2022-04-13 PROCEDURE — 93010 ELECTROCARDIOGRAM REPORT: CPT | Performed by: INTERNAL MEDICINE

## 2022-04-13 PROCEDURE — 99283 EMERGENCY DEPT VISIT LOW MDM: CPT

## 2022-04-13 PROCEDURE — G0378 HOSPITAL OBSERVATION PER HR: HCPCS

## 2022-04-13 PROCEDURE — 84484 ASSAY OF TROPONIN QUANT: CPT | Performed by: EMERGENCY MEDICINE

## 2022-04-13 PROCEDURE — 93005 ELECTROCARDIOGRAM TRACING: CPT | Performed by: EMERGENCY MEDICINE

## 2022-04-13 PROCEDURE — 83735 ASSAY OF MAGNESIUM: CPT | Performed by: EMERGENCY MEDICINE

## 2022-04-13 PROCEDURE — 94761 N-INVAS EAR/PLS OXIMETRY MLT: CPT

## 2022-04-13 PROCEDURE — 80053 COMPREHEN METABOLIC PANEL: CPT | Performed by: EMERGENCY MEDICINE

## 2022-04-13 PROCEDURE — 85025 COMPLETE CBC W/AUTO DIFF WBC: CPT | Performed by: EMERGENCY MEDICINE

## 2022-04-13 PROCEDURE — 96366 THER/PROPH/DIAG IV INF ADDON: CPT

## 2022-04-13 PROCEDURE — 96365 THER/PROPH/DIAG IV INF INIT: CPT

## 2022-04-13 PROCEDURE — 82962 GLUCOSE BLOOD TEST: CPT

## 2022-04-13 PROCEDURE — 94799 UNLISTED PULMONARY SVC/PX: CPT

## 2022-04-13 PROCEDURE — 93005 ELECTROCARDIOGRAM TRACING: CPT | Performed by: INTERNAL MEDICINE

## 2022-04-13 PROCEDURE — 25010000002 AMIODARONE IN DEXTROSE 5% 150-4.21 MG/100ML-% SOLUTION

## 2022-04-13 PROCEDURE — 99284 EMERGENCY DEPT VISIT MOD MDM: CPT

## 2022-04-13 PROCEDURE — C9803 HOPD COVID-19 SPEC COLLECT: HCPCS

## 2022-04-13 PROCEDURE — 99285 EMERGENCY DEPT VISIT HI MDM: CPT

## 2022-04-13 PROCEDURE — 99220 PR INITIAL OBSERVATION CARE/DAY 70 MINUTES: CPT | Performed by: INTERNAL MEDICINE

## 2022-04-13 PROCEDURE — 25010000002 ENOXAPARIN PER 10 MG: Performed by: INTERNAL MEDICINE

## 2022-04-13 PROCEDURE — 96375 TX/PRO/DX INJ NEW DRUG ADDON: CPT

## 2022-04-13 PROCEDURE — 87635 SARS-COV-2 COVID-19 AMP PRB: CPT | Performed by: EMERGENCY MEDICINE

## 2022-04-13 PROCEDURE — 25010000002 AMIODARONE IN DEXTROSE 5% 360-4.14 MG/200ML-% SOLUTION: Performed by: INTERNAL MEDICINE

## 2022-04-13 PROCEDURE — 25010000002 AMIODARONE IN DEXTROSE 5% 360-4.14 MG/200ML-% SOLUTION: Performed by: EMERGENCY MEDICINE

## 2022-04-13 PROCEDURE — 96372 THER/PROPH/DIAG INJ SC/IM: CPT

## 2022-04-13 PROCEDURE — 96376 TX/PRO/DX INJ SAME DRUG ADON: CPT

## 2022-04-13 PROCEDURE — 84484 ASSAY OF TROPONIN QUANT: CPT | Performed by: INTERNAL MEDICINE

## 2022-04-13 PROCEDURE — 71045 X-RAY EXAM CHEST 1 VIEW: CPT

## 2022-04-13 PROCEDURE — 99291 CRITICAL CARE FIRST HOUR: CPT | Performed by: EMERGENCY MEDICINE

## 2022-04-13 RX ORDER — POTASSIUM CHLORIDE 20 MEQ/1
40 TABLET, EXTENDED RELEASE ORAL AS NEEDED
Status: DISCONTINUED | OUTPATIENT
Start: 2022-04-13 | End: 2022-04-15 | Stop reason: HOSPADM

## 2022-04-13 RX ORDER — CYCLOBENZAPRINE HCL 10 MG
10 TABLET ORAL 3 TIMES DAILY PRN
Status: DISCONTINUED | OUTPATIENT
Start: 2022-04-13 | End: 2022-04-15 | Stop reason: HOSPADM

## 2022-04-13 RX ORDER — MAGNESIUM SULFATE 1 G/100ML
1 INJECTION INTRAVENOUS AS NEEDED
Status: DISCONTINUED | OUTPATIENT
Start: 2022-04-13 | End: 2022-04-15 | Stop reason: HOSPADM

## 2022-04-13 RX ORDER — NICOTINE POLACRILEX 4 MG
15 LOZENGE BUCCAL
Status: DISCONTINUED | OUTPATIENT
Start: 2022-04-13 | End: 2022-04-15 | Stop reason: HOSPADM

## 2022-04-13 RX ORDER — ATORVASTATIN CALCIUM 40 MG/1
40 TABLET, FILM COATED ORAL DAILY
Status: DISCONTINUED | OUTPATIENT
Start: 2022-04-13 | End: 2022-04-15 | Stop reason: HOSPADM

## 2022-04-13 RX ORDER — ASPIRIN 81 MG/1
81 TABLET, CHEWABLE ORAL DAILY
Status: DISCONTINUED | OUTPATIENT
Start: 2022-04-13 | End: 2022-04-14

## 2022-04-13 RX ORDER — SODIUM CHLORIDE 9 MG/ML
40 INJECTION, SOLUTION INTRAVENOUS AS NEEDED
Status: DISCONTINUED | OUTPATIENT
Start: 2022-04-13 | End: 2022-04-15 | Stop reason: HOSPADM

## 2022-04-13 RX ORDER — CARVEDILOL 12.5 MG/1
25 TABLET ORAL 2 TIMES DAILY
Status: DISCONTINUED | OUTPATIENT
Start: 2022-04-13 | End: 2022-04-14

## 2022-04-13 RX ORDER — POTASSIUM CHLORIDE 7.45 MG/ML
10 INJECTION INTRAVENOUS
Status: DISCONTINUED | OUTPATIENT
Start: 2022-04-13 | End: 2022-04-15 | Stop reason: HOSPADM

## 2022-04-13 RX ORDER — MAGNESIUM SULFATE HEPTAHYDRATE 40 MG/ML
2 INJECTION, SOLUTION INTRAVENOUS AS NEEDED
Status: DISCONTINUED | OUTPATIENT
Start: 2022-04-13 | End: 2022-04-15 | Stop reason: HOSPADM

## 2022-04-13 RX ORDER — POTASSIUM CHLORIDE 1.5 G/1.77G
40 POWDER, FOR SOLUTION ORAL AS NEEDED
Status: DISCONTINUED | OUTPATIENT
Start: 2022-04-13 | End: 2022-04-15 | Stop reason: HOSPADM

## 2022-04-13 RX ORDER — SODIUM CHLORIDE 0.9 % (FLUSH) 0.9 %
10 SYRINGE (ML) INJECTION EVERY 12 HOURS SCHEDULED
Status: DISCONTINUED | OUTPATIENT
Start: 2022-04-13 | End: 2022-04-15 | Stop reason: HOSPADM

## 2022-04-13 RX ORDER — MIDAZOLAM HYDROCHLORIDE 2 MG/2ML
INJECTION, SOLUTION INTRAMUSCULAR; INTRAVENOUS
Status: DISPENSED
Start: 2022-04-13 | End: 2022-04-13

## 2022-04-13 RX ORDER — SODIUM CHLORIDE 0.9 % (FLUSH) 0.9 %
10 SYRINGE (ML) INJECTION AS NEEDED
Status: DISCONTINUED | OUTPATIENT
Start: 2022-04-13 | End: 2022-04-15 | Stop reason: HOSPADM

## 2022-04-13 RX ORDER — DEXTROSE MONOHYDRATE 25 G/50ML
25 INJECTION, SOLUTION INTRAVENOUS
Status: DISCONTINUED | OUTPATIENT
Start: 2022-04-13 | End: 2022-04-15 | Stop reason: HOSPADM

## 2022-04-13 RX ADMIN — AMIODARONE HYDROCHLORIDE 1 MG/MIN: 1.8 INJECTION, SOLUTION INTRAVENOUS at 09:11

## 2022-04-13 RX ADMIN — ENOXAPARIN SODIUM 40 MG: 40 INJECTION SUBCUTANEOUS at 15:25

## 2022-04-13 RX ADMIN — AMIODARONE HYDROCHLORIDE 0.5 MG/MIN: 1.8 INJECTION, SOLUTION INTRAVENOUS at 22:24

## 2022-04-13 RX ADMIN — CARVEDILOL 25 MG: 12.5 TABLET, FILM COATED ORAL at 21:22

## 2022-04-13 RX ADMIN — AMIODARONE HYDROCHLORIDE 150 MG: 1.5 INJECTION, SOLUTION INTRAVENOUS at 09:00

## 2022-04-13 RX ADMIN — Medication 10 ML: at 21:23

## 2022-04-13 RX ADMIN — ASPIRIN 81 MG CHEWABLE TABLET 81 MG: 81 TABLET CHEWABLE at 15:25

## 2022-04-13 RX ADMIN — AMIODARONE HYDROCHLORIDE 1 MG/MIN: 1.8 INJECTION, SOLUTION INTRAVENOUS at 15:21

## 2022-04-13 RX ADMIN — ATORVASTATIN CALCIUM 40 MG: 40 TABLET, FILM COATED ORAL at 15:25

## 2022-04-13 RX ADMIN — CYCLOBENZAPRINE 10 MG: 10 TABLET, FILM COATED ORAL at 15:24

## 2022-04-13 NOTE — PLAN OF CARE
Goal Outcome Evaluation:  Plan of Care Reviewed With: patient, spouse        Progress: no change  Outcome Evaluation: Pt new admit from home with A fib. had sustained V Tach in ER and runs treated with amio bolus & GTT> now with gtt infusing at33.33 pt is now NRS. EKG and troponin done.correction ( NSR)    Cardiology consulted will see in am.  BG WNL. VSS, no soa or chest pain since admit.

## 2022-04-13 NOTE — H&P
[facility] HOSPITALIST     PCP: Quinn Stoner DO    CODE status: Full    CHIEF COMPLAINT: Palpitations    HISTORY OF PRESENT ILLNESS:    Patient is a 47-year-old male with past medical history significant for type 2 diabetes mellitus not requiring insulin, cardiomyopathy, depression, anxiety, hypertension.  Patient reports that he has had chest discomfort with palpitations that started last night he describes his discomfort as mild and intermittent in the front part of his chest.  He had recently been seen by his cardiologist Dr. Woods who placed him on a Holter monitor, however due to his symptoms worsening he sought care in the [place] ED today.     In the ED he was noted to have A. fib RVR with rate as high as the 180 range, cardiology reviewed his EKG and recommended observation in the ICU.  Patient converted to normal sinus rhythm on amiodarone drip.       Past Medical History:   Diagnosis Date   • Anxiety    • Cardiomyopathy (HCC)    • Depression    • Diabetes mellitus (HCC)    • Varicose veins      Past Surgical History:   Procedure Laterality Date   • CHOLECYSTECTOMY       Family History   Problem Relation Age of Onset   • Hypertension Mother    • Atrial fibrillation Father    • Heart attack Father    • Diabetes Other      Social History     Tobacco Use   • Smoking status: Never Smoker   • Smokeless tobacco: Never Used   Substance Use Topics   • Alcohol use: No   • Drug use: No     (Not in a hospital admission)    Allergies:  Eggs or egg-derived products and Fish-derived products      There is no immunization history on file for this patient.    REVIEW OF SYSTEMS:  Please see the above history of present illness for pertinent positives and negatives.  The remainder of the patient's systems have been reviewed and are negative.     Vital Signs  Temp:  [98 °F (36.7 °C)] 98 °F (36.7 °C)  Heart Rate:  [0-180] 0  Resp:  [16] 16  BP: (107-167)/(84-98) 110/87  Flowsheet Rows    Flowsheet  "Row First Filed Value   Admission Height 182.9 cm (72\") Documented at 04/13/2022 0852   Admission Weight 127 kg (280 lb) Documented at 04/13/2022 0852             Physical Exam:  General: Patient is awake and alert.  Obese male. No acute distress noted.   HENT: Head is atraumatic, normocephalic. Hearing is grossly intact. Nose is without obvious congestion and appears patent. Neck is supple and trachea is midline.   Eyes: Vision is grossly intact. Pupils appear equal and round.   Cardiovascular: Heart has regular rate and rhythm with no murmurs, rubs or gallops noted.   Respiratory: Lungs are clear to ausculation without wheezes, rhonchi or rales.   Abdominal/GI: Soft, nontender, bowel sounds present. No rebound or guarding present.   Extremities: No peripheral edema noted.   Musculoskeletal: Spontaneous movement of bilateral upper and lower extremities against gravity noted. No signs of injury or deformity noted.   Skin: Warm and dry.   Psych: Mood and affect are appropriate. Cooperative with exam.   Neuro: No facial asymmetry noted. No focal deficits noted, hearing and vision are grossly intact.       Results Review:    I reviewed the patient's new clinical results.  Lab Results (most recent)     Procedure Component Value Units Date/Time    Comprehensive Metabolic Panel [[id number]]  (Abnormal) Collected: 04/13/22 0856    Specimen: Blood Updated: 04/13/22 0933     Glucose 175 mg/dL      BUN 20 mg/dL      Creatinine 0.94 mg/dL      Sodium 135 mmol/L      Potassium 4.8 mmol/L      Comment: Slight hemolysis detected by analyzer. Results may be affected.        Chloride 100 mmol/L      CO2 22.0 mmol/L      Calcium 9.7 mg/dL      Total Protein 7.4 g/dL      Albumin 4.20 g/dL      ALT (SGPT) 16 U/L      AST (SGOT) 15 U/L      Alkaline Phosphatase 67 U/L      Total Bilirubin 0.4 mg/dL      Globulin 3.2 gm/dL      A/G Ratio 1.3 g/dL      BUN/Creatinine Ratio 21.3     Anion Gap 13.0 mmol/L      eGFR 101.9 mL/min/1.73      " Comment: National Kidney Foundation and American Society of Nephrology (ASN) Task Force recommended calculation based on the Chronic Kidney Disease Epidemiology Collaboration (CKD-EPI) equation refit without adjustment for race.       Narrative:      GFR Normal >60  Chronic Kidney Disease <60  Kidney Failure <15      Troponin [[id number]]  (Normal) Collected: 04/13/22 0856    Specimen: Blood Updated: 04/13/22 0930     Troponin T <0.010 ng/mL     Narrative:      Troponin T Reference Range:  <= 0.03 ng/mL-   Negative for AMI  >0.03 ng/mL-     Abnormal for myocardial necrosis.  Clinicians would have to utilize clinical acumen, EKG, Troponin and serial changes to determine if it is an Acute Myocardial Infarction or myocardial injury due to an underlying chronic condition.       Results may be falsely decreased if patient taking Biotin.      CBC & Differential [[id number]]  (Abnormal) Collected: 04/13/22 0856    Specimen: Blood Updated: 04/13/22 0911    Narrative:      The following orders were created for panel order CBC & Differential.  Procedure                               Abnormality         Status                     ---------                               -----------         ------                     CBC Auto Differential[[id number]]        Abnormal            Final result                 Please view results for these tests on the individual orders.    CBC Auto Differential [[id number]]  (Abnormal) Collected: 04/13/22 0856    Specimen: Blood Updated: 04/13/22 0911     WBC 9.34 10*3/mm3      RBC 6.63 10*6/mm3      Hemoglobin 17.3 g/dL      Hematocrit 54.4 %      MCV 82.1 fL      MCH 26.1 pg      MCHC 31.8 g/dL      RDW 14.0 %      RDW-SD 41.9 fl      MPV 11.1 fL      Platelets 290 10*3/mm3      Neutrophil % 67.6 %      Lymphocyte % 21.5 %      Monocyte % 7.3 %      Eosinophil % 3.1 %      Basophil % 0.2 %      Immature Grans % 0.3 %      Neutrophils, Absolute 6.31 10*3/mm3      Lymphocytes, Absolute 2.01 10*3/mm3       Monocytes, Absolute 0.68 10*3/mm3      Eosinophils, Absolute 0.29 10*3/mm3      Basophils, Absolute 0.02 10*3/mm3      Immature Grans, Absolute 0.03 10*3/mm3           Imaging Results (Most Recent)     Procedure Component Value Units Date/Time    XR Chest 1 View [[id number]] Collected: 04/13/22 1038     Updated: 04/13/22 1041    Narrative:      AP PORTABLE CHEST, 04/13/2022     HISTORY:  Chest pain and dizziness since last night     COMPARISON:  04/07/2022     TECHNIQUE:  AP portable chest x-ray.     FINDINGS:  Heart size and pulmonary vascularity are normal. The lungs are expanded  and clear. No visible pulmonary infiltrate or pleural effusion.           Impression:      Negative single view chest     This report was finalized on 4/13/2022 10:39 AM by Dr. Dom Tarango MD.           Reviewed    ECG/EMG Results (most recent)     None        EKG performed in ED was discussed between ED provider myself, noted to have A. fib RVR alternating with SVT on monitors in ED as well as on EKG reviewed by cardiology.     Assessment/Plan     Atrial fibrillation with rapid ventricular rate  -Patient alternating between SVT and atrial fibrillation, valuated by cardiology in the ED who recommended placement in the ICU and observation  -Patient has converted to normal sinus rhythm with amiodarone infusion therefore we will continue  -We will continue home aspirin but hold diltiazem while on amiodarone.   -Cardiology consulted.   -Previous echo from 4/7/2022 shows normal EF of 57.8%, left ventricular diastolic function was normal, left ventricular wall thickness consistent with mild concentric hypertrophy.   -Continue telemetry monitoring, repeat EKGs.       Chronic stable conditions to be managed with home medications include the following conditions listed below. Also please note: Every effort was made to accurately obtain patient's home medications. This was done utilizing extensive chart review, ED documentation, recent pharmacy  records, and where possible thorough discussion with the patient if medications were known by the patient. I have reviewed and edited the patient's home medications as per my efforts above and current med list can be found within home medications portion of this electronic medical record and is accurate as possible as of 04/13/22     Depression: Hold citalopram due to potential for QT prolongation with amiodarone    Paroxysmal atrial fibrillation-treatment as per above holding home diltiazem.  Continue home aspirin.     Hyperlipidemia-continue atorvastatin    Diabetes mellitus type 2-hold home Jardiance and Amaryl will place on sliding scale insulin    Hypertension-currently hypotensive therefore holding lisinopril and spironolactone    DVT PPX: Lovenox          I discussed the patient's findings and my recommendations with patient.     Carlo Barraza DO  04/13/22  11:23 EDT    Time:38 minutes    As of April 2021, as required by the Federal 21st Century Cures Act, medical records (including provider notes and laboratory/imaging results) are to be made available to patients and/or their designees as soon as the documents are signed/resulted. While the intention is to ensure transparency and to engage patients in their healthcare, this immediate access may create unintended consequences because this document uses language intended for communication between medical providers for interpretation with the entirety of the patient's clinical picture in mind. It is recommended that patients and/or their designees review all available information with their primary or specialist providers for explanation and to avoid misinterpretation of this information.

## 2022-04-13 NOTE — ED NOTES
Call placed to [facility] cardiology. Per the rounding nurse, Dr Pavon will be paged.

## 2022-04-14 LAB
ANION GAP SERPL CALCULATED.3IONS-SCNC: 10.5 MMOL/L (ref 5–15)
BUN SERPL-MCNC: 18 MG/DL (ref 6–20)
BUN/CREAT SERPL: 21.7 (ref 7–25)
CALCIUM SPEC-SCNC: 9.3 MG/DL (ref 8.6–10.5)
CHLORIDE SERPL-SCNC: 100 MMOL/L (ref 98–107)
CO2 SERPL-SCNC: 23.5 MMOL/L (ref 22–29)
CREAT SERPL-MCNC: 0.83 MG/DL (ref 0.76–1.27)
EGFRCR SERPLBLD CKD-EPI 2021: 110 ML/MIN/1.73
GLUCOSE BLDC GLUCOMTR-MCNC: 100 MG/DL (ref 70–130)
GLUCOSE BLDC GLUCOMTR-MCNC: 139 MG/DL (ref 70–130)
GLUCOSE BLDC GLUCOMTR-MCNC: 157 MG/DL (ref 70–130)
GLUCOSE SERPL-MCNC: 142 MG/DL (ref 65–99)
POTASSIUM SERPL-SCNC: 4.4 MMOL/L (ref 3.5–5.2)
SODIUM SERPL-SCNC: 134 MMOL/L (ref 136–145)

## 2022-04-14 PROCEDURE — 80048 BASIC METABOLIC PNL TOTAL CA: CPT | Performed by: INTERNAL MEDICINE

## 2022-04-14 PROCEDURE — G0378 HOSPITAL OBSERVATION PER HR: HCPCS

## 2022-04-14 PROCEDURE — 63710000001 INSULIN ASPART PER 5 UNITS: Performed by: INTERNAL MEDICINE

## 2022-04-14 PROCEDURE — 96366 THER/PROPH/DIAG IV INF ADDON: CPT

## 2022-04-14 PROCEDURE — 94761 N-INVAS EAR/PLS OXIMETRY MLT: CPT

## 2022-04-14 PROCEDURE — 99214 OFFICE O/P EST MOD 30 MIN: CPT | Performed by: NURSE PRACTITIONER

## 2022-04-14 PROCEDURE — 99226 PR SBSQ OBSERVATION CARE/DAY 35 MINUTES: CPT | Performed by: INTERNAL MEDICINE

## 2022-04-14 PROCEDURE — 94799 UNLISTED PULMONARY SVC/PX: CPT

## 2022-04-14 PROCEDURE — 82962 GLUCOSE BLOOD TEST: CPT

## 2022-04-14 RX ORDER — ACETAMINOPHEN 325 MG/1
650 TABLET ORAL EVERY 6 HOURS PRN
Status: DISCONTINUED | OUTPATIENT
Start: 2022-04-14 | End: 2022-04-15 | Stop reason: HOSPADM

## 2022-04-14 RX ORDER — DILTIAZEM HYDROCHLORIDE 120 MG/1
240 CAPSULE, COATED, EXTENDED RELEASE ORAL
Status: DISCONTINUED | OUTPATIENT
Start: 2022-04-14 | End: 2022-04-15 | Stop reason: HOSPADM

## 2022-04-14 RX ADMIN — DILTIAZEM HYDROCHLORIDE 240 MG: 120 CAPSULE, COATED, EXTENDED RELEASE ORAL at 11:10

## 2022-04-14 RX ADMIN — APIXABAN 5 MG: 2.5 TABLET, FILM COATED ORAL at 20:27

## 2022-04-14 RX ADMIN — CARVEDILOL 25 MG: 12.5 TABLET, FILM COATED ORAL at 08:10

## 2022-04-14 RX ADMIN — Medication 10 ML: at 20:28

## 2022-04-14 RX ADMIN — APIXABAN 5 MG: 2.5 TABLET, FILM COATED ORAL at 12:34

## 2022-04-14 RX ADMIN — METOPROLOL TARTRATE 12.5 MG: 25 TABLET, FILM COATED ORAL at 20:27

## 2022-04-14 RX ADMIN — ASPIRIN 81 MG CHEWABLE TABLET 81 MG: 81 TABLET CHEWABLE at 08:09

## 2022-04-14 RX ADMIN — ACETAMINOPHEN 650 MG: 325 TABLET ORAL at 06:54

## 2022-04-14 RX ADMIN — INSULIN ASPART 2 UNITS: 100 INJECTION, SOLUTION INTRAVENOUS; SUBCUTANEOUS at 11:33

## 2022-04-14 RX ADMIN — ACETAMINOPHEN 650 MG: 325 TABLET ORAL at 23:40

## 2022-04-14 RX ADMIN — METOPROLOL TARTRATE 12.5 MG: 25 TABLET, FILM COATED ORAL at 12:34

## 2022-04-14 RX ADMIN — ATORVASTATIN CALCIUM 40 MG: 40 TABLET, FILM COATED ORAL at 08:10

## 2022-04-14 NOTE — CONSULTS
Patient Name: Santiago Zamora  :1975  47 y.o.    Date of Admission: 2022  Date of Consultation:  22  Encounter Provider:  KIAN Luque  Place of Service: [facility] CARDIOLOGY  Referring Provider: No ref. provider found  Patient Care Team:  Quinn Stoner DO as PCP - General (Family Medicine)  Quinn Stoner DO      Chief complaint: afib    History of Present Illness:    This is a 47-year-old obese male with past medical history of diabetes mellitus, hypertension, hyperlipidemia, anxiety, remote history of CHF with reported prior ejection fraction of 10%, atrial fibrillation.      He presented to the emergency department on 2022 with complaints of heart fluttering associated with lightheadedness and short windedness.  On arrival, his heart rate was 102 but then did elevate to 172 with a blood pressure initially 177/100, saturation 98% on room air.  He was placed on a diltiazem drip and converted back to NSR overnight.  He has had 2 - troponins with a normal proBNP, blood glucose was 273 with normal creatinine otherwise normal CMP, hemoglobin A1c 11.8, normal TSH, normal magnesium, normal INR, unremarkable CBC.  Initial EKG showed atrial fibrillation with left anterior fascicular block. After converting back to ST, EKG showed sinus rhythm with an incomplete left bundle branch block. Echo showed normal EF of 57.8% with normal left ventricular diastolic function, left ventricular wall thickness consistent with mild concentric hypertrophy. He had several medication changes during his brief stay.  He was discharged home on Zio patch.       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  He works about 60 to 70 hours a week.  When he had his cardiomyopathy 15 years ago, he was drinking heavily and has not drank anything in 15 years.  He uses no tobacco or illicit drugs.  He used to weigh 400 pounds and has lost just over 100  pounds.  He is known to have sleep apnea but cannot tolerate CPAP mask.     He presented 4/13/2022 to the ED with complaints of chest discomfort and palpitations.He was in atrial fibrillation with intermittent Vtach with a HR of 148 BPM. Labs revealed magnesium 2.2.  His CBC revealed RBC 6.63 with an H&H of 17.3/54.4.  Troponin negative x2.  CMP with a glucose of 175, creatinine 0.94, sodium 135 and potassium 4.8.  Otherwise unremarkable.  This morning's BMP shows glucose 142, creatinine 0.83, sodium 134 and potassium 4.4.  Covid test negative.  Chest x-ray performed shows heart size and pulmonary vascularity are normal.  The lungs are expanded and clear, no visible pulmonary infiltrate or pleural effusion.    Dr. Woods was contacted and she ordered for him to be put on an amiodarone drip.  He was then admitted to the ICU.  He converted to normal sinus rhythm on the amiodarone drip.    He is comfortably resting in bed family at bedside.  He states he can tell when he went in to atrial fibrillation because it causes shortness of breath and chest discomfort.  He remains in sinus rhythm rate controlled on the amiodarone drip.  I discussed medication therapy and the need for anticoagulation.  He has wife voiced understanding.  He now understands the importance of making sure his sleep apnea is treated.      Past Medical History:   Diagnosis Date   • Anxiety    • Cardiomyopathy (HCC)    • Depression    • Diabetes mellitus (HCC)    • Elevated cholesterol    • Hyperlipidemia    • Varicose veins        Past Surgical History:   Procedure Laterality Date   • CHOLECYSTECTOMY           Prior to Admission medications    Medication Sig Start Date End Date Taking? Authorizing Provider   1st Choice Lancets Super Thin misc USE TO TEST GLUCOSE DAILY 3/25/20   Quinn Stoner, DO   aspirin 81 MG chewable tablet Chew. 5/1/14   Provider, MD Tino   atorvastatin (LIPITOR) 40 MG tablet Take 1 tablet by mouth Daily. 4/9/22    Yola Guerrero APRN   carvedilol (COREG) 25 MG tablet Take 1 tablet by mouth 2 (Two) Times a Day. 3/31/22   Quinn Stoner DO   citalopram (CeleXA) 40 MG tablet Take 1 tablet by mouth Daily. 3/31/22   Quinn Stoner DO   cyclobenzaprine (FLEXERIL) 10 MG tablet Take 1 tablet by mouth 3 (Three) Times a Day As Needed for Muscle Spasms. 1/19/22   Quinn Stoner DO   dilTIAZem CD (CARDIZEM CD) 180 MG 24 hr capsule Take 1 capsule by mouth Every Night. 4/8/22   Yola Guerrero APRN   empagliflozin (Jardiance) 25 MG tablet tablet Take 1 tablet by mouth Daily. 4/8/22   Yola Guerrero APRN   glimepiride (Amaryl) 4 MG tablet Take 1 tablet by mouth Every Morning Before Breakfast. 3/31/22   Quinn Stoner DO   glucose blood (Accu-Chek Rebecca) test strip Test blood sugar once daily as instructed 3/25/20   Quinn Stoner DO   glucose monitor monitoring kit Use accu-chek Rebecca glucometer to check your blood sugar once daily 3/25/20   Quinn Stoner DO   lisinopril (PRINIVIL,ZESTRIL) 20 MG tablet Take 1 tablet by mouth Daily. 3/31/22   Quinn Stoner DO   spironolactone (ALDACTONE) 25 MG tablet Take One-Half tablet (12.5 mg) by mouth Daily. 4/9/22   Yola Guerrero APRN       Allergies   Allergen Reactions   • Eggs Or Egg-Derived Products Swelling   • Fish-Derived Products Swelling       Social History     Socioeconomic History   • Marital status:    Tobacco Use   • Smoking status: Never Smoker   • Smokeless tobacco: Never Used   Substance and Sexual Activity   • Alcohol use: No   • Drug use: No   • Sexual activity: Defer       Family History   Problem Relation Age of Onset   • Hypertension Mother    • Hypertension Father    • Atrial fibrillation Father    • Heart attack Father    • Diabetes Other        REVIEW OF SYSTEMS:   All systems reviewed.  Pertinent positives identified in HPI.  All other systems are negative.      Objective:     Vitals:    04/14/22 0400 04/14/22 0500 04/14/22 0600  04/14/22 0701   BP: 129/81 123/80 138/85 105/72   BP Location:  Right arm Right arm    Patient Position:  Lying Lying    Pulse: 63 62 69 61   Resp: 14 16 18    Temp:       TempSrc:       SpO2: 97% 95% 99% 99%   Weight:       Height:         Body mass index is 37.97 kg/m².    General Appearance:    Alert, cooperative, in no acute distress   Head:    Normocephalic, without obvious abnormality, atraumatic   Eyes:            Lids and lashes normal, conjunctivae and sclerae normal   Ears:    Ears appear intact with no abnormalities noted   Throat:    oral mucosa moist   Neck:   No adenopathy, supple, trachea midline,no carotid bruit, no JVD   Back:      range of motion normal   Lungs:     Clear to auscultation, respirations regular, even and unlabored    Heart:    Regular rhythm and normal rate, normal S1 and S2, no murmur, no gallop, no rub, no click   Chest Wall:    No abnormalities observed   Abdomen:     Normal bowel sounds,  soft, nontender, nondistended, no guarding, no rebound  tenderness   Extremities:   Moves all extremities well, no edema, no cyanosis, no redness   Pulses:   Pulses palpable and equal bilaterally. Normal radial, carotid,  dorsalis pedis and posterior tibial pulses bilaterally. Normal abdominal aorta   Skin:  Psychiatric:   No bleeding, bruising or rash    Alert and oriented x 3, normal mood and affect   Lab Review:     Results from last 7 days   Lab Units 04/14/22  0610 04/13/22 1628 04/13/22  0856   SODIUM mmol/L 134*   < > 135*   POTASSIUM mmol/L 4.4   < > 4.8   CHLORIDE mmol/L 100   < > 100   CO2 mmol/L 23.5   < > 22.0   BUN mg/dL 18   < > 20   CREATININE mg/dL 0.83   < > 0.94   CALCIUM mg/dL 9.3   < > 9.7   BILIRUBIN mg/dL  --   --  0.4   ALK PHOS U/L  --   --  67   ALT (SGPT) U/L  --   --  16   AST (SGOT) U/L  --   --  15   GLUCOSE mg/dL 142*   < > 175*    < > = values in this interval not displayed.     Results from last 7 days   Lab Units 04/13/22  1628 04/13/22  0856 04/07/22  3816    TROPONIN T ng/mL <0.010 <0.010 <0.010     Results from last 7 days   Lab Units 04/13/22  0856   WBC 10*3/mm3 9.34   HEMOGLOBIN g/dL 17.3   HEMATOCRIT % 54.4*   PLATELETS 10*3/mm3 290     Results from last 7 days   Lab Units 04/07/22  1817   INR  0.90   APTT seconds 27.7     Results from last 7 days   Lab Units 04/13/22  0856   MAGNESIUM mg/dL 2.2                   I personally viewed and interpreted the patient's EKG/Telemetry data.      Atrial fibrillation with episodic Vtach          Sinus rythym      Assessment and Plan:       1.  Atrial fibrillation - CHADS2-VASc is 2.  He was started on amiodarone drip and converted back to NSR.  Will stop amiodarone drip  and restart diltiazem at 240 mg daily and add metoprolol tartrate 12.5 mg BID.  Start OAC with Eliquis 5 mg BID  2. Episodic Vtach - nonsustained.   3.  HTN - well controlled  4. DM - per primry  5,  Hyperlipidemia - continue statin therapy  6.  History of cardiomyopathy - resolved, EF now 57% from echo 4/8/2022  7.  Obesity - he used to weight ~400 lbs but has lost over 100 pounds  8.  VIDHYA - untreated, could not tolerate the CPAP.  He has been referred back to sleep medicine as he may be a candidate for Inspire    Stop amiodarone drip, restart diltiazem at 240 mg daily.  Add low dose BB and Start OAC with Eliquis 5 mg BID.  Monitor blood pressure and heart rate closely.    Discussed with Dr. Juarez and Dr. Barraza.    Sleep apnea must be treated.   Long discussion with patient and family.    Neftaly Millard, APRN  04/14/22  08:16 EDT

## 2022-04-14 NOTE — PLAN OF CARE
Problem: Adult Inpatient Plan of Care  Goal: Plan of Care Review  Outcome: Ongoing, Progressing  Flowsheets (Taken 4/13/2022 2228)  Progress: improving  Plan of Care Reviewed With: patient  Goal: Absence of Hospital-Acquired Illness or Injury  Outcome: Ongoing, Progressing  Intervention: Identify and Manage Fall Risk  Recent Flowsheet Documentation  Taken 4/13/2022 2100 by Ashleigh Mchugh RN  Safety Promotion/Fall Prevention:   safety round/check completed   room organization consistent   nonskid shoes/slippers when out of bed   lighting adjusted   clutter free environment maintained  Taken 4/13/2022 2000 by Ashleigh Mchugh RN  Safety Promotion/Fall Prevention:   safety round/check completed   room organization consistent   nonskid shoes/slippers when out of bed   lighting adjusted   clutter free environment maintained  Taken 4/13/2022 1901 by Ashleigh Mchugh RN  Safety Promotion/Fall Prevention:   safety round/check completed   room organization consistent   nonskid shoes/slippers when out of bed   lighting adjusted   clutter free environment maintained  Intervention: Prevent Skin Injury  Recent Flowsheet Documentation  Taken 4/13/2022 2100 by Ashleigh Mchugh RN  Body Position:   position changed independently   tilted   right  Taken 4/13/2022 2000 by Ashleigh Mchugh RN  Body Position:   position changed independently   supine  Taken 4/13/2022 1901 by Ashleigh Mchugh RN  Body Position:   position changed independently   tilted   left  Intervention: Prevent and Manage VTE (Venous Thromboembolism) Risk  Recent Flowsheet Documentation  Taken 4/13/2022 1901 by Ashleigh Mchugh RN  Activity Management: dorsiflexion/plantar flexion performed  VTE Prevention/Management: dorsiflexion/plantar flexion performed  Range of Motion: ROM (range of motion) performed  Intervention: Prevent Infection  Recent Flowsheet Documentation  Taken 4/13/2022 1901 by Ashleigh Mchugh RN  Infection Prevention:   hand hygiene promoted   rest/sleep promoted    single patient room provided  Goal: Optimal Comfort and Wellbeing  Outcome: Ongoing, Progressing  Intervention: Provide Person-Centered Care  Recent Flowsheet Documentation  Taken 4/13/2022 1901 by Ashleigh Mchugh, RN  Trust Relationship/Rapport:   care explained   choices provided   emotional support provided   empathic listening provided   questions answered   questions encouraged   reassurance provided   thoughts/feelings acknowledged  Goal: Readiness for Transition of Care  Outcome: Ongoing, Progressing   Goal Outcome Evaluation:  Plan of Care Reviewed With: patient        Progress: improving

## 2022-04-14 NOTE — CASE MANAGEMENT/SOCIAL WORK
Discharge Planning Assessment  GABRIEL Haley     Patient Name: Santiago Zamora  MRN: [id number]  Today's Date: 4/14/2022    Admit Date: 4/13/2022     Discharge Needs Assessment     Row Name 04/14/22 1133       Living Environment    People in Home spouse;other relative(s)    Name(s) of People in Home Suzy Zamora and in laws    Current Living Arrangements home  single story house with no steps to gain entry    Duration at Residence 10 years    Potentially Unsafe Housing Conditions --  none    Primary Care Provided by self    Provides Primary Care For no one    Caregiving Concerns no care giving concerns voiced by patient at this time.    Family Caregiver if Needed spouse    Family Caregiver Names Suzy, wife    Quality of Family Relationships helpful;involved;supportive    Able to Return to Prior Arrangements yes    Living Arrangement Comments Patient states he lives with his wife in a single story house with no steps to gain entry       Resource/Environmental Concerns    Resource/Environmental Concerns none    Transportation Concerns none       Transition Planning    Patient/Family Anticipates Transition to home with family    Patient/Family Anticipated Services at Transition none    Transportation Anticipated family or friend will provide  pt states that his wife will be able to provide home at discharge       Discharge Needs Assessment    Readmission Within the Last 30 Days no previous admission in last 30 days    Current Outpatient/Agency/Support Group --  none    Equipment Currently Used at Home none    Concerns to be Addressed denies needs/concerns at this time;discharge planning    Concerns Comments no discharge needs voiced by patient at this time.    Anticipated Changes Related to Illness none    Equipment Needed After Discharge none    Outpatient/Agency/Support Group Needs --  pt states he will not need these services at discharge    Discharge Facility/Level of Care Needs --  pt states that he will not need  these services at discharge    Provided Post Acute Provider List? Refused    Refused Provider List Comment offered community resources but patient declines the need for them at this rina.e    Patient's Choice of Community Agency(s) none    Current Discharge Risk --  none    Discharge Coordination/Progress Patient states that he plans on returning home at discharge with  his wife to help as needed, no discharge needs voiced by patient at this time.               Discharge Plan     Row Name 04/14/22 1145       Plan    Plan Home with wife    Patient/Family in Agreement with Plan yes    Plan Comments Into room and introduced self and role of CM. Discussed discharge disposition with patient and his wife Suzy with permission. Patient is currently resting in bed with no complaints. Patient confirms that the info on his face sheet is correct and that he see's Dr. Quinn Stoner as PCP. He states that he uses [facility] in [place] and has no problem picking up or paying for his meds. He also states that he does not have a living will and declines information regarding one. Patient states he lives wit his wife in a single story house with no steps to gain entry and has no problem maneuvering in or out or within the home. He states that he is independent with his ADL's, works and drives and that his wife will be able to provide ride home at discharge. He also states that he does not currently use any DME at home and does not anticipate needing any equipment at discharge. Patient states he has not used home health in the past and states he will not need this service at discharge. CM offered community resources such as home health, STR and LTC but patient declines the need for them at this time. Patient states he plans on returning home at discharge with  his wife to help if needed, no discharge needs voiced by patient at this time. Patient and wife had no other questions or concerns regarding discharge plans. Name and  phone placed on white board in room. CM will continue to follow for needs.              Continued Care and Services - Admitted Since 4/13/2022    Coordination has not been started for this encounter.       Expected Discharge Date and Time     Expected Discharge Date Expected Discharge Time    Apr 15, 2022          Demographic Summary     Row Name 04/14/22 1132       General Information    Admission Type observation    Arrived From home    Referral Source admission list    Reason for Consult discharge planning    Preferred Language English       Contact Information    Permission Granted to Share Info With                Functional Status    No documentation.                Psychosocial    No documentation.                Abuse/Neglect    No documentation.                Legal    No documentation.                Substance Abuse    No documentation.                Patient Forms    No documentation.                   Aniyah Glez, PARAM

## 2022-04-14 NOTE — PROGRESS NOTES
Adult Nutrition  Assessment/PES    Patient Name:  Santiago Zamora  YOB: 1975  MRN: [id number]  Admit Date:  4/13/2022    Assessment Date:  4/14/2022    Comments:  Pt tolerating diet. Pt deferred additional diet education (see below):     Reason for Assessment     Row Name 04/14/22 1429          Reason for Assessment    Reason For Assessment identified at risk by screening criteria     Diagnosis --  a.fib with RVA, history of:  diabetes, CHF and obesity     Identified At Risk by Screening Criteria MST SCORE 2+                Nutrition/Diet History     Row Name 04/14/22 1430          Nutrition/Diet History    Typical Intake (Food/Fluid/EN/PN) has recently made changes to eating habits to improve health (avoids fried/fast foods, eats more grilled foods/salads)                Anthropometrics     Row Name 04/14/22 1432          Anthropometrics    Age for Calculations 47     Weight for Calculation 127 kg (279 lb 15.8 oz)                Labs/Tests/Procedures/Meds     Row Name 04/14/22 1432          Labs/Procedures/Meds    Lab Results Reviewed reviewed     Lab Results Comments [id number]=51.8            Medications    Pertinent Medications Reviewed reviewed                  Estimated/Assessed Needs - Anthropometrics     Row Name 04/14/22 1432          Anthropometrics    Age for Calculations 47     Weight for Calculation 127 kg (279 lb 15.8 oz)            Estimated/Assessed Needs    Additional Documentation [place]-St. Jeor Equation (Group)            [place]-St. Jeor Equation    RMR ([place]-St. Jeor Equation) 2193     [place]-St. Jeor Activity Factors --  no activity factor     [place]-St. Jeor (Considerations) 219  estimated carbohydrate needs=219 grams based upon 40% of estimated energy needs                Nutrition Prescription Ordered     Row Name 04/14/22 1434          Nutrition Prescription PO    Common Modifiers Consistent Carbohydrate                Evaluation of Received Nutrient/Fluid Intake     Row  Name 04/14/22 1437          Fluid Intake Evaluation    Oral Fluid (mL) --  insufficient data            PO Evaluation    Number of Meals 2     % PO Intake 50%                     Problem/Interventions:   Problem 1     Row Name 04/14/22 1440          Nutrition Diagnoses Problem 1    Problem 1 Altered Nutrition Related to Labs     Etiology (related to) Medical Diagnosis     Signs/Symptoms (evidenced by) Biochemical     Specific Labs Noted [id number]                Problem 2     Row Name 04/14/22 1441          Nutrition Diagnoses Problem 2    Problem 2 Overweight/Obesity     Etiology (related to) Factors Affecting Nutrition     Signs/Symptoms (evidenced by) BMI                    Intervention Goal     Row Name 04/14/22 1442          Intervention Goal    PO Establish PO;PO intake (%)     PO Intake % 50 %  or greater of meals                Nutrition Intervention     Row Name 04/14/22 1442          Nutrition Intervention    RD/Tech Action Interview for preference;Encourage intake;Follow Tx progress                  Education/Evaluation     Row Name 04/14/22 1442          Education    Education Previous education by RD/CHRISTIAN;Other (comment)  pt educated here on 4.8.22 and deferred additional education            Monitor/Evaluation    Monitor I&O;PO intake;Pertinent labs;Weight;Skin status                 Electronically signed by:  Celina Frazier RD  04/14/22 14:43 EDT

## 2022-04-14 NOTE — PROGRESS NOTES
"SERVICE: [facility] HOSPITALIST    CONSULTANTS: Cardiology     CHIEF COMPLAINT: palpitations     SUBJECTIVE: Patient seen and examined at bedside. Patient reports no acute issues since arrival. He denies additional palpitations. Nursing reports he's been in NSR since arrival. Patient denies headache, fever or chills, nausea, vomiting, diarrhea, abdominal pain, chest pain or palpitations, shortness of breath, wheezing or coughing, leg pain or weakness.     OBJECTIVE:    Physical exam is largely unchanged from previous exam, except where documented below, examination is accurate as of 4/14/2022    Physical Exam:  General: Patient is awake and alert. Obese male. No acute distress noted.   HENT: Head is atraumatic, normocephalic. Hearing is grossly intact. Nose is without obvious congestion and appears patent. Neck is supple and trachea is midline.   Eyes: Vision is grossly intact. Pupils appear equal and round.   Cardiovascular: Heart has regular rate and rhythm with no murmurs, rubs or gallops noted.   Respiratory: Lungs are clear to ausculation without wheezes, rhonchi or rales.   Abdominal/GI: Soft, nontender, bowel sounds present. No rebound or guarding present.   Extremities: No peripheral edema noted.   Musculoskeletal: Spontaneous movement of bilateral upper and lower extremities against gravity noted. No signs of injury or deformity noted.   Skin: Warm and dry.   Psych: Mood and affect are appropriate. Cooperative with exam.   Neuro: No facial asymmetry noted. No focal deficits noted, hearing and vision are grossly intact.     /85 (BP Location: Right arm, Patient Position: Lying)   Pulse 69   Temp 97.9 °F (36.6 °C) (Oral)   Resp 18   Ht 182.9 cm (72\")   Wt 127 kg (280 lb)   SpO2 99%   BMI 37.97 kg/m²     MEDS/LABS REVIEWED AND ORDERED    aspirin, 81 mg, Oral, Daily  atorvastatin, 40 mg, Oral, Daily  carvedilol, 25 mg, Oral, BID  enoxaparin, 40 mg, Subcutaneous, Q24H  insulin " aspart, 0-9 Units, Subcutaneous, TID AC  sodium chloride, 10 mL, Intravenous, Q12H          LAB/DIAGNOSTICS:    Lab Results (last 24 hours)     Procedure Component Value Units Date/Time    Basic Metabolic Panel [[id number]]  (Abnormal) Collected: 04/14/22 0610    Specimen: Blood from Arm, Left Updated: 04/14/22 0636     Glucose 142 mg/dL      BUN 18 mg/dL      Creatinine 0.83 mg/dL      Sodium 134 mmol/L      Potassium 4.4 mmol/L      Chloride 100 mmol/L      CO2 23.5 mmol/L      Calcium 9.3 mg/dL      BUN/Creatinine Ratio 21.7     Anion Gap 10.5 mmol/L      eGFR 110.0 mL/min/1.73      Comment: National Kidney Foundation and American Society of Nephrology (ASN) Task Force recommended calculation based on the Chronic Kidney Disease Epidemiology Collaboration (CKD-EPI) equation refit without adjustment for race.       Narrative:      GFR Normal >60  Chronic Kidney Disease <60  Kidney Failure <15      POC Glucose Once [[id number]]  (Normal) Collected: 04/13/22 1705    Specimen: Blood Updated: 04/13/22 1712     Glucose 98 mg/dL      Comment: Meter: [id number] : [id number] [facility]       Basic Metabolic Panel [[id number]]  (Abnormal) Collected: 04/13/22 1628    Specimen: Blood Updated: 04/13/22 1657     Glucose 214 mg/dL      BUN 17 mg/dL      Creatinine 0.74 mg/dL      Sodium 137 mmol/L      Potassium 3.9 mmol/L      Comment: Slight hemolysis detected by analyzer. Results may be affected.        Chloride 101 mmol/L      CO2 21.1 mmol/L      Calcium 8.5 mg/dL      BUN/Creatinine Ratio 23.0     Anion Gap 14.9 mmol/L      eGFR 113.9 mL/min/1.73      Comment: National Kidney Foundation and American Society of Nephrology (ASN) Task Force recommended calculation based on the Chronic Kidney Disease Epidemiology Collaboration (CKD-EPI) equation refit without adjustment for race.       Narrative:      GFR Normal >60  Chronic Kidney Disease <60  Kidney Failure <15      Troponin [[id number]]  (Normal) Collected: 04/13/22  1628    Specimen: Blood Updated: 04/13/22 1656     Troponin T <0.010 ng/mL     Narrative:      Troponin T Reference Range:  <= 0.03 ng/mL-   Negative for AMI  >0.03 ng/mL-     Abnormal for myocardial necrosis.  Clinicians would have to utilize clinical acumen, EKG, Troponin and serial changes to determine if it is an Acute Myocardial Infarction or myocardial injury due to an underlying chronic condition.       Results may be falsely decreased if patient taking Biotin.      COVID PRE-OP / PRE-PROCEDURE SCREENING ORDER (NO ISOLATION) - Swab, Nasal Cavity [[id number]] Collected: 04/13/22 1223    Specimen: Swab from Nasal Cavity Updated: 04/13/22 1317    Narrative:      The following orders were created for panel order COVID PRE-OP / PRE-PROCEDURE SCREENING ORDER (NO ISOLATION) - Swab, Nasal Cavity.  Procedure                               Abnormality         Status                     ---------                               -----------         ------                     COVID-19,[facility] IN-CHASE...[[id number]]                      Final result                 Please view results for these tests on the individual orders.    COVID-19,[facility] IN-HOUSE,Nasal Swab No Transport Media 3-4 HR TAT - Swab, Nasal Cavity [[id number]] Collected: 04/13/22 1223    Specimen: Swab from Nasal Cavity Updated: 04/13/22 1317     COVID19 Not Detected     Lot Number [id number]     Expiration Date 05/12/2023    Narrative:      Fact sheet for providers: https://www.fda.gov/media/147525/download     Fact sheet for patients: https://www.fda.gov/media/025106/download    Test performed by PCR.    Consider negative results in combination with clinical observations, patient history, and epidemiological information.    Magnesium [[id number]]  (Normal) Collected: 04/13/22 0856    Specimen: Blood Updated: 04/13/22 1154     Magnesium 2.2 mg/dL     Comprehensive Metabolic Panel [[id number]]  (Abnormal) Collected: 04/13/22 0856    Specimen: Blood Updated:  04/13/22 0933     Glucose 175 mg/dL      BUN 20 mg/dL      Creatinine 0.94 mg/dL      Sodium 135 mmol/L      Potassium 4.8 mmol/L      Comment: Slight hemolysis detected by analyzer. Results may be affected.        Chloride 100 mmol/L      CO2 22.0 mmol/L      Calcium 9.7 mg/dL      Total Protein 7.4 g/dL      Albumin 4.20 g/dL      ALT (SGPT) 16 U/L      AST (SGOT) 15 U/L      Alkaline Phosphatase 67 U/L      Total Bilirubin 0.4 mg/dL      Globulin 3.2 gm/dL      A/G Ratio 1.3 g/dL      BUN/Creatinine Ratio 21.3     Anion Gap 13.0 mmol/L      eGFR 101.9 mL/min/1.73      Comment: National Kidney Foundation and American Society of Nephrology (ASN) Task Force recommended calculation based on the Chronic Kidney Disease Epidemiology Collaboration (CKD-EPI) equation refit without adjustment for race.       Narrative:      GFR Normal >60  Chronic Kidney Disease <60  Kidney Failure <15      Troponin [[id number]]  (Normal) Collected: 04/13/22 0856    Specimen: Blood Updated: 04/13/22 0930     Troponin T <0.010 ng/mL     Narrative:      Troponin T Reference Range:  <= 0.03 ng/mL-   Negative for AMI  >0.03 ng/mL-     Abnormal for myocardial necrosis.  Clinicians would have to utilize clinical acumen, EKG, Troponin and serial changes to determine if it is an Acute Myocardial Infarction or myocardial injury due to an underlying chronic condition.       Results may be falsely decreased if patient taking Biotin.      CBC & Differential [[id number]]  (Abnormal) Collected: 04/13/22 0856    Specimen: Blood Updated: 04/13/22 0911    Narrative:      The following orders were created for panel order CBC & Differential.  Procedure                               Abnormality         Status                     ---------                               -----------         ------                     CBC Auto Differential[[id number]]        Abnormal            Final result                 Please view results for these tests on the individual  orders.    CBC Auto Differential [[id number]]  (Abnormal) Collected: 04/13/22 0856    Specimen: Blood Updated: 04/13/22 0911     WBC 9.34 10*3/mm3      RBC 6.63 10*6/mm3      Hemoglobin 17.3 g/dL      Hematocrit 54.4 %      MCV 82.1 fL      MCH 26.1 pg      MCHC 31.8 g/dL      RDW 14.0 %      RDW-SD 41.9 fl      MPV 11.1 fL      Platelets 290 10*3/mm3      Neutrophil % 67.6 %      Lymphocyte % 21.5 %      Monocyte % 7.3 %      Eosinophil % 3.1 %      Basophil % 0.2 %      Immature Grans % 0.3 %      Neutrophils, Absolute 6.31 10*3/mm3      Lymphocytes, Absolute 2.01 10*3/mm3      Monocytes, Absolute 0.68 10*3/mm3      Eosinophils, Absolute 0.29 10*3/mm3      Basophils, Absolute 0.02 10*3/mm3      Immature Grans, Absolute 0.03 10*3/mm3         ECG 12 Lead   Final Result   HEART RATE= 77  bpm   RR Interval= 780  ms   RI Interval= 165  ms   P Horizontal Axis= 1  deg   P Front Axis= 31  deg   QRSD Interval= 119  ms   QT Interval= 386  ms   QRS Axis= -28  deg   T Wave Axis= 143  deg   - ABNORMAL ECG -   Sinus rhythm   Incomplete left bundle branch block   No change from prior tracing   Electronically Signed By: Prakash Camarillo ([facility]) 13-Apr-2022 23:05:36   Date and Time of Study: 2022-04-13 21:29:16      ECG 12 Lead   Final Result   HEART RATE= 81  bpm   RR Interval= 736  ms   RI Interval= 166  ms   P Horizontal Axis= 12  deg   P Front Axis= 38  deg   QRSD Interval= 114  ms   QT Interval= 372  ms   QRS Axis= -27  deg   T Wave Axis= 4  deg   - ABNORMAL ECG -   Sinus rhythm   Incomplete left bundle branch block   SINCE PREVIOUS TRACING,  NONSUSTAINED VT IS NEW   Electronically Signed By: Prakash Camarillo ([facility]) 13-Apr-2022 23:05:44   Date and Time of Study: 2022-04-13 14:35:25      ECG 12 Lead   Final Result   HEART RATE= 148  bpm   RR Interval= 404  ms   RI Interval=   ms   P Horizontal Axis=   deg   P Front Axis=   deg   QRSD Interval= 106  ms   QT Interval= 301  ms   QRS Axis= -28  deg   T Wave Axis= 132  deg   -  ABNORMAL ECG -   Atrial fibrillation   Ventricular tachycardia, nonsustained    SINCE PREVIOUS TRACING,  VT IS NEW   Electronically Signed By: Prakash Camarillo ([facility]) 13-Apr-2022 23:05:50   Date and Time of Study: 2022-04-13 08:47:47        Results for orders placed during the hospital encounter of 04/07/22    Adult Transthoracic Echo Complete w/ Color, Spectral and Contrast if necessary per protocol    Interpretation Summary  · Left ventricular wall thickness is consistent with mild concentric hypertrophy.  · Calculated left ventricular EF = 57.8% Estimated left ventricular EF was in agreement with the calculated left ventricular EF. Left ventricular systolic function is normal.  · Estimated right ventricular systolic pressure from tricuspid regurgitation is normal (<35 mmHg).  · Left ventricular diastolic function was normal.    XR Chest 1 View    Result Date: 4/13/2022  Negative single view chest  This report was finalized on 4/13/2022 10:39 AM by Dr. Dom Tarango MD.          ASSESSMENT/PLAN:  Please not portions of this assessment/plan may have been copied and pasted, but I have personally seen this patient and reviewed each line of this assessment and plan for accuracy and made updates to reflect my necessary changes on 4/14/2022     A. fib with RVR  -Heart rate remains controlled on IV amiodarone, continued patient's Coreg but placed diltiazem on hold due to mild hypotension.   -Likely could resume diltiazem as well soon as blood pressure has improved but will defer to cardiology  -Cardiology following-await input on oral amiodarone and need for anticoagulation.     Chronic conditions reviewed today include:    Depression: Hold citalopram due to potential for QT prolongation with amiodarone     Paroxysmal atrial fibrillation-treatment as per above holding home diltiazem.  Continue home aspirin.      Hyperlipidemia-continue atorvastatin     Diabetes mellitus type 2-hold home Jardiance and Amaryl will place on  "sliding scale insulin     Hypertension-currently hypotensive therefore holding lisinopril and spironolactone     DVT PPX: Lovenox      PLAN FOR DISPOSITION: Likely home soon     Carlo Barraza DO  Hospitalist, [facility]  04/14/22  06:44 EDT    As of April 2021, as required by the Federal 21st AdExtent Cures Act, medical records (including provider notes and laboratory/imaging results) are to be made available to patients and/or their designees as soon as the documents are signed/resulted. While the intention is to ensure transparency and to engage patients in their healthcare, this immediate access may create unintended consequences because this document uses language intended for communication between medical providers for interpretation with the entirety of the patient's clinical picture in mind. It is recommended that patients and/or their designees review all available information with their primary or specialist providers for explanation and to avoid misinterpretation of this information.    \"Dictated utilizing Dragon dictation\"    "

## 2022-04-15 ENCOUNTER — NURSE TRIAGE (OUTPATIENT)
Dept: CALL CENTER | Facility: HOSPITAL | Age: 47
End: 2022-04-15

## 2022-04-15 ENCOUNTER — TELEPHONE (OUTPATIENT)
Dept: CARDIOLOGY | Facility: CLINIC | Age: 47
End: 2022-04-15

## 2022-04-15 ENCOUNTER — READMISSION MANAGEMENT (OUTPATIENT)
Dept: CALL CENTER | Facility: HOSPITAL | Age: 47
End: 2022-04-15

## 2022-04-15 VITALS
SYSTOLIC BLOOD PRESSURE: 135 MMHG | DIASTOLIC BLOOD PRESSURE: 88 MMHG | WEIGHT: 280 LBS | OXYGEN SATURATION: 96 % | TEMPERATURE: 97.8 F | HEART RATE: 67 BPM | RESPIRATION RATE: 14 BRPM | HEIGHT: 72 IN | BODY MASS INDEX: 37.93 KG/M2

## 2022-04-15 LAB
ANION GAP SERPL CALCULATED.3IONS-SCNC: 7.9 MMOL/L (ref 5–15)
BUN SERPL-MCNC: 15 MG/DL (ref 6–20)
BUN/CREAT SERPL: 19 (ref 7–25)
CALCIUM SPEC-SCNC: 7.6 MG/DL (ref 8.6–10.5)
CHLORIDE SERPL-SCNC: 104 MMOL/L (ref 98–107)
CO2 SERPL-SCNC: 25.1 MMOL/L (ref 22–29)
CREAT SERPL-MCNC: 0.79 MG/DL (ref 0.76–1.27)
EGFRCR SERPLBLD CKD-EPI 2021: 111.6 ML/MIN/1.73
GLUCOSE SERPL-MCNC: 119 MG/DL (ref 65–99)
POTASSIUM SERPL-SCNC: 4.1 MMOL/L (ref 3.5–5.2)
SODIUM SERPL-SCNC: 137 MMOL/L (ref 136–145)

## 2022-04-15 PROCEDURE — 99217 PR OBSERVATION CARE DISCHARGE MANAGEMENT: CPT | Performed by: INTERNAL MEDICINE

## 2022-04-15 PROCEDURE — G0378 HOSPITAL OBSERVATION PER HR: HCPCS

## 2022-04-15 PROCEDURE — 99213 OFFICE O/P EST LOW 20 MIN: CPT | Performed by: INTERNAL MEDICINE

## 2022-04-15 PROCEDURE — 80048 BASIC METABOLIC PNL TOTAL CA: CPT | Performed by: INTERNAL MEDICINE

## 2022-04-15 RX ORDER — DILTIAZEM HYDROCHLORIDE 240 MG/1
240 CAPSULE, COATED, EXTENDED RELEASE ORAL
Qty: 30 CAPSULE | Refills: 0 | Status: SHIPPED | OUTPATIENT
Start: 2022-04-15 | End: 2022-05-05 | Stop reason: HOSPADM

## 2022-04-15 RX ADMIN — ATORVASTATIN CALCIUM 40 MG: 40 TABLET, FILM COATED ORAL at 07:51

## 2022-04-15 RX ADMIN — METOPROLOL TARTRATE 12.5 MG: 25 TABLET, FILM COATED ORAL at 07:51

## 2022-04-15 RX ADMIN — APIXABAN 5 MG: 2.5 TABLET, FILM COATED ORAL at 07:51

## 2022-04-15 RX ADMIN — DILTIAZEM HYDROCHLORIDE 240 MG: 120 CAPSULE, COATED, EXTENDED RELEASE ORAL at 07:50

## 2022-04-15 NOTE — CASE MANAGEMENT/SOCIAL WORK
Continued Stay Note  GABRIEL Haley     Patient Name: Santiago Zamora  MRN: [id number]  Today's Date: 4/15/2022    Admit Date: 4/13/2022     Discharge Plan     Row Name 04/15/22 0918       Plan    Plan plan home w wife    Plan Comments Noted patient with new Rx for Eliquis. Call to patients pharmacy, [facility] - spoke with Shahrzad - ID #s for coupon for 30 day free trial for Eliquis provided. Shahrzad states for patient to bring coupon when picking up Rx. Call to patients wife, Suzy, who states they have just arrived home. Explanation of coupon and need to present at pharmacy. Copy of coupon emailed to patients wife per request. Actual coupon taken to ER desk for patient to  if needed.               Discharge Codes    No documentation.               Expected Discharge Date and Time     Expected Discharge Date Expected Discharge Time    Apr 15, 2022             Ayad Odom RN

## 2022-04-15 NOTE — DISCHARGE SUMMARY
Santiago Zamora  1975  [id number]    Hospitalists Discharge Summary    Date of Admission: 4/13/2022  Date of Discharge:  4/15/2022    History of Present Illness from [facility] on admit: Patient is a 47-year-old male with past medical history significant for type 2 diabetes mellitus not requiring insulin, cardiomyopathy, depression, anxiety, hypertension.  Patient reports that he has had chest discomfort with palpitations that started last night he describes his discomfort as mild and intermittent in the front part of his chest.  He had recently been seen by his cardiologist Dr. Woods who placed him on a Holter monitor, however due to his symptoms worsening he sought care in the [place] ED today.      In the ED he was noted to have A. fib RVR with rate as high as the 180 range, cardiology reviewed his EKG and recommended observation in the ICU.  Patient converted to normal sinus rhythm on amiodarone drip.     Primary Discharge diagnoses: Afib RVR-converted to NSR on amiodarone transitioned to Diltiazem and Lopressor orally. Now on Eliquis.     Secondary Discharge Diagnoses: Obstructive sleep apnea-pending evaluation by sleep medicine on 5/18/2022, depression, hyperlipidemia, diabetes mellitus type 2, hypertension.     Hospital Course Summary: Patient was admitted for A. fib RVR, initially heart rate was in the 180s and had nonsustained episodes of V. Tach.  He was seen in consultation by cardiology after being placed on amiodarone drip in the ER and converting to normal sinus rhythm, he maintained normal sinus rhythm for approximately 18 hours prior to cardiology changing him to oral diltiazem with increased dose from the dose he had come in on for 1 week, and changing Coreg to metoprolol 12.5 mg twice daily.  With this regimen he remained in normal sinus rhythm with normal heart rate.  Patient reported no additional episodes of palpitations.  Patient's blood pressure was on the lower end throughout his stay  therefore I will resume only his home lisinopril and hold spironolactone on discharge.  Patient was counseled extensively by cardiology group on need for obstructive sleep apnea evaluation, he has ambulatory referral for 5/18/2022 scheduled, and he is agreeable to keeping this appointment. On 4/15/2022 patient's condition had improved. They were deemed stable for discharge. They were advised to take all medications as prescribed, follow up with PCP within 1 week. If there are any issues patient should contact PCP and/or return to the ED for follow up. Patient was agreeable to the plan and subsequently discharged at this time.     PCP  Patient Care Team:  Quinn Stoner DO as PCP - General (Family Medicine)  Quinn Stoner DO    Consults:   Consults     Date and Time Order Name Status Description    4/13/2022  1:28 PM Inpatient Cardiology Consult Completed     4/7/2022  8:03 PM Inpatient Cardiology Consult Completed           Operations and Procedures Performed:       Adult Transthoracic Echo Complete w/ Color, Spectral and Contrast if necessary per protocol    Result Date: 4/8/2022  Narrative: · Left ventricular wall thickness is consistent with mild concentric hypertrophy. · Calculated left ventricular EF = 57.8% Estimated left ventricular EF was in agreement with the calculated left ventricular EF. Left ventricular systolic function is normal. · Estimated right ventricular systolic pressure from tricuspid regurgitation is normal (<35 mmHg). · Left ventricular diastolic function was normal.      XR Chest 1 View    Result Date: 4/13/2022  Narrative: AP PORTABLE CHEST, 04/13/2022  HISTORY: Chest pain and dizziness since last night  COMPARISON: 04/07/2022  TECHNIQUE: AP portable chest x-ray.  FINDINGS: Heart size and pulmonary vascularity are normal. The lungs are expanded and clear. No visible pulmonary infiltrate or pleural effusion.       Impression: Negative single view chest  This report was finalized on  4/13/2022 10:39 AM by Dr. Dom Tarango MD.      XR Chest 1 View    Result Date: 4/7/2022  Narrative: CHEST X-RAY, 4/7/2022  HISTORY:  47-year-old male in the ED complaining of several day history of intermittent chest pain, heart palpitations. Atrial fibrillation is noted. Diabetes, morbid obesity, hypertension. TECHNIQUE: AP portable chest x-ray. COMPARISON: *  Chest x-ray, 11/4/2012. FINDINGS: Mild cardiomegaly. Pulmonary vascularity is normal. The lungs appear clear. No visible pulmonary edema, focal infiltrate or pleural effusion. Low lung volumes.     Impression: Cardiomegaly. Lungs clear. No active disease. Signer Name: Quinn Frankel MD  Signed: 4/7/2022 6:35 PM  Workstation Name: [email or web address]  [facility]      Allergies:  is allergic to eggs or egg-derived products and fish-derived products.    Benigno  Reviewed    Discharge Medications:     Discharge Medications      New Medications      Instructions Start Date   apixaban 5 MG tablet tablet  Commonly known as: ELIQUIS   5 mg, Oral, Every 12 Hours Scheduled      metoprolol tartrate 25 MG tablet  Commonly known as: LOPRESSOR   12.5 mg, Oral, Every 12 Hours Scheduled         Changes to Medications      Instructions Start Date   dilTIAZem  MG 24 hr capsule  Commonly known as: CARDIZEM CD  What changed:   · medication strength  · how much to take  · when to take this   240 mg, Oral, Every 24 Hours Scheduled         Continue These Medications      Instructions Start Date   1st Choice Lancets Super Thin misc   USE TO TEST GLUCOSE DAILY      atorvastatin 40 MG tablet  Commonly known as: LIPITOR   40 mg, Oral, Daily      citalopram 40 MG tablet  Commonly known as: CeleXA   40 mg, Oral, Daily      cyclobenzaprine 10 MG tablet  Commonly known as: FLEXERIL   10 mg, Oral, 3 Times Daily PRN      empagliflozin 25 MG tablet tablet  Commonly known as: Jardiance   25 mg, Oral, Daily      glimepiride 4 MG tablet  Commonly known as: Amaryl   4  mg, Oral, Every Morning Before Breakfast      glucose blood test strip  Commonly known as: Accu-Chek Rebecca   Test blood sugar once daily as instructed      glucose monitor monitoring kit   Use accu-chek Rebecca glucometer to check your blood sugar once daily      lisinopril 20 MG tablet  Commonly known as: PRINIVIL,ZESTRIL   20 mg, Oral, Daily         Stop These Medications    aspirin 81 MG chewable tablet     carvedilol 25 MG tablet  Commonly known as: COREG     spironolactone 25 MG tablet  Commonly known as: ALDACTONE            Last Lab Results:   Lab Results (most recent)     Procedure Component Value Units Date/Time    Basic Metabolic Panel [[id number]] Collected: 04/15/22 0615    Specimen: Blood Updated: 04/15/22 0621    POC Glucose Once [[id number]]  (Normal) Collected: 04/14/22 1652    Specimen: Blood Updated: 04/14/22 1701     Glucose 100 mg/dL      Comment: Meter: [id number] : [id number] [place]       POC Glucose Once [[id number]]  (Abnormal) Collected: 04/14/22 1118    Specimen: Blood Updated: 04/14/22 1126     Glucose 157 mg/dL      Comment: Meter: [id number] : [id number] [place]       Basic Metabolic Panel [[id number]]  (Abnormal) Collected: 04/14/22 0610    Specimen: Blood from Arm, Left Updated: 04/14/22 0636     Glucose 142 mg/dL      BUN 18 mg/dL      Creatinine 0.83 mg/dL      Sodium 134 mmol/L      Potassium 4.4 mmol/L      Chloride 100 mmol/L      CO2 23.5 mmol/L      Calcium 9.3 mg/dL      BUN/Creatinine Ratio 21.7     Anion Gap 10.5 mmol/L      eGFR 110.0 mL/min/1.73      Comment: National Kidney Foundation and American Society of Nephrology (ASN) Task Force recommended calculation based on the Chronic Kidney Disease Epidemiology Collaboration (CKD-EPI) equation refit without adjustment for race.       Narrative:      GFR Normal >60  Chronic Kidney Disease <60  Kidney Failure <15      Troponin [[id number]]  (Normal) Collected: 04/13/22 1628    Specimen: Blood Updated: 04/13/22  1656     Troponin T <0.010 ng/mL     Narrative:      Troponin T Reference Range:  <= 0.03 ng/mL-   Negative for AMI  >0.03 ng/mL-     Abnormal for myocardial necrosis.  Clinicians would have to utilize clinical acumen, EKG, Troponin and serial changes to determine if it is an Acute Myocardial Infarction or myocardial injury due to an underlying chronic condition.       Results may be falsely decreased if patient taking Biotin.      COVID PRE-OP / PRE-PROCEDURE SCREENING ORDER (NO ISOLATION) - Swab, Nasal Cavity [[id number]] Collected: 04/13/22 1223    Specimen: Swab from Nasal Cavity Updated: 04/13/22 1317    Narrative:      The following orders were created for panel order COVID PRE-OP / PRE-PROCEDURE SCREENING ORDER (NO ISOLATION) - Swab, Nasal Cavity.  Procedure                               Abnormality         Status                     ---------                               -----------         ------                     COVID-19,[facility] [employer] IN-CHASE...[[id number]]                      Final result                 Please view results for these tests on the individual orders.    COVID-19,[facility] [employer] IN-HOUSE,Nasal Swab No Transport Media 3-4 HR TAT - Swab, Nasal Cavity [[id number]] Collected: 04/13/22 1223    Specimen: Swab from Nasal Cavity Updated: 04/13/22 1317     COVID19 Not Detected     Lot Number [id number]     Expiration Date 05/12/2023    Narrative:      Fact sheet for providers: https://www.fda.gov/media/296617/download     Fact sheet for patients: https://www.fda.gov/media/578091/download    Test performed by PCR.    Consider negative results in combination with clinical observations, patient history, and epidemiological information.    Magnesium [[id number]]  (Normal) Collected: 04/13/22 0856    Specimen: Blood Updated: 04/13/22 1154     Magnesium 2.2 mg/dL     Comprehensive Metabolic Panel [[id number]]  (Abnormal) Collected: 04/13/22 0856    Specimen: Blood Updated: 04/13/22 0933     Glucose 175 mg/dL      BUN  20 mg/dL      Creatinine 0.94 mg/dL      Sodium 135 mmol/L      Potassium 4.8 mmol/L      Comment: Slight hemolysis detected by analyzer. Results may be affected.        Chloride 100 mmol/L      CO2 22.0 mmol/L      Calcium 9.7 mg/dL      Total Protein 7.4 g/dL      Albumin 4.20 g/dL      ALT (SGPT) 16 U/L      AST (SGOT) 15 U/L      Alkaline Phosphatase 67 U/L      Total Bilirubin 0.4 mg/dL      Globulin 3.2 gm/dL      A/G Ratio 1.3 g/dL      BUN/Creatinine Ratio 21.3     Anion Gap 13.0 mmol/L      eGFR 101.9 mL/min/1.73      Comment: National Kidney Foundation and American Society of Nephrology (ASN) Task Force recommended calculation based on the Chronic Kidney Disease Epidemiology Collaboration (CKD-EPI) equation refit without adjustment for race.       Narrative:      GFR Normal >60  Chronic Kidney Disease <60  Kidney Failure <15      Troponin [[id number]]  (Normal) Collected: 04/13/22 0856    Specimen: Blood Updated: 04/13/22 0930     Troponin T <0.010 ng/mL     Narrative:      Troponin T Reference Range:  <= 0.03 ng/mL-   Negative for AMI  >0.03 ng/mL-     Abnormal for myocardial necrosis.  Clinicians would have to utilize clinical acumen, EKG, Troponin and serial changes to determine if it is an Acute Myocardial Infarction or myocardial injury due to an underlying chronic condition.       Results may be falsely decreased if patient taking Biotin.      CBC & Differential [[id number]]  (Abnormal) Collected: 04/13/22 0856    Specimen: Blood Updated: 04/13/22 0911    Narrative:      The following orders were created for panel order CBC & Differential.  Procedure                               Abnormality         Status                     ---------                               -----------         ------                     CBC Auto Differential[[id number]]        Abnormal            Final result                 Please view results for these tests on the individual orders.    CBC Auto Differential [[id number]]   (Abnormal) Collected: 04/13/22 0856    Specimen: Blood Updated: 04/13/22 0911     WBC 9.34 10*3/mm3      RBC 6.63 10*6/mm3      Hemoglobin 17.3 g/dL      Hematocrit 54.4 %      MCV 82.1 fL      MCH 26.1 pg      MCHC 31.8 g/dL      RDW 14.0 %      RDW-SD 41.9 fl      MPV 11.1 fL      Platelets 290 10*3/mm3      Neutrophil % 67.6 %      Lymphocyte % 21.5 %      Monocyte % 7.3 %      Eosinophil % 3.1 %      Basophil % 0.2 %      Immature Grans % 0.3 %      Neutrophils, Absolute 6.31 10*3/mm3      Lymphocytes, Absolute 2.01 10*3/mm3      Monocytes, Absolute 0.68 10*3/mm3      Eosinophils, Absolute 0.29 10*3/mm3      Basophils, Absolute 0.02 10*3/mm3      Immature Grans, Absolute 0.03 10*3/mm3         Imaging Results (Most Recent)     Procedure Component Value Units Date/Time    XR Chest 1 View [[id number]] Collected: 04/13/22 1038     Updated: 04/13/22 1041    Narrative:      AP PORTABLE CHEST, 04/13/2022     HISTORY:  Chest pain and dizziness since last night     COMPARISON:  04/07/2022     TECHNIQUE:  AP portable chest x-ray.     FINDINGS:  Heart size and pulmonary vascularity are normal. The lungs are expanded  and clear. No visible pulmonary infiltrate or pleural effusion.           Impression:      Negative single view chest     This report was finalized on 4/13/2022 10:39 AM by Dr. Dom Tarango MD.             PROCEDURES      Condition on Discharge:  Stable, Improved.     Physical Exam at Discharge  Vital Signs  Temp:  [97.4 °F (36.3 °C)-98.3 °F (36.8 °C)] 98.3 °F (36.8 °C)  Heart Rate:  [61-82] 67  Resp:  [14-18] 14  BP: (105-143)/(68-95) 127/79    Physical Exam  Physical Exam:  General: Patient is awake and alert. Obese male. No acute distress noted.   HENT: Head is atraumatic, normocephalic. Hearing is grossly intact. Nose is without obvious congestion and appears patent. Neck is supple and trachea is midline.   Eyes: Vision is grossly intact. Pupils appear equal and round.   Cardiovascular: Heart has regular rate  and rhythm with no murmurs, rubs or gallops noted.   Respiratory: Lungs are clear to ausculation without wheezes, rhonchi or rales.   Abdominal/GI: Soft, nontender, bowel sounds present. No rebound or guarding present.   Extremities: No peripheral edema noted.   Musculoskeletal: Spontaneous movement of bilateral upper and lower extremities against gravity noted. No signs of injury or deformity noted.   Skin: Warm and dry.   Psych: Mood and affect are appropriate. Cooperative with exam.   Neuro: No facial asymmetry noted. No focal deficits noted, hearing and vision are grossly intact.      Discharge Disposition  To home    Visiting Nurse:    No    Home PT/OT:  No    Home Safety Evaluation:  No    DME  No new equipment    Discharge Diet:      Dietary Orders (From admission, onward)     Start     Ordered    04/13/22 1329  Diet Regular; Consistent Carbohydrate  Diet Effective Now        Question Answer Comment   Diet Texture / Consistency Regular    Common Modifiers Consistent Carbohydrate        04/13/22 1328                Activity at Discharge:  As tolerated    Pre-discharge education  Diabetes      Follow-up Appointments  Future Appointments   Date Time Provider Department Center   5/12/2022 10:00 AM Rosario Millard APRN [facility]   5/18/2022  2:15 PM Radha Zuluaga APRN [facility] None     Additional Instructions for the Follow-ups that You Need to Schedule     Discharge Follow-up with PCP   As directed       Currently Documented PCP:    Quinn Stoner DO    PCP Phone Number:    [phone]     Follow Up Details: 1 week         Discharge Follow-up with Specialty: Cardiology; 3 Days   As directed      Specialty: Cardiology    Follow Up: 3 Days    Follow Up Details: new Afib               Test Results Pending at Discharge  Pending Labs     Order Current Status    Basic Metabolic Panel In process          Discharge over 30 min (if over 30 minutes give explanation as to why it took greater than 30  minutes)  Secondary to:   Coordination of care/follow up  Medication reconciliation  D/W patient      As of April 2021, as required by the Federal 21st Century Cures Act, medical records (including provider notes and laboratory/imaging results) are to be made available to patients and/or their designees as soon as the documents are signed/resulted. While the intention is to ensure transparency and to engage patients in their healthcare, this immediate access may create unintended consequences because this document uses language intended for communication between medical providers for interpretation with the entirety of the patient's clinical picture in mind. It is recommended that patients and/or their designees review all available information with their primary or specialist providers for explanation and to avoid misinterpretation of this information.    Carlo Barraza DO  04/15/22  06:56 EDT

## 2022-04-15 NOTE — TELEPHONE ENCOUNTER
Dc from [facility], heart rate , returning to the ED    Reason for Disposition  • [1] Heart beating very rapidly (e.g., > 140 / minute) AND [2] not present now  (Exception: during exercise)    Additional Information  • Negative: Passed out (i.e., lost consciousness, collapsed and was not responding)  • Negative: Shock suspected (e.g., cold/pale/clammy skin, too weak to stand, low BP, rapid pulse)  • Negative: Difficult to awaken or acting confused (e.g., disoriented, slurred speech)  • Negative: Visible sweat on face or sweat dripping down face  • Negative: Unable to walk, or can only walk with assistance (e.g., requires support)  • Negative: [1] Received SHOCK from implantable cardiac defibrillator AND [2] persisting symptoms (i.e., palpitations, lightheadedness)  • Negative: [1] Dizziness, lightheadedness, or weakness AND [2] heart beating very rapidly (e.g., > 140 / minute)  • Negative: [1] Dizziness, lightheadedness, or weakness AND [2] heart beating very slowly  (e.g., < 50 / minute)  • Negative: Sounds like a life-threatening emergency to the triager  • Negative: Chest pain  • Negative: Implantable Cardiac Defibrillator (ICD) or a pacemaker symptoms or questions  • Negative: Difficulty breathing  • Negative: Dizziness, lightheadedness, or weakness  • Negative: [1] Heart beating very rapidly (e.g., > 140 / minute) AND [2] present now  (Exception: during exercise)  • Negative: Heart beating very slowly (e.g., < 50 / minute)  (Exception: athlete and heart rate normal for caller)  • Negative: New or worsened shortness of breath with activity (dyspnea on exertion)  • Negative: Patient sounds very sick or weak to the triager  • Negative: [1] Skipped or extra beat(s) AND [2] increases with exercise or exertion  • Negative: [1] Skipped or extra beat(s) AND [2] occurs 4 or more times per minute  • Negative: New or worsened ankle swelling  • Negative: History of heart disease  (i.e., heart attack, bypass surgery,  "angina, angioplasty, CHF) (Exception: brief heartbeat symptoms that went away and now feels well)  • Negative: Age > 60 years (Exception: brief heartbeat symptoms that went away and now feels well)  • Negative: Taking water pill (i.e., diuretic) or heart medication (e.g., digoxin)    Answer Assessment - Initial Assessment Questions  1. DESCRIPTION: \"Please describe your heart rate or heartbeat that you are having\" (e.g., fast/slow, regular/irregular, skipped or extra beats, \"palpitations\")  palpations  2. ONSET: \"When did it start?\" (Minutes, hours or days)       now  3. DURATION: \"How long does it last\" (e.g., seconds, minutes, hours)      na  4. PATTERN \"Does it come and go, or has it been constant since it started?\"  \"Does it get worse with exertion?\"   \"Are you feeling it now?\"      na  5. TAP: \"Using your hand, can you tap out what you are feeling on a chair or table in front of you, so that I can hear?\" (Note: not all patients can do this)        na  6. HEART RATE: \"Can you tell me your heart rate?\" \"How many beats in 15 seconds?\"  (Note: not all patients can do this)          7. RECURRENT SYMPTOM: \"Have you ever had this before?\" If Yes, ask: \"When was the last time?\" and \"What happened that time?\"       na  8. CAUSE: \"What do you think is causing the palpitations?\"      unsure  9. CARDIAC HISTORY: \"Do you have any history of heart disease?\" (e.g., heart attack, angina, bypass surgery, angioplasty, arrhythmia)       yes  10. OTHER SYMPTOMS: \"Do you have any other symptoms?\" (e.g., dizziness, chest pain, sweating, difficulty breathing)     Feels anxious  11. PREGNANCY: \"Is there any chance you are pregnant?\" \"When was your last menstrual period?\"        na    Protocols used: HEART RATE AND HEARTBEAT QUESTIONS-ADULT-AH      "

## 2022-04-15 NOTE — PROGRESS NOTES
LOS: 0 days   Patient Care Team:  Quinn Stoner DO as PCP - General (Family Medicine)  Quinn Stoner DO    Chief Complaint:     F/u a fib    Interval History:     He denies chest pain or pressure.  He has no difficulty breathing.  Does not feels heart racing or skipping.  He has no dizziness.      Objective   Vital Signs  Temp:  [97.4 °F (36.3 °C)-98.3 °F (36.8 °C)] 98.3 °F (36.8 °C)  Heart Rate:  [63-82] 67  Resp:  [14-18] 14  BP: (111-143)/(68-95) 127/79    Intake/Output Summary (Last 24 hours) at 4/15/2022 0741  Last data filed at 4/14/2022 1605  Gross per 24 hour   Intake 934.51 ml   Output --   Net 934.51 ml       Comfortable NAD  Neck supple, no JVD or thyromegaly appreciated  S1/S2 RRR, no m/r/g  Lungs CTA B, normal effort  Abdomen S/NT/ND (+) BS, no HSM appreciated  Extremities warm, no clubbing, cyanosis, or edema  No visible or palpable skin lesions  A/Ox4, mood and affect appropriate    Results Review:      Results from last 7 days   Lab Units 04/15/22  0615 04/14/22  0610 04/13/22  1628   SODIUM mmol/L 137 134* 137   POTASSIUM mmol/L 4.1 4.4 3.9   CHLORIDE mmol/L 104 100 101   CO2 mmol/L 25.1 23.5 21.1*   BUN mg/dL 15 18 17   CREATININE mg/dL 0.79 0.83 0.74*   GLUCOSE mg/dL 119* 142* 214*   CALCIUM mg/dL 7.6* 9.3 8.5*     Results from last 7 days   Lab Units 04/13/22  1628 04/13/22  0856   TROPONIN T ng/mL <0.010 <0.010     Results from last 7 days   Lab Units 04/13/22  0856   WBC 10*3/mm3 9.34   HEMOGLOBIN g/dL 17.3   HEMATOCRIT % 54.4*   PLATELETS 10*3/mm3 290             Results from last 7 days   Lab Units 04/13/22  0856   MAGNESIUM mg/dL 2.2           I reviewed the patient's new clinical results.  I personally viewed and interpreted the patient's EKG/Telemetry data        Medication Review:   apixaban, 5 mg, Oral, Q12H  atorvastatin, 40 mg, Oral, Daily  dilTIAZem CD, 240 mg, Oral, Q24H  insulin aspart, 0-9 Units, Subcutaneous, TID AC  metoprolol tartrate, 12.5 mg, Oral, Q12H  sodium  chloride, 10 mL, Intravenous, Q12H             Assessment/Plan       Paroxysmal atrial fibrillation (HCC)        1.  Atrial fibrillation - CHADS2-VASc is 2.  He was started on amiodarone drip and converted back to NSR.  On diltiazem at 240 mg daily and added metoprolol tartrate 12.5 mg BID.  On OAC with Eliquis 5 mg BID  2. Episodic Vtach - nonsustained.   3.  HTN - well controlled  4. DM - per primry  5,  Hyperlipidemia - continue statin therapy  6.  History of cardiomyopathy - resolved, EF now 57% from echo 4/8/2022  7.  Obesity - he used to weight ~400 lbs but has lost over 100 pounds  8.  VIDHYA - untreated, could not tolerate the CPAP.  He has been referred back to sleep medicine as he may be a candidate for [facility]    He has an appointment on May 12 with [facility] and has an appointment in May with the sleep medicine service.  Would discharge today on metoprolol, diltiazem and lisinopril.  Would discontinue spironolactone.  Advised him to call us should his heart race, dyspnea increase or have weight increase of edema.  Otherwise, I think you go home today.    Gricelda Woods MD  04/15/22  07:41 EDT

## 2022-04-15 NOTE — TELEPHONE ENCOUNTER
Return if HR >150 for 1 hour or severe dizziness.  If HR >120, take and extra metoprolol and if HR does not decrease in 10 minutes then take an extra diltiazem and relax.  If persists 30 min after that then call us.  Pls let them know.

## 2022-04-15 NOTE — OUTREACH NOTE
Prep Survey    Flowsheet Row Responses   [facility] facility patient discharged from? [facility]   Is LACE score < 7 ? Yes   Emergency Room discharge w/ pulse ox? No   Eligibility [facility]   Hospital [facility]   Date of Admission 04/13/22   Date of Discharge 04/15/22   Discharge Disposition Home or Self Care   Discharge diagnosis Afib with RVR   Does the patient have one of the following disease processes/diagnoses(primary or secondary)? Other   Does the patient have Home health ordered? No   Is there a DME ordered? No   Prep survey completed? Yes          BRADLEY MCKEON - Registered Nurse

## 2022-04-15 NOTE — TELEPHONE ENCOUNTER
pls call and find out how he is.     rm    ----- Message from Cristhian Peterson sent at 4/15/2022  1:26 PM EDT -----  Dr. Woods,  Pt's wife called. Pt is home from ICU. Pt is concerned because his Apple watch does not indicated a fib, but the pulse has been up to 168 and as low as 71. Wife wanted to know if pt needs to return to the hospital.      Thanks,  Tran

## 2022-04-15 NOTE — TELEPHONE ENCOUNTER
Called Santiago Zamora for update.    Patient stated his heart rate jumped up to 168 bpm, then came back down to about 115bpm and bounced around.  This lasted for about 1 minute.  During this episode, patient reported feeling lightheaded and that he felt like his heart was fluttering.    Patient is resting now, with reported HR of 88bpm.    Educated patient and his spouse about atrial fibrillation and provided general guidance to return to ED for sustained heart rate of greater than 130bpm for an hour.  Patient's spouse verbalized understanding.    Patient is asking when should he be concerned about an elevated heart rate and when he should return to the ED?    Please let me know how you would like to proceed.    Thank you,  Kimmie Wheeler, RN  Triage Nurse [facility]

## 2022-04-15 NOTE — TELEPHONE ENCOUNTER
Reviewed recommendations with Santiago Zamora and he verbalized understanding.  Patient wrote down instructions and read them back.      Please let me know if there is anything else you would like me to do for this patient.    Thank you,  Kimmie Wheeler RN  Triage Nurse [facility]

## 2022-04-16 NOTE — CASE MANAGEMENT/SOCIAL WORK
Case Management Discharge Note      Final Note: dc home    Provided Post Acute Provider List?: Refused  Refused Provider List Comment: offered community resources but patient declines the need for them at this rina.e    Selected Continued Care - Discharged on 4/15/2022 Admission date: 4/13/2022 - Discharge disposition: Home or Self Care    Destination    No services have been selected for the patient.              Durable Medical Equipment    No services have been selected for the patient.              Dialysis/Infusion    No services have been selected for the patient.              Home Medical Care    No services have been selected for the patient.              Therapy    No services have been selected for the patient.              Community Resources    No services have been selected for the patient.              Community & DME    No services have been selected for the patient.                       Final Discharge Disposition Code: 01 - home or self-care

## 2022-04-18 ENCOUNTER — TRANSITIONAL CARE MANAGEMENT TELEPHONE ENCOUNTER (OUTPATIENT)
Dept: CALL CENTER | Facility: HOSPITAL | Age: 47
End: 2022-04-18

## 2022-04-18 NOTE — OUTREACH NOTE
Call Center TCM Note    Flowsheet Row Responses   [facility] [place] patient discharged from? [place]   Does the patient have one of the following disease processes/diagnoses(primary or secondary)? Other   TCM attempt successful? Yes   Call start time 1232   Call end time 1236   Discharge diagnosis Afib with RVR   Medication alerts for this patient ELIQUIS--bleeding precautions advised   Meds reviewed with patient/caregiver? Yes   Is the patient having any side effects they believe may be caused by any medication additions or changes? No   Does the patient have all medications ordered at discharge? Yes   Is the patient taking all medications as directed (includes completed medication regime)? Yes   Comments regarding appointments See AVS--patient has upcoming Sleep Study planned   Does the patient have a primary care provider?  Yes   Does the patient have an appointment with their PCP within 7 days of discharge? No   Comments regarding PCP Patient reports his wife has called office and is awaiting call, prefers to self-schedule   What is preventing the patient from scheduling follow up appointments within 7 days of discharge? Waiting on return call   Nursing Interventions Advised patient to make appointment   Has the patient kept scheduled appointments due by today? N/A   Has home health visited the patient within 72 hours of discharge? N/A   Psychosocial issues? No   Did the patient receive a copy of their discharge instructions? Yes   Nursing interventions Reviewed instructions with patient   What is the patient's perception of their health status since discharge? Improving  [Feels much better-no Chest pain or palpitations, feels as if he is in normal rhythm.  Patient is walking right now and pulse is 94 but has been in 60 to 70's.  Patient aware of return precautions. ]   Is the patient/caregiver able to teach back signs and symptoms related to disease process for when to call PCP? Yes   Is the patient/caregiver  able to teach back signs and symptoms related to disease process for when to call 911? Yes   TCM call completed? Yes          Marissa Storey RN    4/18/2022, 12:39 EDT

## 2022-04-26 ENCOUNTER — TELEPHONE (OUTPATIENT)
Dept: CARDIOLOGY | Facility: CLINIC | Age: 47
End: 2022-04-26

## 2022-04-26 DIAGNOSIS — I48.91 ATRIAL FIBRILLATION WITH RVR: Primary | ICD-10-CM

## 2022-04-26 DIAGNOSIS — I47.20 VT (VENTRICULAR TACHYCARDIA): ICD-10-CM

## 2022-04-26 LAB
MAXIMAL PREDICTED HEART RATE: 175 BPM
STRESS TARGET HR: 149 BPM

## 2022-04-26 PROCEDURE — 93248 EXT ECG>7D<15D REV&INTERPJ: CPT | Performed by: INTERNAL MEDICINE

## 2022-04-26 NOTE — TELEPHONE ENCOUNTER
Called the patient and told him the results of the monitor which shows atrial fibrillation and ventricular tachycardia.  We will set him up for cardiac catheterization.  Procedure explained to the patient and he is amenable to proceed.  Continue current medications.

## 2022-04-27 ENCOUNTER — TELEPHONE (OUTPATIENT)
Dept: CARDIOLOGY | Facility: CLINIC | Age: 47
End: 2022-04-27

## 2022-04-27 ENCOUNTER — TRANSCRIBE ORDERS (OUTPATIENT)
Dept: CARDIOLOGY | Facility: CLINIC | Age: 47
End: 2022-04-27

## 2022-04-27 DIAGNOSIS — Z01.818 OTHER SPECIFIED PRE-OPERATIVE EXAMINATION: Primary | ICD-10-CM

## 2022-04-27 DIAGNOSIS — Z01.810 PRE-OPERATIVE CARDIOVASCULAR EXAMINATION: ICD-10-CM

## 2022-04-27 DIAGNOSIS — Z13.6 SCREENING FOR ISCHEMIC HEART DISEASE: ICD-10-CM

## 2022-04-27 PROBLEM — I47.20 VT (VENTRICULAR TACHYCARDIA): Status: ACTIVE | Noted: 2022-04-27

## 2022-04-27 RX ORDER — PREDNISONE 20 MG/1
40 TABLET ORAL 3 TIMES DAILY
Qty: 6 TABLET | Refills: 0 | Status: SHIPPED | OUTPATIENT
Start: 2022-04-27 | End: 2022-04-27

## 2022-04-27 RX ORDER — PREDNISONE 50 MG/1
TABLET ORAL
Qty: 3 TABLET | Refills: 0 | Status: SHIPPED | OUTPATIENT
Start: 2022-04-27 | End: 2022-05-17

## 2022-04-27 RX ORDER — FAMOTIDINE 10 MG
10 TABLET ORAL 3 TIMES DAILY
Qty: 6 TABLET | Refills: 0 | Status: SHIPPED | OUTPATIENT
Start: 2022-04-27 | End: 2022-05-17

## 2022-04-27 NOTE — TELEPHONE ENCOUNTER
I have placed orders for contrast/fish derived products allergy before heart cath.    Patient to take prednisone 50 mg 1 dose 13 hours, 1 dose 7 hours, and 1 dose 1 hour prior to test time for 3 doses total.  He will need to take Benadryl 50 mg 1 dose 1 hour prior to test time.    Prescription sent to his local pharmacy.  Patient informed.

## 2022-04-27 NOTE — TELEPHONE ENCOUNTER
"----- Message from Gricelda Woods MD sent at 4/26/2022  4:56 PM EDT -----  Regarding: FW: cath    Can one of you place orders for contrast allergy prep meds?  Really helpful Thx      RM    ----- Message -----  From: Albert Piña  Sent: 4/26/2022   4:40 PM EDT  To: Gricelda Woods MD  Subject: RE: cath                                         His allergy says \"fish -derived products\". Wasn't sure if he'll need allergy prep.   trm  ----- Message -----  From: Gricelda Woods MD  Sent: 4/26/2022   4:29 PM EDT  To: Albert Piña  Subject: RE: cath                                         He should not need allergy prep - no contrast allergy    rm  ----- Message -----  From: Albert Piña  Sent: 4/26/2022   4:08 PM EDT  To: Gricelda Woods MD, #  Subject: RE: cath                                         Dr. Woods,   Would you please send allergy prep to patient pharmacy to start before cath?  Will get him scheduled.   trm  ----- Message -----  From: Gricelda Woods MD  Sent: 4/26/2022   3:20 PM EDT  To: Fallon Brown RegSched Rep  Subject: cath                                             Needs cath done Monday - pls call him    rm            "

## 2022-04-29 ENCOUNTER — LAB (OUTPATIENT)
Dept: LAB | Facility: HOSPITAL | Age: 47
End: 2022-04-29

## 2022-04-29 DIAGNOSIS — Z01.818 OTHER SPECIFIED PRE-OPERATIVE EXAMINATION: ICD-10-CM

## 2022-04-29 DIAGNOSIS — Z13.6 SCREENING FOR ISCHEMIC HEART DISEASE: ICD-10-CM

## 2022-04-29 DIAGNOSIS — Z01.810 PRE-OPERATIVE CARDIOVASCULAR EXAMINATION: ICD-10-CM

## 2022-04-29 LAB
ANION GAP SERPL CALCULATED.3IONS-SCNC: 13.1 MMOL/L (ref 5–15)
BASOPHILS # BLD AUTO: 0.06 10*3/MM3 (ref 0–0.2)
BASOPHILS NFR BLD AUTO: 0.8 % (ref 0–1.5)
BUN SERPL-MCNC: 14 MG/DL (ref 6–20)
BUN/CREAT SERPL: 13.3 (ref 7–25)
CALCIUM SPEC-SCNC: 10 MG/DL (ref 8.6–10.5)
CHLORIDE SERPL-SCNC: 98 MMOL/L (ref 98–107)
CO2 SERPL-SCNC: 24.9 MMOL/L (ref 22–29)
CREAT SERPL-MCNC: 1.05 MG/DL (ref 0.76–1.27)
DEPRECATED RDW RBC AUTO: 42.7 FL (ref 37–54)
EGFRCR SERPLBLD CKD-EPI 2021: 89.2 ML/MIN/1.73
EOSINOPHIL # BLD AUTO: 0.35 10*3/MM3 (ref 0–0.4)
EOSINOPHIL NFR BLD AUTO: 4.6 % (ref 0.3–6.2)
ERYTHROCYTE [DISTWIDTH] IN BLOOD BY AUTOMATED COUNT: 14.5 % (ref 12.3–15.4)
GLUCOSE SERPL-MCNC: 98 MG/DL (ref 65–99)
HCT VFR BLD AUTO: 48.2 % (ref 37.5–51)
HGB BLD-MCNC: 15.1 G/DL (ref 13–17.7)
IMM GRANULOCYTES # BLD AUTO: 0.03 10*3/MM3 (ref 0–0.05)
IMM GRANULOCYTES NFR BLD AUTO: 0.4 % (ref 0–0.5)
LYMPHOCYTES # BLD AUTO: 1.59 10*3/MM3 (ref 0.7–3.1)
LYMPHOCYTES NFR BLD AUTO: 20.7 % (ref 19.6–45.3)
MCH RBC QN AUTO: 25.6 PG (ref 26.6–33)
MCHC RBC AUTO-ENTMCNC: 31.3 G/DL (ref 31.5–35.7)
MCV RBC AUTO: 81.7 FL (ref 79–97)
MONOCYTES # BLD AUTO: 0.53 10*3/MM3 (ref 0.1–0.9)
MONOCYTES NFR BLD AUTO: 6.9 % (ref 5–12)
NEUTROPHILS NFR BLD AUTO: 5.11 10*3/MM3 (ref 1.7–7)
NEUTROPHILS NFR BLD AUTO: 66.6 % (ref 42.7–76)
NRBC BLD AUTO-RTO: 0 /100 WBC (ref 0–0.2)
PLATELET # BLD AUTO: 251 10*3/MM3 (ref 140–450)
PMV BLD AUTO: 11.3 FL (ref 6–12)
POTASSIUM SERPL-SCNC: 4.6 MMOL/L (ref 3.5–5.2)
RBC # BLD AUTO: 5.9 10*6/MM3 (ref 4.14–5.8)
SARS-COV-2 RNA PNL SPEC NAA+PROBE: NOT DETECTED
SODIUM SERPL-SCNC: 136 MMOL/L (ref 136–145)
WBC NRBC COR # BLD: 7.67 10*3/MM3 (ref 3.4–10.8)

## 2022-04-29 PROCEDURE — C9803 HOPD COVID-19 SPEC COLLECT: HCPCS

## 2022-04-29 PROCEDURE — 87635 SARS-COV-2 COVID-19 AMP PRB: CPT

## 2022-04-29 PROCEDURE — 80048 BASIC METABOLIC PNL TOTAL CA: CPT

## 2022-04-29 PROCEDURE — 36415 COLL VENOUS BLD VENIPUNCTURE: CPT

## 2022-04-29 PROCEDURE — 85025 COMPLETE CBC W/AUTO DIFF WBC: CPT

## 2022-05-02 ENCOUNTER — HOSPITAL ENCOUNTER (INPATIENT)
Facility: HOSPITAL | Age: 47
LOS: 3 days | Discharge: HOME | End: 2022-05-05
Attending: INTERNAL MEDICINE | Admitting: INTERNAL MEDICINE

## 2022-05-02 DIAGNOSIS — I47.20 VT (VENTRICULAR TACHYCARDIA): ICD-10-CM

## 2022-05-02 DIAGNOSIS — I48.91 ATRIAL FIBRILLATION WITH RVR: ICD-10-CM

## 2022-05-02 LAB
GLUCOSE BLDC GLUCOMTR-MCNC: 204 MG/DL (ref 70–130)
GLUCOSE BLDC GLUCOMTR-MCNC: 212 MG/DL (ref 70–130)
GLUCOSE BLDC GLUCOMTR-MCNC: 357 MG/DL (ref 70–130)
MAGNESIUM SERPL-MCNC: 2.2 MG/DL (ref 1.6–2.6)
QT INTERVAL: 371 MS

## 2022-05-02 PROCEDURE — 93458 L HRT ARTERY/VENTRICLE ANGIO: CPT | Performed by: INTERNAL MEDICINE

## 2022-05-02 PROCEDURE — 82962 GLUCOSE BLOOD TEST: CPT

## 2022-05-02 PROCEDURE — C1894 INTRO/SHEATH, NON-LASER: HCPCS | Performed by: INTERNAL MEDICINE

## 2022-05-02 PROCEDURE — 83735 ASSAY OF MAGNESIUM: CPT | Performed by: NURSE PRACTITIONER

## 2022-05-02 PROCEDURE — 63710000001 INSULIN LISPRO (HUMAN) PER 5 UNITS: Performed by: NURSE PRACTITIONER

## 2022-05-02 PROCEDURE — 93005 ELECTROCARDIOGRAM TRACING: CPT | Performed by: INTERNAL MEDICINE

## 2022-05-02 PROCEDURE — C1769 GUIDE WIRE: HCPCS | Performed by: INTERNAL MEDICINE

## 2022-05-02 PROCEDURE — B2151ZZ FLUOROSCOPY OF LEFT HEART USING LOW OSMOLAR CONTRAST: ICD-10-PCS | Performed by: INTERNAL MEDICINE

## 2022-05-02 PROCEDURE — 25010000002 MIDAZOLAM PER 1 MG: Performed by: INTERNAL MEDICINE

## 2022-05-02 PROCEDURE — 4A023N7 MEASUREMENT OF CARDIAC SAMPLING AND PRESSURE, LEFT HEART, PERCUTANEOUS APPROACH: ICD-10-PCS | Performed by: INTERNAL MEDICINE

## 2022-05-02 PROCEDURE — B2111ZZ FLUOROSCOPY OF MULTIPLE CORONARY ARTERIES USING LOW OSMOLAR CONTRAST: ICD-10-PCS | Performed by: INTERNAL MEDICINE

## 2022-05-02 PROCEDURE — 99152 MOD SED SAME PHYS/QHP 5/>YRS: CPT | Performed by: INTERNAL MEDICINE

## 2022-05-02 PROCEDURE — 25010000002 FENTANYL CITRATE (PF) 50 MCG/ML SOLUTION: Performed by: INTERNAL MEDICINE

## 2022-05-02 PROCEDURE — 0 IOPAMIDOL PER 1 ML: Performed by: INTERNAL MEDICINE

## 2022-05-02 PROCEDURE — 93005 ELECTROCARDIOGRAM TRACING: CPT | Performed by: NURSE PRACTITIONER

## 2022-05-02 RX ORDER — FENTANYL CITRATE 50 UG/ML
INJECTION, SOLUTION INTRAMUSCULAR; INTRAVENOUS AS NEEDED
Status: DISCONTINUED | OUTPATIENT
Start: 2022-05-02 | End: 2022-05-02 | Stop reason: HOSPADM

## 2022-05-02 RX ORDER — INSULIN LISPRO 100 [IU]/ML
0-9 INJECTION, SOLUTION INTRAVENOUS; SUBCUTANEOUS
Status: DISCONTINUED | OUTPATIENT
Start: 2022-05-02 | End: 2022-05-05 | Stop reason: HOSPADM

## 2022-05-02 RX ORDER — POTASSIUM CHLORIDE 1.5 G/1.77G
40 POWDER, FOR SOLUTION ORAL AS NEEDED
Status: DISCONTINUED | OUTPATIENT
Start: 2022-05-02 | End: 2022-05-05 | Stop reason: HOSPADM

## 2022-05-02 RX ORDER — NITROGLYCERIN 0.4 MG/1
0.4 TABLET SUBLINGUAL
Status: DISCONTINUED | OUTPATIENT
Start: 2022-05-02 | End: 2022-05-05 | Stop reason: HOSPADM

## 2022-05-02 RX ORDER — DILTIAZEM HYDROCHLORIDE 240 MG/1
240 CAPSULE, COATED, EXTENDED RELEASE ORAL
Status: DISCONTINUED | OUTPATIENT
Start: 2022-05-02 | End: 2022-05-02

## 2022-05-02 RX ORDER — DEXTROSE MONOHYDRATE 25 G/50ML
25 INJECTION, SOLUTION INTRAVENOUS
Status: DISCONTINUED | OUTPATIENT
Start: 2022-05-02 | End: 2022-05-05 | Stop reason: HOSPADM

## 2022-05-02 RX ORDER — ACETAMINOPHEN 325 MG/1
650 TABLET ORAL EVERY 4 HOURS PRN
Status: DISCONTINUED | OUTPATIENT
Start: 2022-05-02 | End: 2022-05-05 | Stop reason: HOSPADM

## 2022-05-02 RX ORDER — SODIUM CHLORIDE 9 MG/ML
75 INJECTION, SOLUTION INTRAVENOUS CONTINUOUS
Status: DISCONTINUED | OUTPATIENT
Start: 2022-05-02 | End: 2022-05-04

## 2022-05-02 RX ORDER — POTASSIUM CHLORIDE 750 MG/1
40 TABLET, FILM COATED, EXTENDED RELEASE ORAL AS NEEDED
Status: DISCONTINUED | OUTPATIENT
Start: 2022-05-02 | End: 2022-05-05 | Stop reason: HOSPADM

## 2022-05-02 RX ORDER — LISINOPRIL 20 MG/1
20 TABLET ORAL DAILY
Status: DISCONTINUED | OUTPATIENT
Start: 2022-05-02 | End: 2022-05-05 | Stop reason: HOSPADM

## 2022-05-02 RX ORDER — SODIUM CHLORIDE 0.9 % (FLUSH) 0.9 %
10 SYRINGE (ML) INJECTION EVERY 12 HOURS SCHEDULED
Status: DISCONTINUED | OUTPATIENT
Start: 2022-05-02 | End: 2022-05-02 | Stop reason: HOSPADM

## 2022-05-02 RX ORDER — SODIUM CHLORIDE 0.9 % (FLUSH) 0.9 %
10 SYRINGE (ML) INJECTION AS NEEDED
Status: DISCONTINUED | OUTPATIENT
Start: 2022-05-02 | End: 2022-05-02 | Stop reason: HOSPADM

## 2022-05-02 RX ORDER — NICOTINE POLACRILEX 4 MG
15 LOZENGE BUCCAL
Status: DISCONTINUED | OUTPATIENT
Start: 2022-05-02 | End: 2022-05-05 | Stop reason: HOSPADM

## 2022-05-02 RX ORDER — ATORVASTATIN CALCIUM 20 MG/1
40 TABLET, FILM COATED ORAL DAILY
Status: DISCONTINUED | OUTPATIENT
Start: 2022-05-02 | End: 2022-05-05 | Stop reason: HOSPADM

## 2022-05-02 RX ORDER — LIDOCAINE HYDROCHLORIDE 20 MG/ML
INJECTION, SOLUTION INFILTRATION; PERINEURAL AS NEEDED
Status: DISCONTINUED | OUTPATIENT
Start: 2022-05-02 | End: 2022-05-02 | Stop reason: HOSPADM

## 2022-05-02 RX ORDER — MIDAZOLAM HYDROCHLORIDE 1 MG/ML
INJECTION INTRAMUSCULAR; INTRAVENOUS AS NEEDED
Status: DISCONTINUED | OUTPATIENT
Start: 2022-05-02 | End: 2022-05-02 | Stop reason: HOSPADM

## 2022-05-02 RX ORDER — CITALOPRAM 40 MG/1
40 TABLET ORAL DAILY
Status: DISCONTINUED | OUTPATIENT
Start: 2022-05-02 | End: 2022-05-05 | Stop reason: HOSPADM

## 2022-05-02 RX ORDER — SODIUM CHLORIDE 9 MG/ML
100 INJECTION, SOLUTION INTRAVENOUS CONTINUOUS
Status: DISCONTINUED | OUTPATIENT
Start: 2022-05-02 | End: 2022-05-04

## 2022-05-02 RX ORDER — SOTALOL HYDROCHLORIDE 80 MG/1
160 TABLET ORAL EVERY 12 HOURS
Status: DISCONTINUED | OUTPATIENT
Start: 2022-05-02 | End: 2022-05-04

## 2022-05-02 RX ORDER — CYCLOBENZAPRINE HCL 10 MG
10 TABLET ORAL 3 TIMES DAILY PRN
Status: DISCONTINUED | OUTPATIENT
Start: 2022-05-02 | End: 2022-05-05 | Stop reason: HOSPADM

## 2022-05-02 RX ADMIN — INSULIN LISPRO 8 UNITS: 100 INJECTION, SOLUTION INTRAVENOUS; SUBCUTANEOUS at 16:17

## 2022-05-02 RX ADMIN — APIXABAN 5 MG: 5 TABLET, FILM COATED ORAL at 14:02

## 2022-05-02 RX ADMIN — EMPAGLIFLOZIN 25 MG: 25 TABLET, FILM COATED ORAL at 16:05

## 2022-05-02 RX ADMIN — ATORVASTATIN CALCIUM 40 MG: 20 TABLET, FILM COATED ORAL at 14:02

## 2022-05-02 RX ADMIN — LISINOPRIL 20 MG: 20 TABLET ORAL at 15:06

## 2022-05-02 RX ADMIN — SODIUM CHLORIDE 75 ML/HR: 9 INJECTION, SOLUTION INTRAVENOUS at 10:39

## 2022-05-02 RX ADMIN — APIXABAN 5 MG: 5 TABLET, FILM COATED ORAL at 20:40

## 2022-05-02 RX ADMIN — CITALOPRAM 40 MG: 40 TABLET, FILM COATED ORAL at 15:06

## 2022-05-02 RX ADMIN — SOTALOL HYDROCHLORIDE 160 MG: 80 TABLET ORAL at 15:06

## 2022-05-02 NOTE — PROGRESS NOTES
"[facility] Clinical Pharmacy Services: Sotalol Consult    Pharmacy has been consulted to look over Santiago Zamora's profile to review renal function and drug interactions before starting sotalol inpatient per Nuha Sorto APRN's request.    Relevant clinical data and objective history reviewed:  47 y.o. male 185.4 cm (73\") 123 kg (272 lb)    Past Medical History:   Diagnosis Date    Anxiety     Cardiomyopathy (HCC)     Depression     Diabetes mellitus (HCC)     Elevated cholesterol     Hyperlipidemia     Hypertension     Varicose veins      Creatinine   Date Value Ref Range Status   04/29/2022 1.05 0.76 - 1.27 mg/dL Final   04/15/2022 0.79 0.76 - 1.27 mg/dL Final   04/14/2022 0.83 0.76 - 1.27 mg/dL Final     BUN   Date Value Ref Range Status   04/29/2022 14 6 - 20 mg/dL Final     Estimated Creatinine Clearance: 122 mL/min (by C-G formula based on SCr of 1.05 mg/dL).    Assessment/Plan    1. Based on patient's renal function, dosing guidelines recommends a starting dose of sotalol 160 mg PO q12h. This dose may change after initial dosing depending on the EKG, or if the physician feels there is a reason for an alternative dose.    2. Looking over the patient's home medication list, the following medications interact with sotalol:    Diltiazem, lisinopril, lopressor, glimepiride - all minor    3. I have advised the patient to separate anything with magnesium, phosphate, or calcium from their sotalol dose by at least an hour.      4. I have also advised the patient on contraindications and potential drug interactions associated with sotalol, including antibiotics and antiemetics.     Thank you for allowing me to participate in your patient's care. Please call pharmacy with any questions or concerns.    Chris Castro, [facility]   "

## 2022-05-02 NOTE — PROGRESS NOTES
Reviewed case and EKG with Dr. Sanders who had previously spoken with Dr. Woods regarding this case---cors were normal with normal EF---will start sotalol 160 mg BID and he wants a cardiac MRI, orders placed. Will see patient tomorrow.

## 2022-05-02 NOTE — PLAN OF CARE
Goal Outcome Evaluation:  Plan of Care Reviewed With: patient, spouse           Outcome Evaluation: VSS upon arrival to unit. Pt is independent and ambulates with no assistance. Began sotalol admin and following proper follow-up protocols. No complaints of pain or discomfort at radial site, ace bandage removed and replaced with gauze and tegaderm. WCTM.

## 2022-05-02 NOTE — PROGRESS NOTES
"[facility] Clinical Pharmacy Services: Sotalol Consult    Pharmacy has been consulted to look over Santiago Zamora's profile to review renal function and drug interactions before starting sotalol inpatient per Nuha Sorto APRN's request.    Relevant clinical data and objective history reviewed:  47 y.o. male 185.4 cm (73\") 123 kg (272 lb)    Past Medical History:   Diagnosis Date    Anxiety     Cardiomyopathy (HCC)     Depression     Diabetes mellitus (HCC)     Elevated cholesterol     Hyperlipidemia     Hypertension     Varicose veins      Creatinine   Date Value Ref Range Status   04/29/2022 1.05 0.76 - 1.27 mg/dL Final   04/15/2022 0.79 0.76 - 1.27 mg/dL Final   04/14/2022 0.83 0.76 - 1.27 mg/dL Final     BUN   Date Value Ref Range Status   04/29/2022 14 6 - 20 mg/dL Final     Estimated Creatinine Clearance: 122 mL/min (by C-G formula based on SCr of 1.05 mg/dL).    Assessment/Plan    1. Based on patient's renal function, dosing guidelines recommends a starting dose of sotalol 160 mg PO q12h. This dose may change after initial dosing depending on the EKG, or if the physician feels there is a reason for an alternative dose.    2. Looking over the patient's home medication list, the following medications interact with sotalol:    Diltiazem, lisinopril, lopressor, glimepiride - all minor  Citalopram (major)- close monitoring via EKG per EP team    3. I have advised the patient to separate anything with magnesium, phosphate, or calcium from their sotalol dose by at least an hour.      4. I have also advised the patient on contraindications and potential drug interactions associated with sotalol, including antibiotics and antiemetics.     Thank you for allowing me to participate in your patient's care. Please call pharmacy with any questions or concerns.    Chris Castro RPH   "

## 2022-05-02 NOTE — INTERVAL H&P NOTE
He had VT on his monitor and is referred for left heart cath he has diabetes. H&P reviewed. The patient was examined and there are no changes to the H&P. I have explained the risks and benefits of the procedure to the patient.  The patient understands and agrees to proceed

## 2022-05-03 ENCOUNTER — APPOINTMENT (OUTPATIENT)
Dept: MRI IMAGING | Facility: HOSPITAL | Age: 47
End: 2022-05-03

## 2022-05-03 LAB
ANION GAP SERPL CALCULATED.3IONS-SCNC: 11 MMOL/L (ref 5–15)
BUN SERPL-MCNC: 19 MG/DL (ref 6–20)
BUN/CREAT SERPL: 24.4 (ref 7–25)
CALCIUM SPEC-SCNC: 9.1 MG/DL (ref 8.6–10.5)
CHLORIDE SERPL-SCNC: 103 MMOL/L (ref 98–107)
CO2 SERPL-SCNC: 22 MMOL/L (ref 22–29)
CREAT SERPL-MCNC: 0.78 MG/DL (ref 0.76–1.27)
DEPRECATED RDW RBC AUTO: 42.3 FL (ref 37–54)
EGFRCR SERPLBLD CKD-EPI 2021: 112.1 ML/MIN/1.73
ERYTHROCYTE [DISTWIDTH] IN BLOOD BY AUTOMATED COUNT: 14.4 % (ref 12.3–15.4)
GLUCOSE BLDC GLUCOMTR-MCNC: 134 MG/DL (ref 70–130)
GLUCOSE BLDC GLUCOMTR-MCNC: 145 MG/DL (ref 70–130)
GLUCOSE BLDC GLUCOMTR-MCNC: 150 MG/DL (ref 70–130)
GLUCOSE BLDC GLUCOMTR-MCNC: 159 MG/DL (ref 70–130)
GLUCOSE SERPL-MCNC: 142 MG/DL (ref 65–99)
HCT VFR BLD AUTO: 48.5 % (ref 37.5–51)
HGB BLD-MCNC: 15.6 G/DL (ref 13–17.7)
MCH RBC QN AUTO: 26.5 PG (ref 26.6–33)
MCHC RBC AUTO-ENTMCNC: 32.2 G/DL (ref 31.5–35.7)
MCV RBC AUTO: 82.5 FL (ref 79–97)
PLATELET # BLD AUTO: 225 10*3/MM3 (ref 140–450)
PMV BLD AUTO: 11.1 FL (ref 6–12)
POTASSIUM SERPL-SCNC: 4.1 MMOL/L (ref 3.5–5.2)
QT INTERVAL: 407 MS
QT INTERVAL: 491 MS
QT INTERVAL: 499 MS
RBC # BLD AUTO: 5.88 10*6/MM3 (ref 4.14–5.8)
SODIUM SERPL-SCNC: 136 MMOL/L (ref 136–145)
WBC NRBC COR # BLD: 12.17 10*3/MM3 (ref 3.4–10.8)

## 2022-05-03 PROCEDURE — 93005 ELECTROCARDIOGRAM TRACING: CPT | Performed by: INTERNAL MEDICINE

## 2022-05-03 PROCEDURE — 93010 ELECTROCARDIOGRAM REPORT: CPT | Performed by: INTERNAL MEDICINE

## 2022-05-03 PROCEDURE — 82962 GLUCOSE BLOOD TEST: CPT

## 2022-05-03 PROCEDURE — 63710000001 INSULIN LISPRO (HUMAN) PER 5 UNITS: Performed by: NURSE PRACTITIONER

## 2022-05-03 PROCEDURE — 99222 1ST HOSP IP/OBS MODERATE 55: CPT | Performed by: NURSE PRACTITIONER

## 2022-05-03 PROCEDURE — 85027 COMPLETE CBC AUTOMATED: CPT | Performed by: INTERNAL MEDICINE

## 2022-05-03 PROCEDURE — 80048 BASIC METABOLIC PNL TOTAL CA: CPT | Performed by: NURSE PRACTITIONER

## 2022-05-03 PROCEDURE — 93005 ELECTROCARDIOGRAM TRACING: CPT | Performed by: NURSE PRACTITIONER

## 2022-05-03 RX ORDER — DIAZEPAM 5 MG/ML
5 INJECTION, SOLUTION INTRAMUSCULAR; INTRAVENOUS ONCE
Status: COMPLETED | OUTPATIENT
Start: 2022-05-03 | End: 2022-05-04

## 2022-05-03 RX ADMIN — CITALOPRAM 40 MG: 40 TABLET, FILM COATED ORAL at 08:39

## 2022-05-03 RX ADMIN — APIXABAN 5 MG: 5 TABLET, FILM COATED ORAL at 08:39

## 2022-05-03 RX ADMIN — INSULIN LISPRO 2 UNITS: 100 INJECTION, SOLUTION INTRAVENOUS; SUBCUTANEOUS at 11:42

## 2022-05-03 RX ADMIN — INSULIN LISPRO 2 UNITS: 100 INJECTION, SOLUTION INTRAVENOUS; SUBCUTANEOUS at 06:30

## 2022-05-03 RX ADMIN — APIXABAN 5 MG: 5 TABLET, FILM COATED ORAL at 21:00

## 2022-05-03 RX ADMIN — SOTALOL HYDROCHLORIDE 160 MG: 80 TABLET ORAL at 16:22

## 2022-05-03 RX ADMIN — EMPAGLIFLOZIN 25 MG: 25 TABLET, FILM COATED ORAL at 08:39

## 2022-05-03 RX ADMIN — SOTALOL HYDROCHLORIDE 160 MG: 80 TABLET ORAL at 03:56

## 2022-05-03 RX ADMIN — ATORVASTATIN CALCIUM 40 MG: 20 TABLET, FILM COATED ORAL at 08:39

## 2022-05-03 RX ADMIN — LISINOPRIL 20 MG: 20 TABLET ORAL at 08:39

## 2022-05-03 NOTE — PAYOR COMM NOTE
"Santiago Zamora (47 y.o. Male)                     ATTENTION;     INITIAL CLINICALS FOR REVIEW , AUTH [id number]                  REPLY TO [facility] DEPT  [phone] OR CALL  ALBA PHAM LPN                   EP CONSULT PENDING 5/3 - WILL FORWARD ONCE AVAILABLE.                Date of Birth   1975    Social Security Number       Address   [address] [place] [address]    Home Phone   [phone]    MRN   [id number]       Rastafarian   None    Marital Status                               Admission Date   5/2/22    Admission Type   Elective    Admitting Provider   Stanley Ramirez MD    Attending Provider   Stanley Ramirez MD    [address], [address]       Discharge Date       Discharge Disposition       Discharge Destination                               Attending Provider: Stanley Ramirez MD    Allergies: Eggs Or Egg-derived Products, Fish-derived Products    Isolation: None   Infection: None   Code Status: CPR   Advance Care Planning Activity    Ht: 185.4 cm (73\")   Wt: 123 kg (272 lb)    Admission Cmt: None   Principal Problem: None                Active Insurance as of 5/2/2022     Primary Coverage     Payor Plan Insurance Group Employer/Plan Group    ANTHEM BLUE CROSS ANTHEM BLUE CROSS BLUE SHIELD PPO [id number]     Payor Plan Address Payor Plan Phone Number Payor Plan Fax Number Effective Dates    [address] [phone]  6/1/2020 - None Entered    [facility] [id number]       Subscriber Name Subscriber Birth Date Member ID       TANIA ZAMORA 4/23/1983 [id number]                 Emergency Contacts      (Rel.) Home Phone Work Phone Mobile Phone    Tania Zamora (Spouse) -- -- [phone]    MALOUDEBRA (Relative) -- -- [phone]               History & Physical      Stanley Ramirez MD at 05/02/22 1100        He had VT on his monitor and is referred for left heart cath he has diabetes. H&P reviewed. The " patient was examined and there are no changes to the H&P. I have explained the risks and benefits of the procedure to the patient.  The patient understands and agrees to proceed         Electronically signed by Stanley Ramirez MD at 05/02/22 1102   Source Note             LOS: 0 days   Patient Care Team:  Quinn Stoner DO as PCP - General (Family Medicine)  Quinn Stoner DO    Chief Complaint:     F/u a fib    Interval History:     He denies chest pain or pressure.  He has no difficulty breathing.  Does not feels heart racing or skipping.  He has no dizziness.      Objective   Vital Signs  Temp:  [97.4 °F (36.3 °C)-98.3 °F (36.8 °C)] 98.3 °F (36.8 °C)  Heart Rate:  [63-82] 67  Resp:  [14-18] 14  BP: (111-143)/(68-95) 127/79    Intake/Output Summary (Last 24 hours) at 4/15/2022 0741  Last data filed at 4/14/2022 1605  Gross per 24 hour   Intake 934.51 ml   Output --   Net 934.51 ml       Comfortable NAD  Neck supple, no JVD or thyromegaly appreciated  S1/S2 RRR, no m/r/g  Lungs CTA B, normal effort  Abdomen S/NT/ND (+) BS, no HSM appreciated  Extremities warm, no clubbing, cyanosis, or edema  No visible or palpable skin lesions  A/Ox4, mood and affect appropriate    Results Review:      Results from last 7 days   Lab Units 04/15/22  0615 04/14/22  0610 04/13/22  1628   SODIUM mmol/L 137 134* 137   POTASSIUM mmol/L 4.1 4.4 3.9   CHLORIDE mmol/L 104 100 101   CO2 mmol/L 25.1 23.5 21.1*   BUN mg/dL 15 18 17   CREATININE mg/dL 0.79 0.83 0.74*   GLUCOSE mg/dL 119* 142* 214*   CALCIUM mg/dL 7.6* 9.3 8.5*     Results from last 7 days   Lab Units 04/13/22  1628 04/13/22  0856   TROPONIN T ng/mL <0.010 <0.010     Results from last 7 days   Lab Units 04/13/22  0856   WBC 10*3/mm3 9.34   HEMOGLOBIN g/dL 17.3   HEMATOCRIT % 54.4*   PLATELETS 10*3/mm3 290             Results from last 7 days   Lab Units 04/13/22  0856   MAGNESIUM mg/dL 2.2           I reviewed the patient's new clinical results.  I personally viewed and  interpreted the patient's EKG/Telemetry data        Medication Review:   apixaban, 5 mg, Oral, Q12H  atorvastatin, 40 mg, Oral, Daily  dilTIAZem CD, 240 mg, Oral, Q24H  insulin aspart, 0-9 Units, Subcutaneous, TID AC  metoprolol tartrate, 12.5 mg, Oral, Q12H  sodium chloride, 10 mL, Intravenous, Q12H             Assessment/Plan       Paroxysmal atrial fibrillation (HCC)        1.  Atrial fibrillation - CHADS2-VASc is 2.  He was started on amiodarone drip and converted back to NSR.  On diltiazem at 240 mg daily and added metoprolol tartrate 12.5 mg BID.  On OAC with Eliquis 5 mg BID  2. Episodic Vtach - nonsustained.   3.  HTN - well controlled  4. DM - per primry  5,  Hyperlipidemia - continue statin therapy  6.  History of cardiomyopathy - resolved, EF now 57% from echo 2022  7.  Obesity - he used to weight ~400 lbs but has lost over 100 pounds  8.  VIDHYA - untreated, could not tolerate the CPAP.  He has been referred back to sleep medicine as he may be a candidate for Inspire    He has an appointment on May 12 with Neftaly and has an appointment in May with the sleep medicine service.  Would discharge today on metoprolol, diltiazem and lisinopril.  Would discontinue spironolactone.  Advised him to call us should his heart race, dyspnea increase or have weight increase of edema.  Otherwise, I think you go home today.    Gricelda Woods MD  04/15/22  07:41 EDT        Electronically signed by Gricelda Woods MD at 04/15/22 0851                     [facility] CATH LAB  [address] [facility] [address] [phone]              Santiago Zamora  Cardiac Catheterization/Vascular Study  Order# [id number]  Reading physician: Stanley Ramirez MD Ordering physician: Gricelda Woods MD Study date: 22      Procedures    Coronary angiography   Left ventriculography   Left Heart Cath        Patient Information    Patient Name   Santiago Zamora MRN   [id number] Legal Sex   Male  (Age)    1975 (47 y.o.)   Race Ethnicity Encounter Category   White or  Not  or  Elective       PACS Images     Show images for Cardiac Catheterization/Vascular Study   Printable Vessel Diagram    Printable Vessel Diagram        Physicians    Panel Physicians Referring Physician Case Authorizing Physician   Stanley Ramirez MD (Primary)  Gricelda Woods MD     Indications    Atrial fibrillation with RVR (HCC) [I48.91 (ICD-10-CM)]   VT (ventricular tachycardia) (HCC) [I47.2 (ICD-10-CM)]         Conclusion    CARDIAC CATHETERIZATION REPORT     Procedure:Left heart catheterization      DATE OF PROCEDURE: 05/02/22     PROCEDURE PERFORMED BY: Stanley Ramirez MD, [facility]     INDICATION FOR PROCEDURE: Ventricular tachycardia  DESCRIPTION OF PROCEDURE: After consent was obtained, access was gained in his right radial artery using a micropuncture technique. A 5-Grenadian short sheath was placed without difficulty.  Intraarterial cocktail was given.  Left ventriculography was performed followed by selective injection of the left and right coronary arteries were performed using a JL-3.5 and JR-4 diagnostic catheter.  Patient tolerated the procedure well without early complication and EBL was minimal.  At the conclusion, the sheath was removed and hemostasis was obtained. The patient went to the prep holding area in stable condition.     FINDINGS:     LEFT VENTRICULOGRAPHY: The LV pressure was 138/12.  There was normal LV systolic function without segmental wall motion abnormality.  There was no mitral insufficiency or gradient across the aortic valve on pullback.     CORONARY ANGIOGRAPHY:  Normal coronary arteries in a right dominant fashion     SUMMARY: Normal left heart cath     RECOMMENDATIONS: Arrhythmia service to evaluate ventricular tachycardia will admit to the hospital for this     Stanley Ramirez MD  05/02/22  12:53 EDT         Time Under Physician Observation    Name Panel Role Time Period  "  Stanley Ramirez MD Panel 1 Primary 5/2/2022 1218 - 5/2/2022 1235      Total Sedation Time    Moderate sedation event time was not documented.       Vessel Access    A 6 fr sheath was successfully inserted into the right radial artery.       Sheath Disposition    Hemostasis started on the right radial artery. Manual pressure applied to vessel. Manual pressure was held by RuffraRTR. Manual pressure was held for 5 min. Hemostasis achieved successfully. Closure device additional comment: 4x4 and tensoplast tape        Radiation     Event Details User   12:35 PM Radiation Tracking Panel 1: Stanley Ramirez MD   Cumulative Air Kerma (mGy) = 391.000; Fluoro Time (min) = 5.8; DAP (mGy-cm2) = 42822.000 MA        Total Contrast    Medication Name Total Dose   iopamidol (ISOVUE-370) 76 % injection 68 mL       Patient Hx Of Height, Weight, and Vitals    Height Weight BSA (Calculated - sq m) BMI (Calculated) Retired BMI (kg/m2) Pulse BP   185.4 cm (73\") 123 kg (272 lb) 2.45 sq meters 35.9  86 141/91     Admission Information    Admission Date/Time Discharge Date/Time Room/Bed   05/02/22  09:56 AM  2206/1     Medications  (Filter: Administrations occurring from 0000 to 1254 on 05/02/22)   important  Continuous medications are totaled by the amount administered until 05/02/22 1254.       fentaNYL citrate (PF) (SUBLIMAZE) injection (mcg)  Total dose:  50 mcg    Date/Time Rate/Dose/Volume Action    05/02/22 1211 50 mcg Given      midazolam (VERSED) injection (mg)  Total dose:  2 mg    Date/Time Rate/Dose/Volume Action    05/02/22 1211 1 mg Given    1219 1 mg Given      lidocaine (XYLOCAINE) 2% injection (mL)  Total volume:  3 mL    Date/Time Rate/Dose/Volume Action    05/02/22 1219 3 mL Given      iopamidol (ISOVUE-370) 76 % injection (mL)  Total volume:  68 mL    Date/Time Rate/Dose/Volume Action    05/02/22 1237 68 mL Given      sodium chloride 0.9 % infusion (mL/hr)  Total volume:  168.75 mL Dosing weight:  127    Date/Time " Rate/Dose/Volume Action    05/02/22 1039 75 mL/hr New Bag      Supplies    Name ID Temporary Type Charge Code Description Charge Code Quantity   KT MANIFLD CARDIAC 302 No Supply HC OR SUPPLY SHELL 272/NO CPT 040144 1   INTRO GLIDESHEATH .021 [id number] 92265 No Sheath HC OR SUPPLY SHELL 272/ 329862 1   CATH DIAG IMPULSE FR4 5F 100CM 41619 No Diagnostic Catheter HC OR SUPPLY SHELL 272/NO CPT 482625 1   CATH DIAG IMPULSE FL3.5 5F 100CM 73998 No Diagnostic Catheter HC OR SUPPLY SHELL 272/NO CPT 781576 1   CATH DIAG INFINITI QUICKCARE MICROLP QAP086G 1L197NB 40822 No Diagnostic Catheter HC OR SUPPLY SHELL 272/NO CPT 831191 1   GW EMR FIX EXCHG J STD .035 3MM 260CM 12518 No Guidewire HC OR SUPPLY SHELL 272/ 947543 1   GW HITORQUE/BAL MID/WT J W/HCOAT .014 [id number] 01029 No Guidewire HC OR SUPPLY SHELL 272/ 090052 1   PK CATH CARD 40 84542 No Supply   1     Signed    Electronically signed by Stanley Ramirez MD on 5/2/22 at 1254 EDT      Link to Procedure Log    Procedure Log        Order-Level Documents:             Vital Signs (last day)     Date/Time Temp Temp src Pulse Resp BP Patient Position SpO2    05/03/22 1127 98.4 (36.9) Oral 56 18 122/78 Sitting 96    05/03/22 1100 98.2 (36.8) Oral 52 16 127/63 -- 93    05/03/22 0839 -- -- 61 -- 133/78 -- --    05/03/22 0725 97.5 (36.4) Oral 55 18 111/66 Sitting 97    05/03/22 0354 97.7 (36.5) Oral -- 18 150/80 Lying --    05/02/22 2300 97.6 (36.4) Oral 53 18 126/84 Lying 96    05/02/22 1900 97.9 (36.6) Oral -- 18 -- Lying --    05/02/22 1235 -- -- -- 18 -- -- --    05/02/22 1225 -- -- -- 16 -- -- --    05/02/22 12:22:18 -- -- -- -- -- -- 91    05/02/22 1220 -- -- -- 18 -- -- --    05/02/22 1215 -- -- -- 18 -- -- --    05/02/22 1210 -- -- -- 16 -- -- --    05/02/22 1205 -- -- -- 18 -- -- --    05/02/22 1031 97.5 (36.4) Temporal 86 20 141/91 Lying 97          Oxygen Therapy (last day)     Date/Time SpO2 Device (Oxygen Therapy) Flow (L/min) Oxygen Concentration (%)  ETCO2 (mmHg)    05/03/22 1430 -- room air -- -- --    05/03/22 1127 96 room air -- -- --    05/03/22 1100 93 -- -- -- --    05/03/22 0800 -- room air -- -- --    05/03/22 0725 97 room air -- -- --    05/03/22 0419 -- room air -- -- --    05/03/22 0354 -- room air -- -- --    05/03/22 0011 -- room air -- -- --    05/02/22 2300 96 room air -- -- --    05/02/22 2000 -- room air 2 -- --    05/02/22 1900 -- room air -- -- --    05/02/22 1600 -- room air -- -- --    05/02/22 1315 -- room air -- -- --    05/02/22 12:22:18 91 nasal cannula with ETCO2 2 -- --    05/02/22 1031 97 -- -- -- --            Facility-Administered Medications as of 5/3/2022   Medication Dose Route Frequency Provider Last Rate Last Admin   • acetaminophen (TYLENOL) tablet 650 mg  650 mg Oral Q4H PRN Stanley Ramirez MD       • apixaban (ELIQUIS) tablet 5 mg  5 mg Oral Q12H Stanley Ramirez MD   5 mg at 05/03/22 0839   • atorvastatin (LIPITOR) tablet 40 mg  40 mg Oral Daily Stanley Ramirez MD   40 mg at 05/03/22 0839   • citalopram (CeleXA) tablet 40 mg  40 mg Oral Daily Stanley Ramirez MD   40 mg at 05/03/22 0839   • cyclobenzaprine (FLEXERIL) tablet 10 mg  10 mg Oral TID PRN Stanley Ramirez MD       • dextrose (D50W) (25 g/50 mL) IV injection 25 g  25 g Intravenous Q15 Min PRN Nuha Sorto APRN       • dextrose (GLUTOSE) oral gel 15 g  15 g Oral Q15 Min PRN Nuha Sorto APRN       • diazePAM (VALIUM) injection 5 mg  5 mg Intravenous Once Jayme Bartlett MD       • empagliflozin (JARDIANCE) tablet 25 mg  25 mg Oral Daily Stanley Ramirez MD   25 mg at 05/03/22 0839   • gadobenate dimeglumine (MULTIHANCE) injection 20 mL  20 mL Intravenous Once in imaging Stanley Ramirez MD       • glucagon (human recombinant) (GLUCAGEN DIAGNOSTIC) injection 1 mg  1 mg Intramuscular Q15 Min PRN Nuha Sorto APRN       • insulin lispro (ADMELOG) injection 0-9 Units  0-9 Units Subcutaneous TID AC Nuha Sorto APRN   2 Units at 05/03/22 1142   • lisinopril  (PRINIVIL,ZESTRIL) tablet 20 mg  20 mg Oral Daily Stanley Ramirez MD   20 mg at 05/03/22 0839   • nitroglycerin (NITROSTAT) SL tablet 0.4 mg  0.4 mg Sublingual Q5 Min PRN Stanley Ramirez MD       • Pharmacy Consult   Does not apply Once PRN Nuha Sorto APRN       • potassium chloride (K-DUR,KLOR-CON) ER tablet 40 mEq  40 mEq Oral PRN Nuha Sorto APRN        Or   • potassium chloride (KLOR-CON) packet 40 mEq  40 mEq Oral PRN Nuha Sorto APRN       • sodium chloride 0.9 % infusion  75 mL/hr Intravenous Continuous Stanley Ramirez MD 75 mL/hr at 05/02/22 1039 75 mL/hr at 05/02/22 1039   • sodium chloride 0.9 % infusion  100 mL/hr Intravenous Continuous Stanley Ramirez  mL/hr at 05/02/22 1404 100 mL/hr at 05/02/22 1404   • sotalol (BETAPACE) tablet 160 mg  160 mg Oral Q12H Nuha Sorto APRN   160 mg at 05/03/22 0356     Orders (last 7 days)      Start     Ordered    05/03/22 1430  gadobenate dimeglumine (MULTIHANCE) injection 20 mL  Once in Imaging         05/03/22 1331    05/03/22 1345  diazePAM (VALIUM) injection 5 mg  Once         05/03/22 1251    05/03/22 1032  POC Glucose Once  PROCEDURE ONCE         05/03/22 1029    05/03/22 0927  Code Status and Medical Interventions:  Continuous         05/03/22 0926    05/03/22 0700  ECG 12 Lead  Once         05/02/22 1252    05/03/22 0606  POC Glucose Once  PROCEDURE ONCE         05/03/22 0559    05/03/22 0600  CBC (No Diff)  Morning Draw         05/02/22 1252    05/03/22 0600  Basic Metabolic Panel  Morning Draw,   Status:  Canceled         05/02/22 1252    05/03/22 0600  ECG 12 Lead  Daily       05/02/22 1339    05/03/22 0600  Basic Metabolic Panel  Daily       05/02/22 1339    05/03/22 0600  Potassium  Daily       05/02/22 1339    05/02/22 2050  POC Glucose Once  PROCEDURE ONCE         05/02/22 2034 05/02/22 1730  insulin lispro (ADMELOG) injection 0-9 Units  3 Times Daily Before Meals         05/02/22 1419    05/02/22 1730  ECG 12 Lead  Once          05/02/22 1512    05/02/22 1700  POC Glucose TID AC  3 Times Daily Before Meals       05/02/22 1419    05/02/22 1600  Vital Signs  Every 8 Hours         05/02/22 1339    05/02/22 1536  POC Glucose Once  PROCEDURE ONCE         05/02/22 1533    05/02/22 1500  citalopram (CeleXA) tablet 40 mg  Daily         05/02/22 1252    05/02/22 1500  lisinopril (PRINIVIL,ZESTRIL) tablet 20 mg  Daily         05/02/22 1252    05/02/22 1500  empagliflozin (JARDIANCE) tablet 25 mg  Daily         05/02/22 1252    05/02/22 1500  dilTIAZem CD (CARDIZEM CD) 24 hr capsule 240 mg  Every 24 Hours Scheduled,   Status:  Discontinued         05/02/22 1252    05/02/22 1430  sotalol (BETAPACE) tablet 160 mg  Every 12 Hours         05/02/22 1339    05/02/22 1418  Do NOT Hold Basal or Correction Scale Insulin When Patient is NPO, Hold Scheduled Mealtime (Bolus) Insulin if NPO  Continuous         05/02/22 1419    05/02/22 1418  Follow [facility] Hypoglycemia Standing Orders For Blood Glucose Less Than 70 mg/dL  Until Discontinued        Comments: ALERT PATIENT - NOT NPO & CAN SAFELY SWALLOW  Administer 4 oz Fruit Juice OR 4 oz Regular Soda OR 8 oz Milk OR 15-30 grams (1 tube) of Glucose Gel.  Recheck Blood Glucose Approximately 15 Minutes After Ingestion, Repeat Treatment & Continue to Recheck Blood Sugar Approximately Every 15 Minutes Until Blood Glucose is 70 or Higher.  Once Blood Glucose is 70 or Higher & if It Will Be More Than 60 Minutes Until Next Meal, Provide Appropriate Snack (Including Carbohydrate Food) Based on Meal Plan Orde maggie. Give Meal Tray As Soon As Possible.    PATIENT HAS IV ACCESS - UNRESPONSIVE, NPO OR UNABLE TO SAFELY SWALLOW  Administer 25g (50ml) D50W IV Push.  Recheck Blood Glucose Approximately 15 Minutes After Administration, if Blood Glucose Remains Less Than 70, Repeat Treatment   Recheck Blood Glucose Approximately 15 Minutes After 2nd Administration, if Blood Glucose Remains Less Than 70 After 2nd Dose of D50W, Contact  "Provider for Further Treatment Orders & Consider Adding IVF With D5W for Mainte tenance    PATIENT WITHOUT IV ACCESS - UNRESPONSIVE, NPO OR UNABLE TO SAFELY SWALLOW  Administer 1mg Glucagon SQ & Establish IV Access.  Turn Patient on Side - Nausea / Vomiting May Occur.  Recheck Blood Glucose Approximately 15 Minutes After Administration.  If Blood Glucose Remains Less Than 70, Administer 25g D50W IV Push (50ml).  Recheck Blood Glucose Approximately 15 Minutes After Administration of D50W, if Blood Glucose Remains Less Than 70, Contact Provider for Further Treatment Orders & C  Consider Adding IVF With D5 for Maintenance    Document Event & Patient Response to Interventions in EMR, Document Medications on MAR  Notify Provider if Hypoglycemia Treatment Needed    05/02/22 1419    05/02/22 1417  dextrose (GLUTOSE) oral gel 15 g  Every 15 Minutes PRN         05/02/22 1419    05/02/22 1417  dextrose (D50W) (25 g/50 mL) IV injection 25 g  Every 15 Minutes PRN         05/02/22 1419    05/02/22 1417  glucagon (human recombinant) (GLUCAGEN DIAGNOSTIC) injection 1 mg  Every 15 Minutes PRN         05/02/22 1419    05/02/22 1338  potassium chloride (K-DUR,KLOR-CON) ER tablet 40 mEq  As Needed        \"Or\" Linked Group Details    05/02/22 1339    05/02/22 1338  potassium chloride (KLOR-CON) packet 40 mEq  As Needed        \"Or\" Linked Group Details    05/02/22 1339    05/02/22 1338  MRI Cardiac Function Complete With & Without Morphology  1 Time Imaging         05/02/22 1339    05/02/22 1338  Cardiac Monitoring  Continuous         05/02/22 1339    05/02/22 1338  Nurse to Order EKG For Acute Chest Pain or Dysrhythmias  Continuous         05/02/22 1339    05/02/22 1338  May Be Off Telemetry for Tests  Continuous         05/02/22 1339    05/02/22 1338  Inpatient Case Management  Consult  Once        Provider:  (Not yet assigned)    05/02/22 1339    05/02/22 1338  Notify Provider - Sotalol Parameters  Until Discontinued   "       05/02/22 1339    05/02/22 1338  Notify Provider at 0800 After Any Held Dose of Sotalol  Until Discontinued         05/02/22 1339    05/02/22 1338  Magnesium  Once         05/02/22 1339    05/02/22 1337  Pharmacy Consult  Once As Needed         05/02/22 1339    05/02/22 1333  Telemetry - Maintain IV Access  Continuous         05/02/22 1332    05/02/22 1333  Continuous Cardiac Monitoring  Continuous,   Status:  Canceled         05/02/22 1332    05/02/22 1333  May Be Off Telemetry for Tests  Continuous         05/02/22 1332    05/02/22 1333  ACLS Protocol For Life Threatening Dysrhythmias (Unless Code Status Indicates Otherwise)  Continuous         05/02/22 1332    05/02/22 1333  Notify Provider if ACLS Protocol Activated  Until Discontinued         05/02/22 1332    05/02/22 1332  nitroglycerin (NITROSTAT) SL tablet 0.4 mg  Every 5 Minutes PRN         05/02/22 1332    05/02/22 1254  atorvastatin (LIPITOR) tablet 40 mg  Daily         05/02/22 1252    05/02/22 1254  apixaban (ELIQUIS) tablet 5 mg  Every 12 Hours Scheduled         05/02/22 1252    05/02/22 1254  metoprolol tartrate (LOPRESSOR) tablet 12.5 mg  Every 12 Hours Scheduled,   Status:  Discontinued         05/02/22 1252    05/02/22 1254  sodium chloride 0.9 % infusion  Continuous         05/02/22 1252    05/02/22 1253  ECG 12 Lead  Once         05/02/22 1253    05/02/22 1252  Cardiac Electrophysiologist Inpatient Consult  Once        Specialty:  Cardiac Electrophysiology  Provider:  Epi Sanders MD    05/02/22 1252    05/02/22 1251  Remove Radial Pressure Device / Dressing  Once         05/02/22 1252    05/02/22 1251  Vital Signs & Reverse Efrem's Test (While Radial Compression Device in Place)  Per Order Details        Comments: Every 15 Minutes x4, Every 30 Minutes x4, & Every 1 Hour x2    05/02/22 1252    05/02/22 1251  Keep Affected Arm Straight & Elevated  Continuous         05/02/22 1252    05/02/22 1251  Activity As Tolerated  Until Discontinued          05/02/22 1252    05/02/22 1250  Diet Regular; Consistent Carbohydrate  Diet Effective Now         05/02/22 1252    05/02/22 1247  Inpatient Admission  Once         05/02/22 1252    05/02/22 1247  Obtain STAT EKG during chest pain. Notify MD of any change in rhythm, ST segments or complaints of chest pain.  Until Discontinued         05/02/22 1252    05/02/22 1247  Encourage fluids  Until Discontinued         05/02/22 1252    05/02/22 1247  Advance Diet as Tolerated  Until Discontinued         05/02/22 1252    05/02/22 1246  acetaminophen (TYLENOL) tablet 650 mg  Every 4 Hours PRN         05/02/22 1252    05/02/22 1246  cyclobenzaprine (FLEXERIL) tablet 10 mg  3 Times Daily PRN         05/02/22 1252    05/02/22 1237  iopamidol (ISOVUE-370) 76 % injection  As Needed,   Status:  Discontinued         05/02/22 1238    05/02/22 1219  lidocaine (XYLOCAINE) 2% injection  As Needed,   Status:  Discontinued         05/02/22 1219    05/02/22 1211  midazolam (VERSED) injection  As Needed,   Status:  Discontinued         05/02/22 1211    05/02/22 1211  fentaNYL citrate (PF) (SUBLIMAZE) injection  As Needed,   Status:  Discontinued         05/02/22 1211    05/02/22 1042  POC Glucose Once  PROCEDURE ONCE         05/02/22 1037    05/02/22 1015  sodium chloride 0.9 % infusion  Continuous         05/02/22 1013    05/02/22 1015  sodium chloride 0.9 % flush 10 mL  Every 12 Hours Scheduled,   Status:  Discontinued         05/02/22 1013    05/02/22 1014  Efrem Test Prior to Procedure  Once,   Status:  Canceled         05/02/22 1013    05/02/22 1014  Obtain Informed Consent in Pre-Op  Once,   Status:  Canceled         05/02/22 1013    05/02/22 1014  Notify MD If Patient is Taking Any of the Listed Medications or If They Have A Contrast Allergy  Once,   Status:  Canceled        Comments: Glucophage (metformin)  Fortamet (metform)  Glumetza (metformin)  Riomet (metformin)  Avandamet (metformin/rosiglitazone)  Glucovance  (metformin/glyburide)  PrandiMet (metformin/repaglinide)  Metaglip (metformin/glipzide)  Janumet (metformin/sitaliptin)  Actoplus Met (metformin/pioglitazone)  Coumadin (wafarin)  Pradaxa (dabigatran)  Xarelto (rivaroxaban)    05/02/22 1013    05/02/22 1014  Notify MD  Continuous,   Status:  Canceled         05/02/22 1013    05/02/22 1014  Notify Provider  Until Discontinued,   Status:  Canceled         05/02/22 1013    05/02/22 1014  POC Glucose Once  Once,   Status:  Canceled         05/02/22 1013    05/02/22 1014  Insert Peripheral IV  Once,   Status:  Canceled         05/02/22 1013    05/02/22 1014  Saline Lock & Maintain IV Access  Continuous,   Status:  Canceled         05/02/22 1013    05/02/22 1013  sodium chloride 0.9 % flush 10 mL  As Needed,   Status:  Discontinued         05/02/22 1013    04/27/22 1021  Cardiac Catheterization/Vascular Study  Once         04/27/22 1020    Unscheduled  Oxygen Therapy- Nasal Cannula; 2 LPM; Titrate for SPO2: 90%, Greater Than or Equal To  Continuous PRN       05/02/22 1013    Unscheduled  Vital Signs  As Needed      Comments: Goal SBP .  If SBP < 90, see orders for neosynephrine.  If SBP > 150, see orders for IV cardene.    05/02/22 1252    Unscheduled  Up in Chair  As Needed      Comments: Up in chair 4 hours post sheath removal    05/02/22 1252    Unscheduled  Telemetry - Pulse Oximetry  Continuous PRN      Comments: If Patient Develops Unresponsiveness, Acute Dyspnea, Cyanosis or Suspected Hypoxemia Start Continuous Pulse Ox Monitoring, Apply Oxygen & Notify Provider    05/02/22 1332    Unscheduled  Oxygen Therapy- Nasal Cannula; Titrate for SPO2: 90% - 95%  Continuous PRN      Comments: If Patient Develops Unresponsiveness, Acute Dyspnea, Cyanosis or Suspected Hypoxemia Start Continuous Pulse Ox Monitoring, Apply Oxygen & Notify Provider    05/02/22 1332    Unscheduled  ECG 12 Lead  As Needed      Comments: Nurse to Release if Patient Expericences Acute Chest  Pain or Dysrhythmias    05/02/22 1332    Unscheduled  Potassium  As Needed      Comments: For Ventricular Arrhythmias      05/02/22 1332    Unscheduled  Magnesium  As Needed      Comments: For Ventricular Arrhythmias      05/02/22 1332    Unscheduled  Troponin  As Needed      Comments: For Chest Pain      05/02/22 1332    Unscheduled  Blood Gas, Arterial -  As Needed      Comments: Per O2 PolicyNotify Physician      05/02/22 1332    Unscheduled  Telemetry - Pulse Oximetry  Continuous PRN      Comments: If Patient Develops Unresponsiveness, Acute Dyspnea, Cyanosis or Suspected Hypoxemia Start Continuous Pulse Ox Monitoring, Apply Oxygen & Notify Provider    05/02/22 1339    Unscheduled  Potassium  As Needed      Comments: For Sudden Ventricular Dysrhythmias. Notify Provider.      05/02/22 1339    Unscheduled  Magnesium  As Needed      Comments: For Sudden Ventricular Dysrhythmias. Notify Provider.      05/02/22 1339    Unscheduled  Digoxin Level  As Needed      Comments: For Atrial Dysrhythmias.  Notify Provider.      05/02/22 1339    Unscheduled  ECG 12 Lead  As Needed      Comments: Nurse to Release 2-4 Hours After Each of the First 5 Doses. - Measure QT/QTc  to Verify QTc Less Than 500 msec    05/02/22 1339    --  SCANNED - TELEMETRY           05/02/22 0000    --  SCANNED - TELEMETRY           05/02/22 0000                   Physician Progress Notes       Nuha Sorto APRN at 05/02/22 1419        Reviewed case and EKG with Dr. Sanders who had previously spoken with Dr. Woods regarding this case---cors were normal with normal EF---will start sotalol 160 mg BID and he wants a cardiac MRI, orders placed. Will see patient tomorrow.     Electronically signed by Nuha Sorto APRN at 05/02/22 1420                Argentina Flynn RN    Registered Nurse   Cardiology   Plan of Care      Signed   Date of Service:  05/02/22 1712   Creation Time:  05/02/22 1712              Signed            Goal Outcome Evaluation:  Plan  of Care Reviewed With: patient, spouse  Outcome Evaluation: VSS upon arrival to unit. Pt is independent and ambulates with no assistance. Began sotalol admin and following proper follow-up protocols. No complaints of pain or discomfort at radial site, ace bandage removed and replaced with gauze and tegaderm. WCTM.                 Admission (Current) on 5/2/2022     Admission (Current) on 5/2/2022        Detailed Report        ROS Info      Note shared with patient        Additional Documentation    Vitals:    All medication doses during the admission are shown, including meds that are no longer on order.    Scheduled Meds Sorted by Name  for Santiago Zamora as of 5/1/22 through 5/3/22    1 Day 3 Days 7 Days 10 Days < Today >   Legend:                          Inactive     Active     Other Encounter     Linked                 Medications 05/01/22 05/02/22 05/03/22   apixaban (ELIQUIS) tablet 5 mg  Dose: 5 mg  Freq: Every 12 Hours Scheduled Route: PO  Indications of Use: ATRIAL FIBRILLATION  Start: 05/02/22 1254   Admin Instructions:   Tablet may be crushed and suspended in 60 mL of water or D5W and immediately delivered via NG tube.     1402   2040       0839   2100         atorvastatin (LIPITOR) tablet 40 mg  Dose: 40 mg  Freq: Daily Route: PO  Start: 05/02/22 1254   Admin Instructions:   Avoid grapefruit juice.     1402        0839          citalopram (CeleXA) tablet 40 mg  Dose: 40 mg  Freq: Daily Route: PO  Start: 05/02/22 1500   Admin Instructions:   Caution: Look alike/sound alike drug alert.     1506        0839          diazePAM (VALIUM) injection 5 mg  Dose: 5 mg  Freq: Once Route: IV  Start: 05/03/22 1345   Admin Instructions:   Premed for MRI, can repeat x1   May give each 5 mg IV push over 1 minute. May be injected through infusion tubing using port closest to vein insertion. Do not mix or dilute with other solutions.      (9896)          dilTIAZem CD (CARDIZEM CD) 24 hr capsule 240 mg  Dose: 240  mg  Freq: Every 24 Hours Scheduled Route: PO  Start: 05/02/22 1500 End: 05/02/22 1340   Admin Instructions:   Caution: Look alike/sound alike drug alert.   Swallow capsule whole. Do not crush, chew, or open capsule. Avoid grapefruit juice. Maximum simvastatin dose 10 mg while taking dilTIAZem.     1340-D/C'd         empagliflozin (JARDIANCE) tablet 25 mg  Dose: 25 mg  Freq: Daily Route: PO  Start: 05/02/22 1500     1605        0839          gadobenate dimeglumine (MULTIHANCE) injection 20 mL  Dose: 20 mL  Freq: Once in Imaging Route: IV  Start: 05/03/22 1430   Admin Instructions:   Vesicant; admin as rapid bolus; flush with 5 mL NS after admin or 20 mL for renal or aortoiliofemoral vasculature      (1455) [C]          insulin lispro (ADMELOG) injection 0-9 Units  Dose: 0-9 Units  Freq: 3 Times Daily Before Meals Route: SC  Start: 05/02/22 1730   Admin Instructions:   Correction - Moderate Dose.  40-60 units/day total insulin dose or average weight, on oral agents    Blood glucose 150-199 mg/dL - 2 units  Blood glucose 200-249 mg/dL - 4 units  Blood glucose 250-299 mg/dL - 6 units  Blood glucose 300-349 mg/dL - 7 units  Blood glucose 350-400 mg/dL - 8 units  Blood glucose greater than 400 mg/dL - 9 units and call provider   Caution: Look alike/sound alike drug alert     [id number]        lisinopril (PRINIVIL,ZESTRIL) tablet 20 mg  Dose: 20 mg  Freq: Daily Route: PO  Start: 05/02/22 1500     1506        0839          metoprolol tartrate (LOPRESSOR) tablet 12.5 mg  Dose: 12.5 mg  Freq: Every 12 Hours Scheduled Route: PO  Start: 05/02/22 1254 End: 05/02/22 1339     1339-D/C'd             sodium chloride 0.9 % flush 10 mL  Dose: 10 mL  Freq: Every 12 Hours Scheduled Route: IV  Start: 05/02/22 1015 End: 05/02/22 1309     1309-D/C'd             sotalol (BETAPACE) tablet 160 mg  Dose: 160 mg  Freq: Every 12 Hours Route: PO  Start: 05/02/22 1430   Admin Instructions:   Hold the dose and Notify Provider  (If after hours call in AM):  If baseline QTc > 450 msec  (if QRS duration > 100 msec) OR   QTc increases 15% or if > 520 msec after any subsequent Sotalol dose    Do not give within 2 hours of alumnium- or magnesium-containing antacids.              Per Black Box Warning, RN will release PRN EKG order for first 5 Sotalol doses for new orders or dose changes     1506        0356   1600         Medications 05/01/22 05/02/22 05/03/22           Continuous Meds Sorted by Name  for Santiago Zamora as of 5/1/22 through 5/3/22  Legend:                          Inactive     Active     Other Encounter     Linked                 Medications 05/01/22 05/02/22 05/03/22   sodium chloride 0.9 % infusion  Rate: 100 mL/hr Dose: 100 mL/hr  Freq: Continuous Route: IV  Last Dose: 100 mL/hr (05/02/22 1404)  Start: 05/02/22 1254     1404           sodium chloride 0.9 % infusion  Rate: 75 mL/hr Dose: 75 mL/hr  Freq: Continuous Route: IV  Last Dose: 75 mL/hr (05/02/22 1039)  Start: 05/02/22 1015     1039                   PRN Meds Sorted by Name  for Santiago Zamora as of 5/1/22 through 5/3/22  Legend:                          Inactive     Active     Other Encounter     Linked                 Medications 05/01/22 05/02/22 05/03/22   acetaminophen (TYLENOL) tablet 650 mg  Dose: 650 mg  Freq: Every 4 Hours PRN Route: PO  PRN Reasons: Mild Pain ,Fever  PRN Comment: temperature greater than 101F  Start: 05/02/22 1246   Admin Instructions:   Do not exceed 4 grams of acetaminophen in a 24 hr period. Max dose of 2gm for AST/ALT greater than 120 units/L    If given for fever, use fever parameter: fever greater than 100.4 °F.    If given for pain, use the following pain scale:   Mild Pain = Pain Score of 1-3, CPOT 1-2  Moderate Pain = Pain Score of 4-6, CPOT 3-4  Severe Pain = Pain Score of 7-10, CPOT 5-8         cyclobenzaprine (FLEXERIL) tablet 10 mg  Dose: 10 mg  Freq: 3 Times Daily PRN Route: PO  PRN Reason: Muscle Spasms  Start: 05/02/22 1246          dextrose (D50W) (25 g/50 mL) IV injection 25 g  Dose: 25 g  Freq: Every 15 Minutes PRN Route: IV  PRN Reason: Low Blood Sugar  PRN Comment: Blood Sugar Less Than 70  Start: 05/02/22 1417   Admin Instructions:   Blood sugar less than 70; patient has IV access - Unresponsive, NPO or Unable To Safely Swallow         dextrose (GLUTOSE) oral gel 15 g  Dose: 15 g  Freq: Every 15 Minutes PRN Route: PO  PRN Reason: Low Blood Sugar  PRN Comment: Blood sugar less than 70  Start: 05/02/22 1417   Admin Instructions:   BS<70, Patient Alert, Is not NPO, Can safely swallow.         fentaNYL citrate (PF) (SUBLIMAZE) injection  Freq: As Needed  Start: 05/02/22 1211 End: 05/02/22 1242     1211 [C]   1242-D/C'd        glucagon (human recombinant) (GLUCAGEN DIAGNOSTIC) injection 1 mg  Dose: 1 mg  Freq: Every 15 Minutes PRN Route: IM  PRN Reason: Low Blood Sugar  PRN Comment: Blood Glucose Less Than 70  Start: 05/02/22 1417   Admin Instructions:   Blood Glucose Less Than 70 - Patient Without IV Access - Unresponsive, NPO or Unable To Safely Swallow         iopamidol (ISOVUE-370) 76 % injection  Freq: As Needed  Start: 05/02/22 1237 End: 05/02/22 1242     1237   1242-D/C'd        lidocaine (XYLOCAINE) 2% injection  Freq: As Needed  Start: 05/02/22 1219 End: 05/02/22 1242     1219   1242-D/C'd        midazolam (VERSED) injection  Freq: As Needed  Start: 05/02/22 1211 End: 05/02/22 1242     1211 [C]   1219 [C]   1242-D/C'd       nitroglycerin (NITROSTAT) SL tablet 0.4 mg  Dose: 0.4 mg  Freq: Every 5 Minutes PRN Route: SL  PRN Reason: Chest Pain  PRN Comment: Only if SBP Greater Than 100  Start: 05/02/22 1332   Admin Instructions:   If Pain Unrelieved After 3 Doses Notify MD         Pharmacy Consult  Freq: Once As Needed Route: XX  PRN Reason: Consult  Start: 05/02/22 1337   Order specific questions:   Consult for Sotalol - Pharmacy to Review Drug Interactions & Creatinine Clearance Prior to First Dose-please do not change dose  without talking with ordering provider         potassium chloride (K-DUR,KLOR-CON) ER tablet 40 mEq  Dose: 40 mEq  Freq: As Needed Route: PO  PRN Comment: Potassium replacement. See administration instructions  Start: 05/02/22 1338   Admin Instructions:   Potassium replacement    Oral (may give tablet or powder packet)  If K+ less than or equal to 3.1 give KCl 40 mEq q4h x 3 doses  If K+ 3.2-3.6 give KCl 40 mEq q4h x 2 doses    Peripheral IV  If K+ less than or equal to 3.1 give KCl 10 mEq/100 mL NS IV q1h x 6 doses  If K+ 3.2-3.6 give KCl 10 mEq/100 mL NS q1h x 4 doses    Central Line  If K+ less than or equal to 3.1 give KCl 20 mEq/50 mL NS IV q1h x 3 doses  If K+ 3.2-3.6 give KCl 20 mEq/50 mL NS q1h x 2 doses    Check potassium 4 hours after last dose given.  Check magnesium if K stays low after replacement.  DO NOT GIVE if CrCl is less than 30 mL/minute or urine output is less than 30 mL/hr  Swallow whole; do not crush, split, or chew.         Or  potassium chloride (KLOR-CON) packet 40 mEq  Dose: 40 mEq  Freq: As Needed Route: PO  PRN Comment: potassium replacement, see admin instructions  Start: 05/02/22 1338   Admin Instructions:   Potassium replacement    Oral (may give tablet or powder packet)  If K+ less than or equal to 3.1 give KCl 40 mEq q4h x 3 doses  If K+ 3.2-3.6 give KCl 40 mEq q4h x 2 doses    Peripheral IV  If K+ less than or equal to 3.1 give KCl 10 mEq/100 mL NS IV q1h x 6 doses  If K+ 3.2-3.6 give KCl 10 mEq/100 mL NS q1h x 4 doses    Central Line  If K+ less than or equal to 3.1 give KCl 20 mEq/50 mL NS IV q1h x 3 doses  If K+ 3.2-3.6 give KCl 20 mEq/50 mL NS q1h x 2 doses    Check potassium 4 hours after last dose given.  Check magnesium if K stays low after replacement.  DO NOT GIVE if CrCl is less than 30 mL/minute or urine output is less than 30 mL/hr         sodium chloride 0.9 % flush 10 mL  Dose: 10 mL  Freq: As Needed Route: IV  PRN Reason: Line Care  Start: 05/02/22 1013 End: 05/02/22  "1309     1309-D/C'd         Medications 05/01/22 05/02/22 05/03/22           /78 (BP Location: Right arm, Patient Position: Sitting)   Pulse 56   Temp 98.4 °F (36.9 °C) (Oral)   Resp 18   Ht 185.4 cm (73\")   Wt 123 kg (272 lb)   SpO2 96%   BMI 35.89 kg/m²   BSA 2.45 m²      More Vitals      "

## 2022-05-03 NOTE — PLAN OF CARE
Goal Outcome Evaluation:  Plan of Care Reviewed With: patient        Progress: no change  Outcome Evaluation: VSS on RA. AOx4. No c/o pain or discomfort. Attempted to get MRI however valium did not come up from pharmacy in time to pre-medicate pt, so pt only got through part of the MRI. No new complaints at this time, WCTM.

## 2022-05-03 NOTE — PLAN OF CARE
Goal Outcome Evaluation:  Plan of Care Reviewed With: patient           Outcome Evaluation: patient denies pain nor discomfort, vital signs stable, sotalol given,right wrist dry and intact, no swellon, no bleeding , EKG due at 6am cont to monitor

## 2022-05-03 NOTE — CONSULTS
Electrophysiology Hospital Consult            Patient Name: Santiago Zamora  Age/Sex: 47 y.o. male  : 1975  MRN: [id number]    Date of Admission: 2022  Date of Encounter Visit: 22  Encounter Provider: KIAN Beckman  Referring Provider: Stanley Ramirez MD  Place of Service: [facility] CARDIOLOGY  Patient Care Team:  Quinn Stoner DO as PCP - General (Family Medicine)  Quinn Stoner DO      Subjective:   EP Consultation for: NSVT, PAF, abnormal EKG    Chief Complaint: Heart fluttering, elective cath yesterday, admitted for sotalol initiation    History of Present Illness:  Santiago Zamora is a 47 y.o. male patient with diabetes, HTN, HLD, anxiety, atrial fibrillation and remote history of CHF (reported prior EF of 10%--15 yrs ago in the setting of heavy alcohol use which he has quit completely,) who initially presented to [facility]  with complaints of heart fluttering associated with lightheadedness and dyspnea.    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     He does have VIDHYA but is intolerant to CPAP.     He had not seen cardiology in a long time and reportedly was released from them because his EF had improved.     He was noted to be in a WCT--thought to be afib w/LAFB, placed on diltiazem gtt, spontaneously converted to SR-EKG showed SR w/incomplete LBBB.      Echo during that admission showed normal EF--he was dc'd home with Zio, placed on ASA 81 mg, diltiazem and spironolactone.     Returned to ED  with chest pain and palpitations, found again to have AF w/RVR, treated initially with IV amiodarone, converted, transitioned to diltiazem and metoprolol added and started on apixaban. Discharged 4/15.     He has felt better since his most recent discharge.     Zio showed AF and NSVT---Dr. Woods reviewed with   Tommy, cath recommended and if okay then admit for sotalol initiation further work up/evaluation.     Cath yesterday showed normal coronaries and normal EF. He presented in SR and said the last couple of days have been good. Sotalol started yesterday afternoon. He has a significantly abnormal baseline EKG, cardiac MRI has been ordered.     Past Medical History:  Past Medical History:   Diagnosis Date   • Anxiety    • Cardiomyopathy (HCC)    • Depression    • Diabetes mellitus (HCC)    • Elevated cholesterol    • Hyperlipidemia    • Hypertension    • Varicose veins        Past Surgical History:   Procedure Laterality Date   • CARDIAC CATHETERIZATION     • CARDIAC CATHETERIZATION N/A 5/2/2022    Procedure: Coronary angiography;  Surgeon: Stanley Ramirez MD;  Location: [facility] CATH INVASIVE LOCATION;  Service: Cardiovascular;  Laterality: N/A;   • CARDIAC CATHETERIZATION N/A 5/2/2022    Procedure: Left Heart Cath;  Surgeon: Stanley Ramirez MD;  Location: [facility] CATH INVASIVE LOCATION;  Service: Cardiovascular;  Laterality: N/A;   • CARDIAC CATHETERIZATION N/A 5/2/2022    Procedure: Left ventriculography;  Surgeon: Stanley Ramirez MD;  Location: [facility] CATH INVASIVE LOCATION;  Service: Cardiovascular;  Laterality: N/A;   • CHOLECYSTECTOMY         Home Medications:   Medications Prior to Admission   Medication Sig Dispense Refill Last Dose   • atorvastatin (LIPITOR) 40 MG tablet Take 1 tablet by mouth Daily. 30 tablet 1 5/1/2022 at Unknown time   • citalopram (CeleXA) 40 MG tablet Take 1 tablet by mouth Daily. 90 tablet 1 5/1/2022 at Unknown time   • cyclobenzaprine (FLEXERIL) 10 MG tablet Take 1 tablet by mouth 3 (Three) Times a Day As Needed for Muscle Spasms. 90 tablet 0 5/1/2022 at Unknown time   • dilTIAZem CD (CARDIZEM CD) 240 MG 24 hr capsule Take 1 capsule by mouth Daily. 30 capsule 0 5/1/2022 at Unknown time   • diphenhydrAMINE (BENADRYL) 50 MG tablet 1 tablet 1 hour prior to procedure 1 tablet 0 5/2/2022  at Unknown time   • lisinopril (PRINIVIL,ZESTRIL) 20 MG tablet Take 1 tablet by mouth Daily. 90 tablet 1 5/1/2022 at Unknown time   • metoprolol tartrate (LOPRESSOR) 25 MG tablet Take 0.5 tablets by mouth Every 12 (Twelve) Hours. 60 tablet 0 5/1/2022 at Unknown time   • predniSONE (DELTASONE) 50 MG tablet 1 tablet 13 hours before procedure, 1 tablet 7 hours before procedure, 1 tablet 1 hour prior to procedure 3 tablet 0 5/2/2022 at Unknown time   • 1st Choice Lancets Super Thin misc USE TO TEST GLUCOSE DAILY 100 each 5    • apixaban (ELIQUIS) 5 MG tablet tablet Take 1 tablet by mouth Every 12 (Twelve) Hours. Indications: Atrial Fibrillation 60 tablet 0 4/29/2022   • empagliflozin (Jardiance) 25 MG tablet tablet Take 1 tablet by mouth Daily. 30 tablet 1 4/29/2022   • famotidine (PEPCID) 10 MG tablet Take 1 tablet by mouth 3 (Three) Times a Day. Take 1 1/2 tables at 9 am, 3 pm and 9 pm day prior to procedure 6 tablet 0    • glimepiride (Amaryl) 4 MG tablet Take 1 tablet by mouth Every Morning Before Breakfast. 30 tablet 1 4/29/2022   • glucose blood (Accu-Chek Rebecca) test strip Test blood sugar once daily as instructed 100 each 5    • glucose monitor monitoring kit Use accu-chek Rebecca glucometer to check your blood sugar once daily 1 each 0        Allergies:  Allergies   Allergen Reactions   • Eggs Or Egg-Derived Products Swelling   • Fish-Derived Products Swelling       Past Social History:  Social History     Socioeconomic History   • Marital status:    Tobacco Use   • Smoking status: Never Smoker   • Smokeless tobacco: Never Used   Substance and Sexual Activity   • Alcohol use: No   • Drug use: No   • Sexual activity: Defer       Past Family History:  Family History   Problem Relation Age of Onset   • Hypertension Mother    • Hypertension Father    • Atrial fibrillation Father    • Heart attack Father    • Diabetes Other        Review of Systems: All systems reviewed. Pertinent positives identified in HPI.  All other systems are negative.     14 point ROS was performed and is negative except as outlined in HPI.     Objective:     Objective:  Vital Signs (last 24 hours)       05/02 0700 05/03 0659 05/03 0700 05/03 0815   Most Recent      Temp (°F) 97.5 -  97.9      97.5     97.5 (36.4) 05/03 0725    Heart Rate 53 -  86      55     55 05/03 0725    Resp 16 -  20      18     18 05/03 0725    /84 -  150/80      111/66     111/66 05/03 0725    SpO2 (%) 91 -  97      97     97 05/03 0725        Temp:  [97.5 °F (36.4 °C)-97.9 °F (36.6 °C)] 97.5 °F (36.4 °C)  Heart Rate:  [53-86] 55  Resp:  [16-20] 18  BP: (111-150)/(66-91) 111/66  Body mass index is 35.89 kg/m².        Physical Exam:     General Appearance: No acute distress, well developed and well nourished.   Eyes: Conjunctiva and lids: No erythema, swelling, or discharge. Sclera non-icteric.   HENT: Atraumatic, normocephalic. External eyes, ears, and nose normal.   Respiratory: No signs of respiratory distress. Respiration rhythm and depth normal.   Clear to auscultation. No rales, crackles, rhonchi, or wheezing auscultated.   Cardiovascular:  Jugular Venous Pressure: Normal  Heart Rate and Rhythm: Normal, Heart Sounds: Normal S1 and S2. No S3 or S4 noted.  Murmurs: No murmurs noted. No rubs, thrills, or gallops.   Arterial Pulses: Posterior tibialis and dorsalis pedis pulses normal.   Lower Extremities: No edema noted.  Gastrointestinal:  Abdomen soft, non-distended, non-tender.  Musculoskeletal: Normal movement of extremities  Skin: Warm and dry.   Psychiatric: Patient alert and oriented to person, place, and time. Speech and behavior appropriate. Normal mood and affect.    Labs:   Lab Review:     Results from last 7 days   Lab Units 05/03/22  0259 04/29/22  1539   SODIUM mmol/L 136 136   POTASSIUM mmol/L 4.1 4.6   CHLORIDE mmol/L 103 98   CO2 mmol/L 22.0 24.9   BUN mg/dL 19 14   CREATININE mg/dL 0.78 1.05   GLUCOSE mg/dL 142* 98   CALCIUM mg/dL 9.1 10.0          Results from last 7 days   Lab Units 22  0259   WBC 10*3/mm3 12.17*   HEMOGLOBIN g/dL 15.6   HEMATOCRIT % 48.5   PLATELETS 10*3/mm3 225             Results from last 7 days   Lab Units 22  1739   MAGNESIUM mg/dL 2.2         Imagin/2/2022 Cath:    FINDINGS:     LEFT VENTRICULOGRAPHY: The LV pressure was 138/12.  There was normal LV systolic function without segmental wall motion abnormality.  There was no mitral insufficiency or gradient across the aortic valve on pullback.     CORONARY ANGIOGRAPHY:  Normal coronary arteries in a right dominant fashion     SUMMARY: Normal left heart cath     RECOMMENDATIONS: Arrhythmia service to evaluate ventricular tachycardia will admit to the hospital for this     Stanley Ramirez MD  22  12:53 EDT     2022 Echo:    Interpretation Summary    · Left ventricular wall thickness is consistent with mild concentric hypertrophy.  · Calculated left ventricular EF = 57.8% Estimated left ventricular EF was in agreement with the calculated left ventricular EF. Left ventricular systolic function is normal.  · Estimated right ventricular systolic pressure from tricuspid regurgitation is normal (<35 mmHg).  · Left ventricular diastolic function was normal.     2022 Monitor:    Study Description    Monitor placed on patient by Kate Ahumada on 2022  at 10:36 EDT.  The monitor was scanned on 2022. The patient was monitored for 11 days 2 hours. Indications for this exam include unspecified atrial fibrillation. Average HR:  72. Min HR:  45. Max HR:  285.     Study Findings    Patient diary was not submitted. No symptoms reported during the monitoring period. No complications noted. The predominant rhythm noted during the testing period was sinus rhythm. Premature atrial contractions occured rarely. Evidence of atrial fibrillation was noted. There is a 3% burden of atrial fibrillation, with rates 72 to 177 bpm (average 114 bpm).  The longest episode  lasted 8.5 hours.. There were no episodes of supraventricular tachycardia. Premature ventricular contractions occured rarely. Ventricular trigeminy. Numerous (1129) episodes of regular, wide-complex tachycardia with a different QRS configuration than baseline were noted.  These are most consistent with ventricular tachycardia.  Some of these are sustained for up to a minute to 90 seconds.  These episodes are extremely fast, with rates as high as 280 bpm.  The patient's symptoms correlated with ventricular ectopy.. Sinoatrial node conduction was normal. No atrioventricular block noted.     Study Impressions    An abnormal monitor study.           Signed    Electronically signed by Jayme Bartlett MD on 4/26/22 at 1126 EDT         EKG:                 I personally viewed and interpreted the patient's EKG/Telemetry tracings.    Assessment:       Atrial fibrillation with RVR (HCC)    VT (ventricular tachycardia) (HCC)    Ventricular tachycardia (HCC)        Plan:     Dr. Sanders and I saw patient this afternoon. He is tolerating sotalol well and QTc is okay, continue 160 mg dose for now.     He went down for cardiac MRI this afternoon but was unable to complete due to anxiety, his nurse had called to get valium ordered pre MRI but he had already left the floor---he tried but was unable to complete, will try again later today or tomorrow.     Suspect that he has scar from his cardiomyopathy many years ago but will see what MRI shows.     Thank you for allowing me to participate in the care of Santiago Zamora. Feel free to contact me directly with any further questions or concerns.    KIAN Beckamn  [facility]  05/03/22  08:15 EDT

## 2022-05-04 ENCOUNTER — APPOINTMENT (OUTPATIENT)
Dept: MRI IMAGING | Facility: HOSPITAL | Age: 47
End: 2022-05-04

## 2022-05-04 LAB
ANION GAP SERPL CALCULATED.3IONS-SCNC: 9 MMOL/L (ref 5–15)
BUN SERPL-MCNC: 20 MG/DL (ref 6–20)
BUN/CREAT SERPL: 19.8 (ref 7–25)
CALCIUM SPEC-SCNC: 8.9 MG/DL (ref 8.6–10.5)
CHLORIDE SERPL-SCNC: 101 MMOL/L (ref 98–107)
CO2 SERPL-SCNC: 27 MMOL/L (ref 22–29)
CREAT SERPL-MCNC: 1.01 MG/DL (ref 0.76–1.27)
EGFRCR SERPLBLD CKD-EPI 2021: 93.5 ML/MIN/1.73
GLUCOSE BLDC GLUCOMTR-MCNC: 107 MG/DL (ref 70–130)
GLUCOSE BLDC GLUCOMTR-MCNC: 128 MG/DL (ref 70–130)
GLUCOSE BLDC GLUCOMTR-MCNC: 162 MG/DL (ref 70–130)
GLUCOSE BLDC GLUCOMTR-MCNC: 180 MG/DL (ref 70–130)
GLUCOSE SERPL-MCNC: 103 MG/DL (ref 65–99)
POTASSIUM SERPL-SCNC: 4.3 MMOL/L (ref 3.5–5.2)
QT INTERVAL: 447 MS
QT INTERVAL: 462 MS
QT INTERVAL: 477 MS
SODIUM SERPL-SCNC: 137 MMOL/L (ref 136–145)

## 2022-05-04 PROCEDURE — 82962 GLUCOSE BLOOD TEST: CPT

## 2022-05-04 PROCEDURE — A9577 INJ MULTIHANCE: HCPCS | Performed by: INTERNAL MEDICINE

## 2022-05-04 PROCEDURE — 0 GADOBENATE DIMEGLUMINE 529 MG/ML SOLUTION: Performed by: INTERNAL MEDICINE

## 2022-05-04 PROCEDURE — 25010000002 DIAZEPAM PER 5 MG: Performed by: INTERNAL MEDICINE

## 2022-05-04 PROCEDURE — 93005 ELECTROCARDIOGRAM TRACING: CPT | Performed by: INTERNAL MEDICINE

## 2022-05-04 PROCEDURE — 63710000001 INSULIN LISPRO (HUMAN) PER 5 UNITS: Performed by: NURSE PRACTITIONER

## 2022-05-04 PROCEDURE — 80048 BASIC METABOLIC PNL TOTAL CA: CPT | Performed by: NURSE PRACTITIONER

## 2022-05-04 PROCEDURE — 93010 ELECTROCARDIOGRAM REPORT: CPT | Performed by: INTERNAL MEDICINE

## 2022-05-04 PROCEDURE — 93005 ELECTROCARDIOGRAM TRACING: CPT | Performed by: NURSE PRACTITIONER

## 2022-05-04 PROCEDURE — 75561 CARDIAC MRI FOR MORPH W/DYE: CPT

## 2022-05-04 PROCEDURE — 75561 CARDIAC MRI FOR MORPH W/DYE: CPT | Performed by: INTERNAL MEDICINE

## 2022-05-04 PROCEDURE — 99232 SBSQ HOSP IP/OBS MODERATE 35: CPT | Performed by: NURSE PRACTITIONER

## 2022-05-04 RX ORDER — SOTALOL HYDROCHLORIDE 80 MG/1
120 TABLET ORAL EVERY 12 HOURS
Status: DISCONTINUED | OUTPATIENT
Start: 2022-05-04 | End: 2022-05-05 | Stop reason: HOSPADM

## 2022-05-04 RX ORDER — SOTALOL HYDROCHLORIDE 80 MG/1
120 TABLET ORAL EVERY 12 HOURS
Status: DISCONTINUED | OUTPATIENT
Start: 2022-05-04 | End: 2022-05-04

## 2022-05-04 RX ADMIN — CITALOPRAM 40 MG: 40 TABLET, FILM COATED ORAL at 09:20

## 2022-05-04 RX ADMIN — APIXABAN 5 MG: 5 TABLET, FILM COATED ORAL at 09:20

## 2022-05-04 RX ADMIN — APIXABAN 5 MG: 5 TABLET, FILM COATED ORAL at 20:09

## 2022-05-04 RX ADMIN — EMPAGLIFLOZIN 25 MG: 25 TABLET, FILM COATED ORAL at 09:20

## 2022-05-04 RX ADMIN — ATORVASTATIN CALCIUM 40 MG: 20 TABLET, FILM COATED ORAL at 20:09

## 2022-05-04 RX ADMIN — GADOBENATE DIMEGLUMINE 20 ML: 529 INJECTION, SOLUTION INTRAVENOUS at 13:14

## 2022-05-04 RX ADMIN — SOTALOL HYDROCHLORIDE 120 MG: 80 TABLET ORAL at 09:19

## 2022-05-04 RX ADMIN — GADOBENATE DIMEGLUMINE 20 ML: 529 INJECTION, SOLUTION INTRAVENOUS at 12:00

## 2022-05-04 RX ADMIN — INSULIN LISPRO 2 UNITS: 100 INJECTION, SOLUTION INTRAVENOUS; SUBCUTANEOUS at 16:33

## 2022-05-04 RX ADMIN — LISINOPRIL 20 MG: 20 TABLET ORAL at 09:20

## 2022-05-04 RX ADMIN — INSULIN LISPRO 2 UNITS: 100 INJECTION, SOLUTION INTRAVENOUS; SUBCUTANEOUS at 12:55

## 2022-05-04 RX ADMIN — DIAZEPAM 5 MG: 5 INJECTION INTRAMUSCULAR; INTRAVENOUS at 10:56

## 2022-05-04 RX ADMIN — SOTALOL HYDROCHLORIDE 120 MG: 80 TABLET ORAL at 20:08

## 2022-05-04 NOTE — PLAN OF CARE
Goal Outcome Evaluation:  Plan of Care Reviewed With: patient      Patient HR ranging from 40's to 50's this shift. Patient reports that he is anxious when his HR drops below 50. Lowest HR was 40-44 and his highest has been 79. Patient denies any dizziness or symptoms at this time. AM dose of sotalol held to discuss low HR and parameters with Aniyah this am. . Will continue to monitor.

## 2022-05-04 NOTE — PAYOR COMM NOTE
"Santiago Zamora (47 y.o. Male)                         ATTENTION;    CASE REF # [id number] - ADDITIONAL CLINICAL - EP CONSULT                       FOR REVIEW, REPLY TO [facility] DEPT  [phone] OR CALL  ALBA PHAMMASSIEL              Date of Birth   1975    Social Security Number       Address   [address] [place], [place] [address]    Home Phone   [phone]    MRN   [id number]       Mandaen   None    Marital Status                               Admission Date   5/2/22    Admission Type   Elective    Admitting Provider   Stanley Ramirez MD    Attending Provider   Stanley Ramirez MD    [address], [address]       Discharge Date       Discharge Disposition       Discharge Destination                               Attending Provider: Stanley Ramirez MD    Allergies: Eggs Or Egg-derived Products, Fish-derived Products    Isolation: None   Infection: None   Code Status: CPR   Advance Care Planning Activity    Ht: 185.4 cm (73\")   Wt: 123 kg (272 lb)    Admission Cmt: None   Principal Problem: None                Active Insurance as of 5/2/2022     Primary Coverage     Payor Plan Insurance Group Employer/Plan Group    [employer] PPO [id number]     Payor Plan Address Payor Plan Phone Number Payor Plan Fax Number Effective Dates    [address] [phone]  6/1/2020 - None Entered    [facility] [id number]       Subscriber Name Subscriber Birth Date Member ID       TANIA ZAMORA 4/23/1983 [id number]                 Emergency Contacts      (Rel.) Home Phone Work Phone Mobile Phone    Tania Zamora (Spouse) -- -- [phone]    DEBRA WESTFALL (Relative) -- -- [phone]               Consult Notes (last 24 hours)      Nuha Sorto APRN at 05/03/22 1430      Consult Orders    1. Cardiac Electrophysiologist Inpatient Consult [[id number]] ordered by Stanley Ramirez MD at 05/02/22 1252           "           Electrophysiology Hospital Consult            Patient Name: Santiago Zamora  Age/Sex: 47 y.o. male  : 1975  MRN: [id number]    Date of Admission: 2022  Date of Encounter Visit: 22  Encounter Provider: KIAN Beckman  Referring Provider: Stanley Ramirez MD  Place of Service: [facility]  Patient Care Team:  Quinn Stoner DO as PCP - General (Family Medicine)  Quinn Stoner DO      Subjective:   EP Consultation for: NSVT, PAF, abnormal EKG    Chief Complaint: Heart fluttering, elective cath yesterday, admitted for sotalol initiation    History of Present Illness:  Santiago Zamora is a 47 y.o. male patient with diabetes, HTN, HLD, anxiety, atrial fibrillation and remote history of CHF (reported prior EF of 10%--15 yrs ago in the setting of heavy alcohol use which he has quit completely,) who initially presented to [facility]  with complaints of heart fluttering associated with lightheadedness and dyspnea.    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     He does have VIDHYA but is intolerant to CPAP.     He had not seen cardiology in a long time and reportedly was released from them because his EF had improved.     He was noted to be in a WCT--thought to be afib w/LAFB, placed on diltiazem gtt, spontaneously converted to SR-EKG showed SR w/incomplete LBBB.      Echo during that admission showed normal EF--he was dc'd home with Zio, placed on ASA 81 mg, diltiazem and spironolactone.     Returned to ED  with chest pain and palpitations, found again to have AF w/RVR, treated initially with IV amiodarone, converted, transitioned to diltiazem and metoprolol added and started on apixaban. Discharged 4/15.     He has felt better since his most recent discharge.     Zio showed AF and NSVT---Dr. Woods reviewed with   Tommy, cath recommended and if okay then admit for sotalol initiation further work up/evaluation.     Cath yesterday showed normal coronaries and normal EF. He presented in SR and said the last couple of days have been good. Sotalol started yesterday afternoon. He has a significantly abnormal baseline EKG, cardiac MRI has been ordered.     Past Medical History:  Past Medical History:   Diagnosis Date   • Anxiety    • Cardiomyopathy (HCC)    • Depression    • Diabetes mellitus (HCC)    • Elevated cholesterol    • Hyperlipidemia    • Hypertension    • Varicose veins        Past Surgical History:   Procedure Laterality Date   • CARDIAC CATHETERIZATION     • CARDIAC CATHETERIZATION N/A 5/2/2022    Procedure: Coronary angiography;  Surgeon: Stanley Ramirez MD;  Location: [facility] CATH INVASIVE LOCATION;  Service: Cardiovascular;  Laterality: N/A;   • CARDIAC CATHETERIZATION N/A 5/2/2022    Procedure: Left Heart Cath;  Surgeon: Stanley Ramirez MD;  Location: [facility] CATH INVASIVE LOCATION;  Service: Cardiovascular;  Laterality: N/A;   • CARDIAC CATHETERIZATION N/A 5/2/2022    Procedure: Left ventriculography;  Surgeon: Stanley Ramirez MD;  Location: [facility] CATH INVASIVE LOCATION;  Service: Cardiovascular;  Laterality: N/A;   • CHOLECYSTECTOMY         Home Medications:   Medications Prior to Admission   Medication Sig Dispense Refill Last Dose   • atorvastatin (LIPITOR) 40 MG tablet Take 1 tablet by mouth Daily. 30 tablet 1 5/1/2022 at Unknown time   • citalopram (CeleXA) 40 MG tablet Take 1 tablet by mouth Daily. 90 tablet 1 5/1/2022 at Unknown time   • cyclobenzaprine (FLEXERIL) 10 MG tablet Take 1 tablet by mouth 3 (Three) Times a Day As Needed for Muscle Spasms. 90 tablet 0 5/1/2022 at Unknown time   • dilTIAZem CD (CARDIZEM CD) 240 MG 24 hr capsule Take 1 capsule by mouth Daily. 30 capsule 0 5/1/2022 at Unknown time   • diphenhydrAMINE (BENADRYL) 50 MG tablet 1 tablet 1 hour prior to procedure 1 tablet 0 5/2/2022  at Unknown time   • lisinopril (PRINIVIL,ZESTRIL) 20 MG tablet Take 1 tablet by mouth Daily. 90 tablet 1 5/1/2022 at Unknown time   • metoprolol tartrate (LOPRESSOR) 25 MG tablet Take 0.5 tablets by mouth Every 12 (Twelve) Hours. 60 tablet 0 5/1/2022 at Unknown time   • predniSONE (DELTASONE) 50 MG tablet 1 tablet 13 hours before procedure, 1 tablet 7 hours before procedure, 1 tablet 1 hour prior to procedure 3 tablet 0 5/2/2022 at Unknown time   • 1st Choice Lancets Super Thin misc USE TO TEST GLUCOSE DAILY 100 each 5    • apixaban (ELIQUIS) 5 MG tablet tablet Take 1 tablet by mouth Every 12 (Twelve) Hours. Indications: Atrial Fibrillation 60 tablet 0 4/29/2022   • empagliflozin (Jardiance) 25 MG tablet tablet Take 1 tablet by mouth Daily. 30 tablet 1 4/29/2022   • famotidine (PEPCID) 10 MG tablet Take 1 tablet by mouth 3 (Three) Times a Day. Take 1 1/2 tables at 9 am, 3 pm and 9 pm day prior to procedure 6 tablet 0    • glimepiride (Amaryl) 4 MG tablet Take 1 tablet by mouth Every Morning Before Breakfast. 30 tablet 1 4/29/2022   • glucose blood (Accu-Chek Rebecca) test strip Test blood sugar once daily as instructed 100 each 5    • glucose monitor monitoring kit Use accu-chek Rebecca glucometer to check your blood sugar once daily 1 each 0        Allergies:  Allergies   Allergen Reactions   • Eggs Or Egg-Derived Products Swelling   • Fish-Derived Products Swelling       Past Social History:  Social History     Socioeconomic History   • Marital status:    Tobacco Use   • Smoking status: Never Smoker   • Smokeless tobacco: Never Used   Substance and Sexual Activity   • Alcohol use: No   • Drug use: No   • Sexual activity: Defer       Past Family History:  Family History   Problem Relation Age of Onset   • Hypertension Mother    • Hypertension Father    • Atrial fibrillation Father    • Heart attack Father    • Diabetes Other        Review of Systems: All systems reviewed. Pertinent positives identified in HPI.  All other systems are negative.     14 point ROS was performed and is negative except as outlined in HPI.     Objective:     Objective:  Vital Signs (last 24 hours)       05/02 0700 05/03 0659 05/03 0700 05/03 0815   Most Recent      Temp (°F) 97.5 -  97.9      97.5     97.5 (36.4) 05/03 0725    Heart Rate 53 -  86      55     55 05/03 0725    Resp 16 -  20      18     18 05/03 0725    /84 -  150/80      111/66     111/66 05/03 0725    SpO2 (%) 91 -  97      97     97 05/03 0725        Temp:  [97.5 °F (36.4 °C)-97.9 °F (36.6 °C)] 97.5 °F (36.4 °C)  Heart Rate:  [53-86] 55  Resp:  [16-20] 18  BP: (111-150)/(66-91) 111/66  Body mass index is 35.89 kg/m².        Physical Exam:     General Appearance: No acute distress, well developed and well nourished.   Eyes: Conjunctiva and lids: No erythema, swelling, or discharge. Sclera non-icteric.   HENT: Atraumatic, normocephalic. External eyes, ears, and nose normal.   Respiratory: No signs of respiratory distress. Respiration rhythm and depth normal.   Clear to auscultation. No rales, crackles, rhonchi, or wheezing auscultated.   Cardiovascular:  Jugular Venous Pressure: Normal  Heart Rate and Rhythm: Normal, Heart Sounds: Normal S1 and S2. No S3 or S4 noted.  Murmurs: No murmurs noted. No rubs, thrills, or gallops.   Arterial Pulses: Posterior tibialis and dorsalis pedis pulses normal.   Lower Extremities: No edema noted.  Gastrointestinal:  Abdomen soft, non-distended, non-tender.  Musculoskeletal: Normal movement of extremities  Skin: Warm and dry.   Psychiatric: Patient alert and oriented to person, place, and time. Speech and behavior appropriate. Normal mood and affect.    Labs:   Lab Review:     Results from last 7 days   Lab Units 05/03/22  0259 04/29/22  1539   SODIUM mmol/L 136 136   POTASSIUM mmol/L 4.1 4.6   CHLORIDE mmol/L 103 98   CO2 mmol/L 22.0 24.9   BUN mg/dL 19 14   CREATININE mg/dL 0.78 1.05   GLUCOSE mg/dL 142* 98   CALCIUM mg/dL 9.1 10.0          Results from last 7 days   Lab Units 22  0259   WBC 10*3/mm3 12.17*   HEMOGLOBIN g/dL 15.6   HEMATOCRIT % 48.5   PLATELETS 10*3/mm3 225             Results from last 7 days   Lab Units 22  1739   MAGNESIUM mg/dL 2.2         Imagin/2/2022 Cath:    FINDINGS:     LEFT VENTRICULOGRAPHY: The LV pressure was 138/12.  There was normal LV systolic function without segmental wall motion abnormality.  There was no mitral insufficiency or gradient across the aortic valve on pullback.     CORONARY ANGIOGRAPHY:  Normal coronary arteries in a right dominant fashion     SUMMARY: Normal left heart cath     RECOMMENDATIONS: Arrhythmia service to evaluate ventricular tachycardia will admit to the hospital for this     Stanley Ramirez MD  22  12:53 EDT     2022 Echo:    Interpretation Summary    · Left ventricular wall thickness is consistent with mild concentric hypertrophy.  · Calculated left ventricular EF = 57.8% Estimated left ventricular EF was in agreement with the calculated left ventricular EF. Left ventricular systolic function is normal.  · Estimated right ventricular systolic pressure from tricuspid regurgitation is normal (<35 mmHg).  · Left ventricular diastolic function was normal.     2022 Monitor:    Study Description    Monitor placed on patient by Kate Ahumada on 2022  at 10:36 EDT.  The monitor was scanned on 2022. The patient was monitored for 11 days 2 hours. Indications for this exam include unspecified atrial fibrillation. Average HR:  72. Min HR:  45. Max HR:  285.     Study Findings    Patient diary was not submitted. No symptoms reported during the monitoring period. No complications noted. The predominant rhythm noted during the testing period was sinus rhythm. Premature atrial contractions occured rarely. Evidence of atrial fibrillation was noted. There is a 3% burden of atrial fibrillation, with rates 72 to 177 bpm (average 114 bpm).  The longest episode  lasted 8.5 hours.. There were no episodes of supraventricular tachycardia. Premature ventricular contractions occured rarely. Ventricular trigeminy. Numerous (1129) episodes of regular, wide-complex tachycardia with a different QRS configuration than baseline were noted.  These are most consistent with ventricular tachycardia.  Some of these are sustained for up to a minute to 90 seconds.  These episodes are extremely fast, with rates as high as 280 bpm.  The patient's symptoms correlated with ventricular ectopy.. Sinoatrial node conduction was normal. No atrioventricular block noted.     Study Impressions    An abnormal monitor study.           Signed    Electronically signed by Jayme Bartlett MD on 4/26/22 at 1126 EDT         EKG:                 I personally viewed and interpreted the patient's EKG/Telemetry tracings.    Assessment:       Atrial fibrillation with RVR (HCC)    VT (ventricular tachycardia) (HCC)    Ventricular tachycardia (HCC)        Plan:     Dr. Sanders and I saw patient this afternoon. He is tolerating sotalol well and QTc is okay, continue 160 mg dose for now.     He went down for cardiac MRI this afternoon but was unable to complete due to anxiety, his nurse had called to get valium ordered pre MRI but he had already left the floor---he tried but was unable to complete, will try again later today or tomorrow.     Suspect that he has scar from his cardiomyopathy many years ago but will see what MRI shows.     Thank you for allowing me to participate in the care of Santiago Zamora. Feel free to contact me directly with any further questions or concerns.    KIAN Beckman  [facility]  05/03/22  08:15 EDT      Electronically signed by Nuha Sorto APRN at 05/03/22 6463

## 2022-05-04 NOTE — PROGRESS NOTES
Electrophysiology Follow-Up Note      Patient Name: Santiago Zamora  Age/Sex: 47 y.o. male  : 1975  MRN: [id number]      Day of Service: 22       Chief Complaint/Follow-up: PAF, NSVT, abnormal EKG, admitted for cath and initiation of sotalol.     Interval History: No acute events overnight    Temp:  [97.5 °F (36.4 °C)-98.4 °F (36.9 °C)] 97.6 °F (36.4 °C)  Heart Rate:  [50-61] 50  Resp:  [16-18] 18  BP: (121-140)/(63-85) 140/83     PHYSICAL EXAM:    General Appearance: No acute distress, well developed and well nourished.   Eyes: Conjunctiva and lids: No erythema, swelling, or discharge. Sclera non-icteric.   HENT: Atraumatic, normocephalic. External eyes, ears, and nose normal.   Respiratory: No signs of respiratory distress. Respiration rhythm and depth normal.   Clear to auscultation. No rales, crackles, rhonchi, or wheezing auscultated.   Cardiovascular:  Heart Rate and Rhythm: Normal, Heart Sounds: Normal S1 and S2. No S3 or S4 noted.  Murmurs: No murmurs noted. No rubs, thrills, or gallops.   Arterial Pulses: Posterior tibialis and dorsalis pedis pulses normal.   Lower Extremities: No edema noted.  Gastrointestinal:  Abdomen soft, non-distended, non-tender.  Musculoskeletal: Normal movement of extremities  Skin: Warm and dry.   Psychiatric: Patient alert and oriented to person, place, and time. Speech and behavior appropriate. Normal mood and affect.       ECG/TELE:           Results from last 7 days   Lab Units 22  0249 22  0259 22  1539   SODIUM mmol/L 137 136 136   POTASSIUM mmol/L 4.3 4.1 4.6   CHLORIDE mmol/L 101 103 98   CO2 mmol/L 27.0 22.0 24.9   BUN mg/dL 20 19 14   CREATININE mg/dL 1.01 0.78 1.05   GLUCOSE mg/dL 103* 142* 98   CALCIUM mg/dL 8.9 9.1 10.0     Results from last 7 days   Lab Units 22  0259 22  1539   WBC 10*3/mm3 12.17* 7.67   HEMOGLOBIN g/dL 15.6 15.1   HEMATOCRIT % 48.5 48.2   PLATELETS 10*3/mm3 225 251                       Current  Medications:   Scheduled Meds:apixaban, 5 mg, Oral, Q12H  atorvastatin, 40 mg, Oral, Daily  citalopram, 40 mg, Oral, Daily  diazePAM, 5 mg, Intravenous, Once  empagliflozin, 25 mg, Oral, Daily  gadobenate dimeglumine, 20 mL, Intravenous, Once in imaging  insulin lispro, 0-9 Units, Subcutaneous, TID AC  lisinopril, 20 mg, Oral, Daily  sotalol, 120 mg, Oral, Q12H            Atrial fibrillation with RVR (HCC)    VT (ventricular tachycardia) (HCC)    Ventricular tachycardia (HCC)       Plan:     ---PAF, NSVT---sotalol started, tolerating well, decreased dose to 120 mg BID.     --HTN, controlled    --Cath, normal cors, EF normal, cardiac MRI pending    --Hx of cardiomyopathy approx 15 years ago (thought to be related to heavy alcohol use---resolved). Suspect VT is coming from scar related to past CM.     --DM, uncontrolled, Hgb A1c 11.8 4/7---PCP is working on this     --HLD on statin    Plan is home tomorrow.     Nuha Sorto, APRN  05/04/22  5083

## 2022-05-04 NOTE — CASE MANAGEMENT/SOCIAL WORK
Discharge Planning Assessment  [facility]     Patient Name: Santiago Zamora  MRN: [id number]  Today's Date: 5/4/2022    Admit Date: 5/2/2022     Discharge Needs Assessment     Row Name 05/04/22 1353       Living Environment    People in Home spouse;child(luann), dependent    Name(s) of People in Home Suzy Zamora, wife and two teenage children    Current Living Arrangements home    Primary Care Provided by self    Provides Primary Care For child(luann)    Family Caregiver if Needed spouse    Family Caregiver Names vel Almonte    Quality of Family Relationships helpful;involved;supportive    Able to Return to Prior Arrangements yes       Resource/Environmental Concerns    Resource/Environmental Concerns none    Transportation Concerns none       Transition Planning    Patient/Family Anticipates Transition to home with family    Patient/Family Anticipated Services at Transition none    Transportation Anticipated car, drives self       Discharge Needs Assessment    Readmission Within the Last 30 Days no previous admission in last 30 days    Equipment Currently Used at Home glucometer;bp cuff;pulse ox;scales    Concerns to be Addressed no discharge needs identified;denies needs/concerns at this time    Anticipated Changes Related to Illness none    Equipment Needed After Discharge none    Provided Post Acute Provider List? N/A    N/A Provider List Comment no need for list identified               Discharge Plan     Row Name 05/04/22 2864       Plan    Plan Home;  RN to confirm that Pt Sotalol is in hand from [facility] to [facility] prior to his d/c home.    Plan Comments CCP spoke with Pt spouse, Suzy Zamora ([phone]) at bedside.  Pt off unit for testing.  CCP introduced self and role.  Pt information on his face sheet confirmed.  Pt is IADL’s, works full-time and drives.  Pt lives with wife and two dependent teenage children.  Pt PCP is Quinn Stoner.  Pt pharmacy confirmed as [facility].  Spouse is  agreeable to using [facility] at d/c.  Pt uses the following DME-glucometer, BP cuff monitor, scales and pulse oximeter on his apple watch eduar.  Pt plans to return home at discharge.  CCP will continue to follow…….Aleisha GOODWIN /.              Continued Care and Services - Admitted Since 5/2/2022    Coordination has not been started for this encounter.       Expected Discharge Date and Time     Expected Discharge Date Expected Discharge Time    May 6, 2022          Demographic Summary     Row Name 05/04/22 135       General Information    Admission Type inpatient    Arrived From home    Referral Source admission list;physician    Reason for Consult medication concerns;discharge planning  Make sure Pt can afford sotalol at d/c.    Preferred Language English       Contact Information    Permission Granted to Share Info With family/designee               Functional Status     Row Name 05/04/22 1351       Functional Status    Usual Activity Tolerance good    Current Activity Tolerance good       Functional Status, IADL    Medications independent    Meal Preparation independent    Housekeeping independent    Laundry independent    Shopping independent       Mental Status    General Appearance WDL WDL       Mental Status Summary    Recent Changes in Mental Status/Cognitive Functioning no changes       Employment/    Employment Status employed full-time               Psychosocial    No documentation.                Abuse/Neglect    No documentation.                Legal    No documentation.                Substance Abuse    No documentation.                Patient Forms    No documentation.                   Aleisha Nance RN

## 2022-05-05 ENCOUNTER — TELEPHONE (OUTPATIENT)
Dept: CARDIOLOGY | Facility: CLINIC | Age: 47
End: 2022-05-05

## 2022-05-05 ENCOUNTER — READMISSION MANAGEMENT (OUTPATIENT)
Dept: CALL CENTER | Facility: HOSPITAL | Age: 47
End: 2022-05-05

## 2022-05-05 VITALS
BODY MASS INDEX: 36.05 KG/M2 | RESPIRATION RATE: 18 BRPM | SYSTOLIC BLOOD PRESSURE: 131 MMHG | DIASTOLIC BLOOD PRESSURE: 83 MMHG | WEIGHT: 272 LBS | TEMPERATURE: 98.8 F | HEIGHT: 73 IN | OXYGEN SATURATION: 90 % | HEART RATE: 54 BPM

## 2022-05-05 LAB
ANION GAP SERPL CALCULATED.3IONS-SCNC: 12 MMOL/L (ref 5–15)
BUN SERPL-MCNC: 16 MG/DL (ref 6–20)
BUN/CREAT SERPL: 19.5 (ref 7–25)
CALCIUM SPEC-SCNC: 9.2 MG/DL (ref 8.6–10.5)
CHLORIDE SERPL-SCNC: 100 MMOL/L (ref 98–107)
CO2 SERPL-SCNC: 28 MMOL/L (ref 22–29)
CREAT SERPL-MCNC: 0.82 MG/DL (ref 0.76–1.27)
EGFRCR SERPLBLD CKD-EPI 2021: 110.4 ML/MIN/1.73
GLUCOSE BLDC GLUCOMTR-MCNC: 176 MG/DL (ref 70–130)
GLUCOSE BLDC GLUCOMTR-MCNC: 96 MG/DL (ref 70–130)
GLUCOSE SERPL-MCNC: 93 MG/DL (ref 65–99)
POTASSIUM SERPL-SCNC: 4.2 MMOL/L (ref 3.5–5.2)
QT INTERVAL: 473 MS
SODIUM SERPL-SCNC: 140 MMOL/L (ref 136–145)

## 2022-05-05 PROCEDURE — 82962 GLUCOSE BLOOD TEST: CPT

## 2022-05-05 PROCEDURE — 80048 BASIC METABOLIC PNL TOTAL CA: CPT | Performed by: NURSE PRACTITIONER

## 2022-05-05 PROCEDURE — 99238 HOSP IP/OBS DSCHRG MGMT 30/<: CPT | Performed by: NURSE PRACTITIONER

## 2022-05-05 RX ORDER — SOTALOL HYDROCHLORIDE 120 MG/1
120 TABLET ORAL EVERY 12 HOURS
Qty: 60 TABLET | Refills: 5 | Status: SHIPPED | OUTPATIENT
Start: 2022-05-05 | End: 2022-07-12 | Stop reason: SDUPTHER

## 2022-05-05 RX ADMIN — CITALOPRAM 40 MG: 40 TABLET, FILM COATED ORAL at 08:45

## 2022-05-05 RX ADMIN — SOTALOL HYDROCHLORIDE 120 MG: 80 TABLET ORAL at 08:44

## 2022-05-05 RX ADMIN — LISINOPRIL 20 MG: 20 TABLET ORAL at 08:44

## 2022-05-05 RX ADMIN — EMPAGLIFLOZIN 25 MG: 25 TABLET, FILM COATED ORAL at 08:44

## 2022-05-05 RX ADMIN — APIXABAN 5 MG: 5 TABLET, FILM COATED ORAL at 08:45

## 2022-05-05 NOTE — CASE MANAGEMENT/SOCIAL WORK
Case Management Discharge Note      Final Note: d/c home no needs    Provided Post Acute Provider List?: N/A  N/A Provider List Comment: no need for list identified    Selected Continued Care - Discharged on 5/5/2022 Admission date: 5/2/2022 - Discharge disposition: Home or Self Care    Destination    No services have been selected for the patient.              Durable Medical Equipment    No services have been selected for the patient.              Dialysis/Infusion    No services have been selected for the patient.              Home Medical Care    No services have been selected for the patient.              Therapy    No services have been selected for the patient.              Community Resources    No services have been selected for the patient.              Community & DME    No services have been selected for the patient.                  Transportation Services  Private: Car    Final Discharge Disposition Code: 01 - home or self-care

## 2022-05-05 NOTE — TELEPHONE ENCOUNTER
Attempted to call patient. No answer and no option to leave a VM. Will continue to try to reach him.    Thank you,    Debi Chery, RN  Triage [facility]

## 2022-05-05 NOTE — DISCHARGE SUMMARY
DISCHARGE NOTE    Patient Name: Santiago Zamora  Age/Sex: 47 y.o. male  : 1975  MRN: [id number]    Date of Discharge:  2022   Date of Admit: 2022  Encounter Provider: KIAN Beckman  Place of Service: [facility]  Patient Care Team:  Quinn Stoner DO as PCP - General (Family Medicine)  Quinn Stoner DO    Subjective:     Discharge Diagnosis:    Atrial fibrillation with RVR (HCC)    VT (ventricular tachycardia) (HCC)    Ventricular tachycardia (HCC)      Hospital Course:     47 yr old patient of Dr. Woods and Dr. Sanders, see H&P and consult notes for details but presented initially to [facility] with palpitations, lightheadedness and dizziness.     Noted to be in AF w/RVR but also with frequent runs of NSVT---spontaneously converted, was dc'd on meds and AC and with Zio.    Zio showed frequent runs of PAF/NSVT---abnormal baseline EKG---echo was okay, recommended cath which he came in for on Monday. Cors okay, normal EF, suspected his NSVT is scar related from his history of NICM about 15 yrs ago (EF recovered and no issues until now).     Admitted for initiation of sotalol.    Sotalol started at 160 BID--decreased to 120 mg BID, tolerated well and has maintained SR.    Cardiac MRI pending, will call with results.     Dc home today, follow up in the office in 4 weeks.       Vital Signs  Temp:  [97.4 °F (36.3 °C)-98.8 °F (37.1 °C)] 98.8 °F (37.1 °C)  Heart Rate:  [49-64] 54  Resp:  [16-18] 18  BP: (113-138)/(74-83) 131/83    Intake/Output Summary (Last 24 hours) at 2022 1005  Last data filed at 2022 0831  Gross per 24 hour   Intake 2010 ml   Output 2250 ml   Net -240 ml       Physical Exam:    General Appearance: No acute distress, well developed and well nourished.   Eyes: Conjunctiva and lids: No erythema, swelling, or discharge.  Sclera non-icteric.   HENT: Atraumatic, normocephalic. External eyes, ears, and nose normal.   Respiratory: No signs of respiratory distress. Respiration rhythm and depth normal.   Clear to auscultation. No rales, crackles, rhonchi, or wheezing auscultated.   Cardiovascular:  Jugular Venous Pressure: Normal  Heart Rate and Rhythm: Normal, Heart Sounds: Normal S1 and S2. No S3 or S4 noted.  Murmurs: No murmurs noted. No rubs, thrills, or gallops.   Arterial Pulses: Posterior tibialis and dorsalis pedis pulses normal.   Lower Extremities: No edema noted.  Gastrointestinal:  Abdomen soft, non-distended, non-tender.  Musculoskeletal: Normal movement of extremities  Skin: Warm and dry.   Psychiatric: Patient alert and oriented to person, place, and time. Speech and behavior appropriate. Normal mood and affect.    Labs:   Results from last 7 days   Lab Units 05/05/22  0254 05/04/22  0249 05/03/22  0259 04/29/22  1539   SODIUM mmol/L 140 137 136 136   POTASSIUM mmol/L 4.2 4.3 4.1 4.6   CHLORIDE mmol/L 100 101 103 98   CO2 mmol/L 28.0 27.0 22.0 24.9   BUN mg/dL 16 20 19 14   CREATININE mg/dL 0.82 1.01 0.78 1.05   GLUCOSE mg/dL 93 103* 142* 98   CALCIUM mg/dL 9.2 8.9 9.1 10.0         Results from last 7 days   Lab Units 05/03/22  0259 04/29/22  1539   WBC 10*3/mm3 12.17* 7.67   HEMOGLOBIN g/dL 15.6 15.1   HEMATOCRIT % 48.5 48.2   PLATELETS 10*3/mm3 225 251         Results from last 7 days   Lab Units 05/02/22  1739   MAGNESIUM mg/dL 2.2                   Discharge Diet:    Dietary Orders (From admission, onward)     Start     Ordered    05/02/22 1250  Diet Regular; Consistent Carbohydrate  Diet Effective Now        Question Answer Comment   Diet Texture / Consistency Regular    Common Modifiers Consistent Carbohydrate        05/02/22 1252                Activity at Discharge:  as tolerated.     Discharge Medications     Discharge Medications      New Medications      Instructions Start Date   sotalol 120 MG tablet  Commonly  known as: BETAPACE   120 mg, Oral, Every 12 Hours         Continue These Medications      Instructions Start Date   1st Choice Lancets Super Thin misc   USE TO TEST GLUCOSE DAILY      apixaban 5 MG tablet tablet  Commonly known as: ELIQUIS   5 mg, Oral, Every 12 Hours Scheduled      atorvastatin 40 MG tablet  Commonly known as: LIPITOR   40 mg, Oral, Daily      citalopram 40 MG tablet  Commonly known as: CeleXA   40 mg, Oral, Daily      cyclobenzaprine 10 MG tablet  Commonly known as: FLEXERIL   10 mg, Oral, 3 Times Daily PRN      diphenhydrAMINE 50 MG tablet  Commonly known as: BENADRYL   1 tablet 1 hour prior to procedure      empagliflozin 25 MG tablet tablet  Commonly known as: Jardiance   25 mg, Oral, Daily      famotidine 10 MG tablet  Commonly known as: PEPCID   10 mg, Oral, 3 Times Daily, Take 1 1/2 tables at 9 am, 3 pm and 9 pm day prior to procedure      glimepiride 4 MG tablet  Commonly known as: Amaryl   4 mg, Oral, Every Morning Before Breakfast      glucose blood test strip  Commonly known as: Accu-Chek Rebecca   Test blood sugar once daily as instructed      glucose monitor monitoring kit   Use accu-chek Rebecca glucometer to check your blood sugar once daily      lisinopril 20 MG tablet  Commonly known as: PRINIVIL,ZESTRIL   20 mg, Oral, Daily      predniSONE 50 MG tablet  Commonly known as: DELTASONE   1 tablet 13 hours before procedure, 1 tablet 7 hours before procedure, 1 tablet 1 hour prior to procedure         Stop These Medications    dilTIAZem  MG 24 hr capsule  Commonly known as: CARDIZEM CD     metoprolol tartrate 25 MG tablet  Commonly known as: LOPRESSOR            Discharge disposition: home    Follow-up Appointments   Follow-up Information     Nuha Sorto APRN Follow up in 1 month(s).    Specialty: Cardiology  Contact information:  [address] [address] [phone]             Quinn Stoner DO .    Specialty: Family Medicine  Contact information:  [address]  [facility] [address]  [phone]                       Future Appointments   Date Time Provider Department Center   5/12/2022 10:00 AM Rosario Millard APRN [facility]   5/18/2022  2:15 PM Radha Zuluaga APRN [facility] None         KIAN Beckman  05/05/22  10:05 EDT

## 2022-05-05 NOTE — TELEPHONE ENCOUNTER
----- Message from KIAN Beckman sent at 5/5/2022  2:21 PM EDT -----  Call and let him know that his MRI was normal as we expected

## 2022-05-06 ENCOUNTER — TRANSITIONAL CARE MANAGEMENT TELEPHONE ENCOUNTER (OUTPATIENT)
Dept: CALL CENTER | Facility: HOSPITAL | Age: 47
End: 2022-05-06

## 2022-05-06 NOTE — PAYOR COMM NOTE
"Santiago Zamora (47 y.o. Male)                              ATTENTION;    DISCHARGE DATE 5/5/22    CASE REF                           [id number]                               Date of Birth   1975    Social Security Number       Address   [address] [place] [address]    Home Phone   [phone]    MRN   [id number]       Pentecostal   None    Marital Status                               Admission Date   5/2/22    Admission Type   Elective    Admitting Provider   Stanley Ramirez MD    Attending Provider       Department, Room/Bed   [address] [facility], 2206/1       Discharge Date   5/5/2022    Discharge Disposition   Home or Self Care    Discharge Destination                               Attending Provider: (none)   Allergies: Eggs Or Egg-derived Products, Fish-derived Products    Isolation: None   Infection: None   Code Status: Prior   Advance Care Planning Activity    Ht: 185.4 cm (73\")   Wt: 123 kg (272 lb)    Admission Cmt: None   Principal Problem: None                Active Insurance as of 5/2/2022     Primary Coverage     Payor Plan Insurance Group Employer/Plan Group    ANTHEM BLUE CROSS [facility] BLUE CROSS BLUE SHIELD [facility] [id number]     Payor Plan Address Payor Plan Phone Number Payor Plan Fax Number Effective Dates    [address] [phone]  6/1/2020 - None Entered    [facility] [id number]       Subscriber Name Subscriber Birth Date Member ID       TANIA ZAMORA 4/23/1983 [id number]                 Emergency Contacts      (Rel.) Home Phone Work Phone Mobile Phone    Tania Zamora (Spouse) [phone] -- [phone]    DEBRA WESTFALL (Relative) [phone] -- [phone]               Discharge Summary      Nuha Sorto APRN at 05/05/22 3403     Attestation signed by Epi Sanders MD at 05/05/22 8122    I have reviewed this documentation and agree.                                                                                                "                         DISCHARGE NOTE    Patient Name: Santiago Zamora  Age/Sex: 47 y.o. male  : 1975  MRN: [id number]    Date of Discharge:  2022   Date of Admit: 2022  Encounter Provider: KIAN Beckman  Place of Service: [facility] CARDIOLOGY  Patient Care Team:  Quinn Stoner DO as PCP - General (Family Medicine)  Quinn Stoner DO    Subjective:     Discharge Diagnosis:    Atrial fibrillation with RVR (HCC)    VT (ventricular tachycardia) (HCC)    Ventricular tachycardia (HCC)      Hospital Course:     47 yr old patient of Dr. Woods and Dr. Sanders, see H&P and consult notes for details but presented initially to [facility] with palpitations, lightheadedness and dizziness.     Noted to be in AF w/RVR but also with frequent runs of NSVT---spontaneously converted, was dc'd on meds and AC and with Zio.    Zio showed frequent runs of PAF/NSVT---abnormal baseline EKG---echo was okay, recommended cath which he came in for on Monday. Cors okay, normal EF, suspected his NSVT is scar related from his history of NICM about 15 yrs ago (EF recovered and no issues until now).     Admitted for initiation of sotalol.    Sotalol started at 160 BID--decreased to 120 mg BID, tolerated well and has maintained SR.    Cardiac MRI pending, will call with results.     Dc home today, follow up in the office in 4 weeks.       Vital Signs  Temp:  [97.4 °F (36.3 °C)-98.8 °F (37.1 °C)] 98.8 °F (37.1 °C)  Heart Rate:  [49-64] 54  Resp:  [16-18] 18  BP: (113-138)/(74-83) 131/83    Intake/Output Summary (Last 24 hours) at 2022 1005  Last data filed at 2022 0831  Gross per 24 hour   Intake 2010 ml   Output 2250 ml   Net -240 ml       Physical Exam:    General Appearance: No acute distress, well developed and well nourished.   Eyes: Conjunctiva and lids: No erythema, swelling, or discharge. Sclera non-icteric.   HENT: Atraumatic, normocephalic. External eyes, ears, and  nose normal.   Respiratory: No signs of respiratory distress. Respiration rhythm and depth normal.   Clear to auscultation. No rales, crackles, rhonchi, or wheezing auscultated.   Cardiovascular:  Jugular Venous Pressure: Normal  Heart Rate and Rhythm: Normal, Heart Sounds: Normal S1 and S2. No S3 or S4 noted.  Murmurs: No murmurs noted. No rubs, thrills, or gallops.   Arterial Pulses: Posterior tibialis and dorsalis pedis pulses normal.   Lower Extremities: No edema noted.  Gastrointestinal:  Abdomen soft, non-distended, non-tender.  Musculoskeletal: Normal movement of extremities  Skin: Warm and dry.   Psychiatric: Patient alert and oriented to person, place, and time. Speech and behavior appropriate. Normal mood and affect.    Labs:   Results from last 7 days   Lab Units 05/05/22  0254 05/04/22  0249 05/03/22  0259 04/29/22  1539   SODIUM mmol/L 140 137 136 136   POTASSIUM mmol/L 4.2 4.3 4.1 4.6   CHLORIDE mmol/L 100 101 103 98   CO2 mmol/L 28.0 27.0 22.0 24.9   BUN mg/dL 16 20 19 14   CREATININE mg/dL 0.82 1.01 0.78 1.05   GLUCOSE mg/dL 93 103* 142* 98   CALCIUM mg/dL 9.2 8.9 9.1 10.0         Results from last 7 days   Lab Units 05/03/22  0259 04/29/22  1539   WBC 10*3/mm3 12.17* 7.67   HEMOGLOBIN g/dL 15.6 15.1   HEMATOCRIT % 48.5 48.2   PLATELETS 10*3/mm3 225 251         Results from last 7 days   Lab Units 05/02/22  1739   MAGNESIUM mg/dL 2.2                   Discharge Diet:    Dietary Orders (From admission, onward)     Start     Ordered    05/02/22 1250  Diet Regular; Consistent Carbohydrate  Diet Effective Now        Question Answer Comment   Diet Texture / Consistency Regular    Common Modifiers Consistent Carbohydrate        05/02/22 1252                Activity at Discharge:  as tolerated.     Discharge Medications     Discharge Medications      New Medications      Instructions Start Date   sotalol 120 MG tablet  Commonly known as: BETAPACE   120 mg, Oral, Every 12 Hours         Continue These  Medications      Instructions Start Date   1st Choice Lancets Super Thin misc   USE TO TEST GLUCOSE DAILY      apixaban 5 MG tablet tablet  Commonly known as: ELIQUIS   5 mg, Oral, Every 12 Hours Scheduled      atorvastatin 40 MG tablet  Commonly known as: LIPITOR   40 mg, Oral, Daily      citalopram 40 MG tablet  Commonly known as: CeleXA   40 mg, Oral, Daily      cyclobenzaprine 10 MG tablet  Commonly known as: FLEXERIL   10 mg, Oral, 3 Times Daily PRN      diphenhydrAMINE 50 MG tablet  Commonly known as: BENADRYL   1 tablet 1 hour prior to procedure      empagliflozin 25 MG tablet tablet  Commonly known as: Jardiance   25 mg, Oral, Daily      famotidine 10 MG tablet  Commonly known as: PEPCID   10 mg, Oral, 3 Times Daily, Take 1 1/2 tables at 9 am, 3 pm and 9 pm day prior to procedure      glimepiride 4 MG tablet  Commonly known as: Amaryl   4 mg, Oral, Every Morning Before Breakfast      glucose blood test strip  Commonly known as: Accu-Chek Rebecca   Test blood sugar once daily as instructed      glucose monitor monitoring kit   Use accu-chek Rebecca glucometer to check your blood sugar once daily      lisinopril 20 MG tablet  Commonly known as: PRINIVIL,ZESTRIL   20 mg, Oral, Daily      predniSONE 50 MG tablet  Commonly known as: DELTASONE   1 tablet 13 hours before procedure, 1 tablet 7 hours before procedure, 1 tablet 1 hour prior to procedure         Stop These Medications    dilTIAZem  MG 24 hr capsule  Commonly known as: CARDIZEM CD     metoprolol tartrate 25 MG tablet  Commonly known as: LOPRESSOR            Discharge disposition: home    Follow-up Appointments   Follow-up Information     Nuha Sorto APRN Follow up in 1 month(s).    Specialty: Cardiology  Contact information:  [address]  [facility] [address]  [facility] [address] [phone]             Quinn Stoner DO .    Specialty: Family Medicine  Contact information:  [address] [facility] [address] [phone]                        Future Appointments   Date Time Provider Department Center   5/12/2022 10:00 AM Rosario Millard APRN [facility]   5/18/2022  2:15 PM Radha Zuluaga APRN [facility] None         KIAN Beckman  05/05/22  10:05 EDT      Electronically signed by Stevie Sanders MD at 05/05/22 1818       Discharge Order (From admission, onward)     Start     Ordered    05/05/22 1004  Discharge patient  Once        Expected Discharge Date: 05/05/22    Discharge Disposition: Home or Self Care    Physician of Record for Attribution - Please select from Treatment Team: STEVIE SANDERS [[id number]]    Review needed by CMO to determine Physician of Record: No       Question Answer Comment   Physician of Record for Attribution - Please select from Treatment Team STEVIE SANDERS    Review needed by CMO to determine Physician of Record No        05/05/22 1004

## 2022-05-06 NOTE — TELEPHONE ENCOUNTER
----- Message from Caro Dutton MD sent at 10/8/2019 10:12 AM CDT -----  Please contact patient with normal result     Notified patient of results. He verbalized understanding.    Debi Chery, RN  Triage [facility]

## 2022-05-06 NOTE — OUTREACH NOTE
Call Center TCM Note    Flowsheet Row Responses   [facility] patient discharged from? [place]   Does the patient have one of the following disease processes/diagnoses(primary or secondary)? Other   TCM attempt successful? Yes  [verbal release is over a year old]   Call start time 1449   Call end time 1451   Discharge diagnosis A-fib RVR, V-tach   Meds reviewed with patient/caregiver? Yes   Is the patient having any side effects they believe may be caused by any medication additions or changes? No   Does the patient have all medications ordered at discharge? Yes   Is the patient taking all medications as directed (includes completed medication regime)? Yes   Does the patient have a primary care provider?  Yes   Does the patient have an appointment with their PCP within 7 days of discharge? Greater than 7 days   Comments regarding PCP Hospital d/c f/u appt is on 5/17/22 at 1:30 pm    What is preventing the patient from scheduling follow up appointments within 7 days of discharge? Earlier appointment not available   Nursing Interventions Verified appointment date/time/provider   Has the patient kept scheduled appointments due by today? N/A   Psychosocial issues? No   Did the patient receive a copy of their discharge instructions? Yes   Nursing interventions Reviewed instructions with patient   What is the patient's perception of their health status since discharge? Improving   Is the patient/caregiver able to teach back signs and symptoms related to disease process for when to call PCP? Yes   Is the patient/caregiver able to teach back signs and symptoms related to disease process for when to call 911? Yes   Is the patient/caregiver able to teach back the hierarchy of who to call/visit for symptoms/problems? PCP, Specialist, Home health nurse, Urgent Care, ED, 911 Yes   If the patient is a current smoker, are they able to teach back resources for cessation? Not a smoker   TCM call completed? Yes           Kenna Velazco RN    5/6/2022, 14:51 CDT

## 2022-05-06 NOTE — OUTREACH NOTE
Prep Survey    Flowsheet Row Responses   [facility] facility patient discharged from? [place]   Is LACE score < 7 ? No   Emergency Room discharge w/ pulse ox? No   Eligibility [facility]   Date of Admission 05/02/22   Date of Discharge 05/05/22   Discharge Disposition Home or Self Care   Discharge diagnosis A-fib RVR, V-tach   Does the patient have one of the following disease processes/diagnoses(primary or secondary)? Other   Does the patient have Home health ordered? No   Is there a DME ordered? No   Prep survey completed? Yes          YOUNG LIMA - Registered Nurse

## 2022-05-16 ENCOUNTER — READMISSION MANAGEMENT (OUTPATIENT)
Dept: CALL CENTER | Facility: HOSPITAL | Age: 47
End: 2022-05-16

## 2022-05-16 NOTE — OUTREACH NOTE
Medical Week 2 Survey    Flowsheet Row Responses   [facility] [place] patient discharged from? [place]   Does the patient have one of the following disease processes/diagnoses(primary or secondary)? Other   Week 2 attempt successful? Yes   Call start time 1335   Rescheduled Rescheduled-pt requested   Call end time 1335          SHERMAN PLEITEZ - Registered Nurse

## 2022-05-17 ENCOUNTER — OFFICE VISIT (OUTPATIENT)
Dept: FAMILY MEDICINE CLINIC | Facility: CLINIC | Age: 47
End: 2022-05-17

## 2022-05-17 VITALS
WEIGHT: 280 LBS | SYSTOLIC BLOOD PRESSURE: 130 MMHG | DIASTOLIC BLOOD PRESSURE: 78 MMHG | BODY MASS INDEX: 37.11 KG/M2 | HEART RATE: 66 BPM | OXYGEN SATURATION: 98 % | TEMPERATURE: 97.3 F | HEIGHT: 73 IN

## 2022-05-17 DIAGNOSIS — E55.9 VITAMIN D DEFICIENCY: ICD-10-CM

## 2022-05-17 DIAGNOSIS — E78.2 MIXED HYPERLIPIDEMIA: ICD-10-CM

## 2022-05-17 DIAGNOSIS — I10 BENIGN ESSENTIAL HTN: ICD-10-CM

## 2022-05-17 DIAGNOSIS — E66.9 OBESITY (BMI 30-39.9): ICD-10-CM

## 2022-05-17 DIAGNOSIS — I48.91 ATRIAL FIBRILLATION WITH RVR: Primary | ICD-10-CM

## 2022-05-17 DIAGNOSIS — N52.9 ERECTILE DYSFUNCTION, UNSPECIFIED ERECTILE DYSFUNCTION TYPE: ICD-10-CM

## 2022-05-17 DIAGNOSIS — E11.65 UNCONTROLLED TYPE 2 DIABETES MELLITUS WITH HYPERGLYCEMIA: ICD-10-CM

## 2022-05-17 DIAGNOSIS — E11.65 TYPE 2 DIABETES MELLITUS WITH HYPERGLYCEMIA, WITHOUT LONG-TERM CURRENT USE OF INSULIN: ICD-10-CM

## 2022-05-17 DIAGNOSIS — I47.20 VENTRICULAR TACHYCARDIA: ICD-10-CM

## 2022-05-17 PROCEDURE — 99213 OFFICE O/P EST LOW 20 MIN: CPT | Performed by: FAMILY MEDICINE

## 2022-05-17 NOTE — PROGRESS NOTES
Subjective   Santiago Zamora is a 47 y.o. male with   Chief Complaint   Patient presents with   • Hospital Follow Up Visit     Admited for [facility]-Tac   .    History of Present Illness   47-year-old white male here for follow-up and further medical management in regards to recent hospitalization at [facility].  He apparently was home on day of admission and felt his heart began to flutter in an aggressive fashion and felt like he needed to go to the hospital.  Upon arrival he was found to be in atrial fibrillation with a rapid ventricular response.  He was admitted and medications were initiated.  Before sinus rhythm was found he apparently went into ventricular tachycardia and was being prepared to be shocked when his rhythm finally converted to sinus.  Current medications include sotalol, Eliquis and lisinopril.  He has not felt fluttering since discharge from the hospital.  He will follow-up with Dr. Blackmon of the cardiology service next week.  He does have an appointment tomorrow for a sleep evaluation.  He is also a type II non-insulin-dependent diabetic using glimepiride 4 mg daily in combination with Jardiance at 25 mg daily.  He has been intolerant to metformin in the past secondary to diarrhea.  Last fasting lab work with hemoglobin A1c over 11 with patient admitting that he was extremely noncompliant at that time with the use of his medication.  He states that this recent event has caused him great fear and concern and he is now serious about his health condition.  He is actually lost 37 pounds since hospitalization.  The following portions of the patient's history were reviewed and updated as appropriate: allergies, current medications, past family history, past medical history, past social history, past surgical history and problem list.    Review of Systems   Cardiovascular: Positive for palpitations.        Hypertension, atrial fibrillation/ventricular tachycardia, hyperlipidemia   Endocrine:         Exogenous obesity, vitamin D deficiency, type II non-insulin-dependent diabetes mellitus   Genitourinary:        Erectile dysfunction       Objective     Vitals:    05/17/22 1329   BP: 130/78   Pulse: 66   Temp: 97.3 °F (36.3 °C)   SpO2: 98%       Recent Results (from the past 672 hour(s))   Basic Metabolic Panel    Collection Time: 04/29/22  3:39 PM    Specimen: Blood   Result Value Ref Range    Glucose 98 65 - 99 mg/dL    BUN 14 6 - 20 mg/dL    Creatinine 1.05 0.76 - 1.27 mg/dL    Sodium 136 136 - 145 mmol/L    Potassium 4.6 3.5 - 5.2 mmol/L    Chloride 98 98 - 107 mmol/L    CO2 24.9 22.0 - 29.0 mmol/L    Calcium 10.0 8.6 - 10.5 mg/dL    BUN/Creatinine Ratio 13.3 7.0 - 25.0    Anion Gap 13.1 5.0 - 15.0 mmol/L    eGFR 89.2 >60.0 mL/min/1.73   CBC Auto Differential    Collection Time: 04/29/22  3:39 PM    Specimen: Blood   Result Value Ref Range    WBC 7.67 3.40 - 10.80 10*3/mm3    RBC 5.90 (H) 4.14 - 5.80 10*6/mm3    Hemoglobin 15.1 13.0 - 17.7 g/dL    Hematocrit 48.2 37.5 - 51.0 %    MCV 81.7 79.0 - 97.0 fL    MCH 25.6 (L) 26.6 - 33.0 pg    MCHC 31.3 (L) 31.5 - 35.7 g/dL    RDW 14.5 12.3 - 15.4 %    RDW-SD 42.7 37.0 - 54.0 fl    MPV 11.3 6.0 - 12.0 fL    Platelets 251 140 - 450 10*3/mm3    Neutrophil % 66.6 42.7 - 76.0 %    Lymphocyte % 20.7 19.6 - 45.3 %    Monocyte % 6.9 5.0 - 12.0 %    Eosinophil % 4.6 0.3 - 6.2 %    Basophil % 0.8 0.0 - 1.5 %    Immature Grans % 0.4 0.0 - 0.5 %    Neutrophils, Absolute 5.11 1.70 - 7.00 10*3/mm3    Lymphocytes, Absolute 1.59 0.70 - 3.10 10*3/mm3    Monocytes, Absolute 0.53 0.10 - 0.90 10*3/mm3    Eosinophils, Absolute 0.35 0.00 - 0.40 10*3/mm3    Basophils, Absolute 0.06 0.00 - 0.20 10*3/mm3    Immature Grans, Absolute 0.03 0.00 - 0.05 10*3/mm3    nRBC 0.0 0.0 - 0.2 /100 WBC   COVID-19,[facility]-HOUSE,Nasal Swab No Transport Media 3-4 HR TAT - Swab, Nasal Cavity    Collection Time: 04/29/22  3:41 PM    Specimen: Nasal Cavity; Swab   Result Value Ref Range    COVID19 Not  Detected Not Detected - Ref. Range   POC Glucose Once    Collection Time: 05/02/22 10:37 AM    Specimen: Blood   Result Value Ref Range    Glucose 212 (H) 70 - 130 mg/dL   ECG 12 Lead    Collection Time: 05/02/22  1:18 PM   Result Value Ref Range    QT Interval 371 ms   POC Glucose Once    Collection Time: 05/02/22  3:33 PM    Specimen: Blood   Result Value Ref Range    Glucose 357 (H) 70 - 130 mg/dL   ECG 12 Lead    Collection Time: 05/02/22  5:28 PM   Result Value Ref Range    QT Interval 407 ms   Magnesium    Collection Time: 05/02/22  5:39 PM    Specimen: Blood   Result Value Ref Range    Magnesium 2.2 1.6 - 2.6 mg/dL   POC Glucose Once    Collection Time: 05/02/22  8:34 PM    Specimen: Blood   Result Value Ref Range    Glucose 204 (H) 70 - 130 mg/dL   CBC (No Diff)    Collection Time: 05/03/22  2:59 AM    Specimen: Blood   Result Value Ref Range    WBC 12.17 (H) 3.40 - 10.80 10*3/mm3    RBC 5.88 (H) 4.14 - 5.80 10*6/mm3    Hemoglobin 15.6 13.0 - 17.7 g/dL    Hematocrit 48.5 37.5 - 51.0 %    MCV 82.5 79.0 - 97.0 fL    MCH 26.5 (L) 26.6 - 33.0 pg    MCHC 32.2 31.5 - 35.7 g/dL    RDW 14.4 12.3 - 15.4 %    RDW-SD 42.3 37.0 - 54.0 fl    MPV 11.1 6.0 - 12.0 fL    Platelets 225 140 - 450 10*3/mm3   Basic Metabolic Panel    Collection Time: 05/03/22  2:59 AM    Specimen: Blood   Result Value Ref Range    Glucose 142 (H) 65 - 99 mg/dL    BUN 19 6 - 20 mg/dL    Creatinine 0.78 0.76 - 1.27 mg/dL    Sodium 136 136 - 145 mmol/L    Potassium 4.1 3.5 - 5.2 mmol/L    Chloride 103 98 - 107 mmol/L    CO2 22.0 22.0 - 29.0 mmol/L    Calcium 9.1 8.6 - 10.5 mg/dL    BUN/Creatinine Ratio 24.4 7.0 - 25.0    Anion Gap 11.0 5.0 - 15.0 mmol/L    eGFR 112.1 >60.0 mL/min/1.73   POC Glucose Once    Collection Time: 05/03/22  5:59 AM    Specimen: Blood   Result Value Ref Range    Glucose 159 (H) 70 - 130 mg/dL   ECG 12 Lead    Collection Time: 05/03/22  6:21 AM   Result Value Ref Range    QT Interval 499 ms   ECG 12 Lead    Collection Time:  05/03/22  6:45 AM   Result Value Ref Range    QT Interval 491 ms   POC Glucose Once    Collection Time: 05/03/22 10:29 AM    Specimen: Blood   Result Value Ref Range    Glucose 150 (H) 70 - 130 mg/dL   POC Glucose Once    Collection Time: 05/03/22  4:24 PM    Specimen: Blood   Result Value Ref Range    Glucose 145 (H) 70 - 130 mg/dL   ECG 12 Lead    Collection Time: 05/03/22  7:39 PM   Result Value Ref Range    QT Interval 477 ms   POC Glucose Once    Collection Time: 05/03/22  8:49 PM    Specimen: Blood   Result Value Ref Range    Glucose 134 (H) 70 - 130 mg/dL   Basic Metabolic Panel    Collection Time: 05/04/22  2:49 AM    Specimen: Blood   Result Value Ref Range    Glucose 103 (H) 65 - 99 mg/dL    BUN 20 6 - 20 mg/dL    Creatinine 1.01 0.76 - 1.27 mg/dL    Sodium 137 136 - 145 mmol/L    Potassium 4.3 3.5 - 5.2 mmol/L    Chloride 101 98 - 107 mmol/L    CO2 27.0 22.0 - 29.0 mmol/L    Calcium 8.9 8.6 - 10.5 mg/dL    BUN/Creatinine Ratio 19.8 7.0 - 25.0    Anion Gap 9.0 5.0 - 15.0 mmol/L    eGFR 93.5 >60.0 mL/min/1.73   ECG 12 Lead    Collection Time: 05/04/22  4:16 AM   Result Value Ref Range    QT Interval 462 ms   POC Glucose Once    Collection Time: 05/04/22  5:34 AM    Specimen: Blood   Result Value Ref Range    Glucose 107 70 - 130 mg/dL   POC Glucose Once    Collection Time: 05/04/22 10:46 AM    Specimen: Blood   Result Value Ref Range    Glucose 180 (H) 70 - 130 mg/dL   ECG 12 Lead    Collection Time: 05/04/22  1:24 PM   Result Value Ref Range    QT Interval 447 ms   POC Glucose Once    Collection Time: 05/04/22  4:21 PM    Specimen: Blood   Result Value Ref Range    Glucose 162 (H) 70 - 130 mg/dL   POC Glucose Once    Collection Time: 05/04/22  9:05 PM    Specimen: Blood   Result Value Ref Range    Glucose 128 70 - 130 mg/dL   ECG 12 Lead    Collection Time: 05/04/22 10:15 PM   Result Value Ref Range    QT Interval 473 ms   Basic Metabolic Panel    Collection Time: 05/05/22  2:54 AM    Specimen: Blood    Result Value Ref Range    Glucose 93 65 - 99 mg/dL    BUN 16 6 - 20 mg/dL    Creatinine 0.82 0.76 - 1.27 mg/dL    Sodium 140 136 - 145 mmol/L    Potassium 4.2 3.5 - 5.2 mmol/L    Chloride 100 98 - 107 mmol/L    CO2 28.0 22.0 - 29.0 mmol/L    Calcium 9.2 8.6 - 10.5 mg/dL    BUN/Creatinine Ratio 19.5 7.0 - 25.0    Anion Gap 12.0 5.0 - 15.0 mmol/L    eGFR 110.4 >60.0 mL/min/1.73   POC Glucose Once    Collection Time: 05/05/22  5:34 AM    Specimen: Blood   Result Value Ref Range    Glucose 96 70 - 130 mg/dL   POC Glucose Once    Collection Time: 05/05/22 10:36 AM    Specimen: Blood   Result Value Ref Range    Glucose 176 (H) 70 - 130 mg/dL       Physical Exam  Vitals and nursing note reviewed.   Constitutional:       Appearance: Normal appearance. He is well-developed and well-groomed. He is obese.      Comments: Exogenous obesity with a BMI of 36.9   HENT:      Head: Normocephalic and atraumatic.   Neck:      Thyroid: No thyroid mass or thyromegaly.      Vascular: Normal carotid pulses. No carotid bruit.      Trachea: Trachea and phonation normal.   Cardiovascular:      Rate and Rhythm: Normal rate and regular rhythm.      Heart sounds: Normal heart sounds. No murmur heard.    No friction rub. No gallop.   Pulmonary:      Effort: Pulmonary effort is normal. No respiratory distress.      Breath sounds: Normal breath sounds. No decreased breath sounds, wheezing, rhonchi or rales.   Musculoskeletal:      Cervical back: Neck supple.   Lymphadenopathy:      Cervical: No cervical adenopathy.   Skin:     General: Skin is warm and dry.      Findings: No rash.   Neurological:      Mental Status: He is alert and oriented to person, place, and time.   Psychiatric:         Attention and Perception: Attention and perception normal.         Mood and Affect: Mood and affect normal.         Speech: Speech normal.         Behavior: Behavior normal. Behavior is cooperative.         Thought Content: Thought content normal.          Cognition and Memory: Cognition and memory normal.         Judgment: Judgment normal.         Assessment & Plan   Diagnoses and all orders for this visit:    1. Atrial fibrillation with RVR (HCC) (Primary)  -     Lipid panel; Future  -     Hemoglobin A1c; Future  -     Comprehensive metabolic panel; Future  -     Vitamin D 25 hydroxy; Future  -     TSH; Future  -     PSA DIAGNOSTIC ONLY; Future  -     CBC w AUTO Differential; Future    2. Benign essential HTN  -     Lipid panel; Future  -     Hemoglobin A1c; Future  -     Comprehensive metabolic panel; Future  -     Vitamin D 25 hydroxy; Future  -     TSH; Future  -     PSA DIAGNOSTIC ONLY; Future  -     CBC w AUTO Differential; Future    3. Mixed hyperlipidemia  -     Lipid panel; Future  -     Hemoglobin A1c; Future  -     Comprehensive metabolic panel; Future  -     Vitamin D 25 hydroxy; Future  -     TSH; Future  -     PSA DIAGNOSTIC ONLY; Future  -     CBC w AUTO Differential; Future    4. Ventricular tachycardia (HCC)  -     Lipid panel; Future  -     Hemoglobin A1c; Future  -     Comprehensive metabolic panel; Future  -     Vitamin D 25 hydroxy; Future  -     TSH; Future  -     PSA DIAGNOSTIC ONLY; Future  -     CBC w AUTO Differential; Future    5. Obesity (BMI 30-39.9)  -     Lipid panel; Future  -     Hemoglobin A1c; Future  -     Comprehensive metabolic panel; Future  -     Vitamin D 25 hydroxy; Future  -     TSH; Future  -     PSA DIAGNOSTIC ONLY; Future  -     CBC w AUTO Differential; Future    6. Type 2 diabetes mellitus with hyperglycemia, without long-term current use of insulin (HCC)  -     Lipid panel; Future  -     Hemoglobin A1c; Future  -     Comprehensive metabolic panel; Future  -     Vitamin D 25 hydroxy; Future  -     TSH; Future  -     PSA DIAGNOSTIC ONLY; Future  -     CBC w AUTO Differential; Future    7. Vitamin D deficiency  -     Lipid panel; Future  -     Hemoglobin A1c; Future  -     Comprehensive metabolic panel; Future  -      Vitamin D 25 hydroxy; Future  -     TSH; Future  -     PSA DIAGNOSTIC ONLY; Future  -     CBC w AUTO Differential; Future    8. Erectile dysfunction, unspecified erectile dysfunction type  -     Lipid panel; Future  -     Hemoglobin A1c; Future  -     Comprehensive metabolic panel; Future  -     Vitamin D 25 hydroxy; Future  -     TSH; Future  -     PSA DIAGNOSTIC ONLY; Future  -     CBC w AUTO Differential; Future        Return in about 3 months (around 8/17/2022) for Recheck.

## 2022-05-18 ENCOUNTER — OFFICE VISIT (OUTPATIENT)
Dept: SLEEP MEDICINE | Facility: HOSPITAL | Age: 47
End: 2022-05-18

## 2022-05-18 VITALS
BODY MASS INDEX: 36.84 KG/M2 | DIASTOLIC BLOOD PRESSURE: 71 MMHG | SYSTOLIC BLOOD PRESSURE: 103 MMHG | HEIGHT: 73 IN | HEART RATE: 71 BPM | WEIGHT: 278 LBS

## 2022-05-18 DIAGNOSIS — G47.33 OSA (OBSTRUCTIVE SLEEP APNEA): Primary | ICD-10-CM

## 2022-05-18 DIAGNOSIS — E66.9 OBESITY (BMI 30-39.9): ICD-10-CM

## 2022-05-18 PROCEDURE — G0463 HOSPITAL OUTPT CLINIC VISIT: HCPCS

## 2022-05-18 RX ORDER — GLIMEPIRIDE 4 MG/1
TABLET ORAL
Qty: 30 TABLET | Refills: 5 | Status: SHIPPED | OUTPATIENT
Start: 2022-05-18 | End: 2022-07-12 | Stop reason: SDUPTHER

## 2022-05-18 NOTE — PROGRESS NOTES
[facility]  Telephone: [phone] / Fax: [phone] [place]  Telephone: [phone] / Fax: [phone] Miriam Karimi    Referring Physician: Quinn Stoner DO  PCP: Quinn Stoner DO    Reason for consult:  sleep apnea    Santiago Zamora is a 47 y.o.male  was seen in the [facility] today for evaluation of sleep apnea.  He had in lab PSG attempted 15 years ago, could not finish the test due to claustrophobia when CPAP was applied.  Wife reports snoring in all positions with associated apneas.  He wakes up gasping for breath/choking. He feels excessively sleepy/tired during the day.   He has history of a-fib and is on Eliquis. His EF has improved. He is working on life style changes and managed to lose 40 lbs in past 5 years.     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    ROS- +recurrent nose bleeds, +nasal congestion, +TMJ, +irregular HR, +SOA, +anxiety, +depression, rest is negative.    Santiago Zamora  has a past medical history of Anxiety, Cardiomyopathy (HCC), Depression, Diabetes mellitus (HCC), Elevated cholesterol, Hyperlipidemia, Hypertension, and Varicose veins.    Current Medications:    Current Outpatient Medications:   •  1st Choice Lancets Super Thin misc, USE TO TEST GLUCOSE DAILY, Disp: 100 each, Rfl: 5  •  apixaban (ELIQUIS) 5 MG tablet tablet, Take 1 tablet by mouth Every 12 (Twelve) Hours. Indications: Atrial Fibrillation, Disp: 60 tablet, Rfl: 0  •  atorvastatin (LIPITOR) 40 MG tablet, Take 1 tablet by mouth Daily., Disp: 30 tablet, Rfl: 1  •  citalopram (CeleXA) 40 MG tablet, Take 1 tablet by mouth Daily., Disp: 90 tablet, Rfl: 1  •  cyclobenzaprine (FLEXERIL) 10 MG tablet, Take 1 tablet by mouth 3 (Three) Times a Day As Needed for Muscle Spasms., Disp: 90 tablet, Rfl: 0  •  empagliflozin (Jardiance) 25 MG tablet tablet, Take 1 tablet by mouth Daily., Disp: 30 tablet, Rfl: 1  •  glimepiride (AMARYL) 4 MG tablet, TAKE 1 TABLET BY MOUTH  "ONCE DAILY IN THE MORNING BEFORE BREAKFAST, Disp: 30 tablet, Rfl: 5  •  glucose blood (Accu-Chek Rebecca) test strip, Test blood sugar once daily as instructed, Disp: 100 each, Rfl: 5  •  lisinopril (PRINIVIL,ZESTRIL) 20 MG tablet, Take 1 tablet by mouth Daily., Disp: 90 tablet, Rfl: 1  •  sotalol (BETAPACE) 120 MG tablet, Take 1 tablet by mouth Every 12 (Twelve) Hours., Disp: 60 tablet, Rfl: 5    I have reviewed Past Medical History, Past Surgical History, Medication List, Social History and Family History as entered in Sleep Questionnaire and EPIC.    ESS  13   Vital Signs /71   Pulse 71   Ht 185.4 cm (73\")   Wt 126 kg (278 lb)   BMI 36.68 kg/m²  Body mass index is 36.68 kg/m².    General Alert and oriented. No acute distress noted   Pharynx/Throat Class IV  Mallampati airway, large tongue, no evidence of redundant lateral pharyngeal tissue. No oral lesions. No thrush. Moist mucous membranes.   Head Normocephalic. Symmetrical. Atraumatic.    Nose No septal deviation. No drainage   Chest Wall Normal shape. Symmetric expansion with respiration. No tenderness.   Neck Trachea midline, no thyromegaly or adenopathy    Lungs Clear to auscultation bilaterally. No wheezes. No rhonchi. No rales. Respirations regular, even and unlabored.   Heart Regular rhythm and normal rate. Normal S1 and S2. No murmur   Abdomen Soft, non-tender and non-distended. Normal bowel sounds. No masses.   Extremities Moves all extremities well. No edema   Psychiatric Normal mood and affect.        Impression:  1. VIDHYA (obstructive sleep apnea)    2. Obesity (BMI 30-39.9)          Plan:  I discussed the pathophysiology of obstructive sleep apnea with the patient.  We discussed the adverse outcomes associated with untreated sleep-disordered breathing.  We discussed treatment modalities of obstructive sleep apnea including CPAP device. Sleep study will be scheduled to establish a definitive diagnosis of sleep disorder breathing.  Weight loss " will be strongly beneficial in order to reduce the severity of sleep-disordered breathing.  Patient has narrow oropharyngeal structure.  Caution during activities that require prolonged concentration is strongly advised.  After sleep study results are available, patient will be notified, and appointment will be scheduled to discuss sleep study results and treatment recommendations.    Schedule HST due to prior bad experience during in lab PSG.    I appreciate the opportunity to participate in this patient's care.      KIAN Back  [facility]  Phone: [phone]      Part of this note may be an electronic transcription/translation of spoken language to printed text using the Dragon Dictation System. Some errors may exist even though the document was edited.

## 2022-05-20 DIAGNOSIS — I48.91 ATRIAL FIBRILLATION WITH RVR: Primary | ICD-10-CM

## 2022-05-23 ENCOUNTER — TELEPHONE (OUTPATIENT)
Dept: CARDIOLOGY | Facility: CLINIC | Age: 47
End: 2022-05-23

## 2022-05-23 NOTE — TELEPHONE ENCOUNTER
Patient called and stated that he had some questions about his meds.  I have called him back and it went to voicemail.  I was not able to leave a message as his voicemail had not been set up yet.      Tomasa

## 2022-05-24 RX ORDER — METOPROLOL SUCCINATE 50 MG/1
50 TABLET, EXTENDED RELEASE ORAL
Qty: 90 TABLET | Refills: 3 | Status: SHIPPED | OUTPATIENT
Start: 2022-05-24 | End: 2022-06-16 | Stop reason: SDUPTHER

## 2022-05-24 NOTE — TELEPHONE ENCOUNTER
Patient stated that he has been taking his sotalol at 6am and 6pm.  He stated that between the hours of 3-6 he has a high heart rate.  Once he takes is medication it takes a little bit and then it goes back down.  He feels like the medication is not lasting 12 hours.  He would like to know what he should do?  Please advise.    Tomasa

## 2022-05-27 ENCOUNTER — HOSPITAL ENCOUNTER (OUTPATIENT)
Dept: SLEEP MEDICINE | Facility: HOSPITAL | Age: 47
Discharge: HOME | End: 2022-05-27
Admitting: NURSE PRACTITIONER

## 2022-05-27 DIAGNOSIS — G47.33 OSA (OBSTRUCTIVE SLEEP APNEA): ICD-10-CM

## 2022-05-27 PROCEDURE — 95806 SLEEP STUDY UNATT&RESP EFFT: CPT

## 2022-06-06 DIAGNOSIS — E11.65 UNCONTROLLED TYPE 2 DIABETES MELLITUS WITH HYPERGLYCEMIA: ICD-10-CM

## 2022-06-07 RX ORDER — GLIMEPIRIDE 4 MG/1
TABLET ORAL
Qty: 30 TABLET | Refills: 0 | OUTPATIENT
Start: 2022-06-07

## 2022-06-08 ENCOUNTER — TELEPHONE (OUTPATIENT)
Dept: FAMILY MEDICINE CLINIC | Facility: CLINIC | Age: 47
End: 2022-06-08

## 2022-06-08 DIAGNOSIS — E11.65 UNCONTROLLED TYPE 2 DIABETES MELLITUS WITH HYPERGLYCEMIA: ICD-10-CM

## 2022-06-08 RX ORDER — GLIMEPIRIDE 4 MG/1
TABLET ORAL
Qty: 30 TABLET | Refills: 0 | OUTPATIENT
Start: 2022-06-08

## 2022-06-08 RX ORDER — TADALAFIL 5 MG/1
5 TABLET ORAL DAILY PRN
Qty: 30 TABLET | Refills: 5 | Status: SHIPPED | OUTPATIENT
Start: 2022-06-08 | End: 2022-06-22

## 2022-06-08 NOTE — TELEPHONE ENCOUNTER
Pt is asking for a refill of his Tadalafil 5mg but it was D/C'd from med list when he was released from Hospital in April.  Please refill.

## 2022-06-15 ENCOUNTER — TELEPHONE (OUTPATIENT)
Dept: CARDIOLOGY | Facility: CLINIC | Age: 47
End: 2022-06-15

## 2022-06-15 DIAGNOSIS — I48.91 ATRIAL FIBRILLATION WITH RVR: ICD-10-CM

## 2022-06-15 NOTE — TELEPHONE ENCOUNTER
Called and spoke with the patient and told him the above.  He verbalized understanding.  He asked to go ahead and get scheduled with JF.  Maren Olmstead took the call and scheduled the patient for a follow up with JUSTUS.    Tomasa

## 2022-06-15 NOTE — TELEPHONE ENCOUNTER
Please let patient know that I reviewed his [employer] message.  Indeed he was in atrial fibrillation with a heart rate of 134.  If he is feeling okay, there is not anything that we need to do right away.  I would continue with metoprolol, sotalol, and apixaban.    If he has been having symptoms, please arrange for him to be seen by KIAN Higginbotham at the [place] office in the near future.  Thank you.

## 2022-06-15 NOTE — TELEPHONE ENCOUNTER
Patient called and stated that his watch has allerted him twice that he was in A-fib.  He was wanting to know if there is anything that he needs to change with his medications?  Please advise.    I called the patient and he stated that his watch did not show a rhythm strip it just said that he was in a-fib.  He denies CP and SOA.  He stated that he just felt like he had some indigestion.  He stated that he has been taking his medication faithfully and on time.    CB: [phone]    Thanks,  Tomasa

## 2022-06-16 ENCOUNTER — TELEPHONE (OUTPATIENT)
Dept: SLEEP MEDICINE | Facility: HOSPITAL | Age: 47
End: 2022-06-16

## 2022-06-16 ENCOUNTER — OFFICE VISIT (OUTPATIENT)
Dept: CARDIOLOGY | Facility: CLINIC | Age: 47
End: 2022-06-16

## 2022-06-16 VITALS
WEIGHT: 274 LBS | DIASTOLIC BLOOD PRESSURE: 80 MMHG | SYSTOLIC BLOOD PRESSURE: 118 MMHG | BODY MASS INDEX: 36.31 KG/M2 | HEART RATE: 66 BPM | HEIGHT: 73 IN

## 2022-06-16 DIAGNOSIS — I48.0 PAROXYSMAL ATRIAL FIBRILLATION: Primary | ICD-10-CM

## 2022-06-16 DIAGNOSIS — I50.9 CHRONIC CONGESTIVE HEART FAILURE, UNSPECIFIED HEART FAILURE TYPE: ICD-10-CM

## 2022-06-16 DIAGNOSIS — E78.2 MIXED HYPERLIPIDEMIA: ICD-10-CM

## 2022-06-16 DIAGNOSIS — E66.9 OBESITY (BMI 30-39.9): ICD-10-CM

## 2022-06-16 DIAGNOSIS — I10 BENIGN ESSENTIAL HTN: ICD-10-CM

## 2022-06-16 PROCEDURE — 93000 ELECTROCARDIOGRAM COMPLETE: CPT | Performed by: NURSE PRACTITIONER

## 2022-06-16 PROCEDURE — 99214 OFFICE O/P EST MOD 30 MIN: CPT | Performed by: NURSE PRACTITIONER

## 2022-06-16 RX ORDER — METOPROLOL SUCCINATE 50 MG/1
50 TABLET, EXTENDED RELEASE ORAL 2 TIMES DAILY
Qty: 180 TABLET | Refills: 3 | Status: SHIPPED | OUTPATIENT
Start: 2022-06-16 | End: 2022-07-12 | Stop reason: SDUPTHER

## 2022-06-16 RX ORDER — APIXABAN 5 MG/1
TABLET, FILM COATED ORAL
Qty: 60 TABLET | Refills: 5 | Status: SHIPPED | OUTPATIENT
Start: 2022-06-16 | End: 2022-07-12 | Stop reason: SDUPTHER

## 2022-06-16 NOTE — TELEPHONE ENCOUNTER
Called patient to advised HST results are available. Dr. Ozuna requested a follow up visit to discuss results. APT scheduled 6/29/2022.

## 2022-06-16 NOTE — PROGRESS NOTES
Date of Office Visit: 2022  Encounter Provider: KIAN Luque  Place of Service: [facility] CARDIOLOGY  Patient Name: Santiago Zamora  :1975  Primary Cardiologist: Dr. Woods    CC:  Hospital follow up    Dear Dr. Stoner    HPI: Santiago Zamora is a pleasant 47 y.o. male who presents 2022 for cardiac follow up.  He is a new patient to me and I reviewed his past medical record.  He is a patient of Dr. Woods and Dr. Sanders.    He has a past medica lhisotry of  diabetes, HTN, HLD, anxiety, atrial fibrillation and remote history of CHF (reported prior EF of 10%--15 yrs ago in the setting of heavy alcohol use which he has quit completely,) who initially presented to [facility]  with complaints of heart fluttering associated with lightheadedness and dyspnea.     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  He does have VIDHYA but is intolerant to CPAP. He had not seen cardiology in a long time and reportedly was released from them because his EF had improved.      He was noted to be in a WCT--thought to be afib w/LAFB, placed on diltiazem gtt, spontaneously converted to SR-EKG showed SR w/incomplete LBBB.  Echo during that admission showed normal EF--he was dc'd home with o, placed on ASA 81 mg, diltiazem and spironolactone.      Returned to ED 22 with chest pain and palpitations, found again to have AF w/RVR, treated initially with IV amiodarone, converted, transitioned to diltiazem and metoprolol added and started on apixaban. Discharged 4/15/22. Zio showed AF and NSVT---Dr. Woods reviewed with Dr. Sanders, cath recommended and if okay then admit for sotalol initiation further work up/evaluation. His cardiac cath showed normal coronaries and normal EF.     He presented in SR and stated he has been feeling good the prior  few days. Sotalol was initiated.  He has a significantly abnormal baseline EKG, cardiac MRI ws ordered that was normal.   Sotalol started at 160 mg twice daily and then was decreased to 100 mg twice daily.  He tolerated well and was maintaining sinus rhythm.     He called our office 5/24/2022 that stated he felt like the sotalol was wearing off midday and that he was having some palpitations and breakthrough A. fib.  Dr. Woods started him on metoprolol 50 mg to be taken at lunchtime.    He then called our office and also sent a transmission 6/15/2022 that did show that he was in atrial fibrillation.  He was symptomatic with that.  We made him an appointment for the following day.    He presents today with his wife.  He states yesterday was not a good day.  He wanted having to go home from work around 1:00.  He had had 6-7 episodes of atrial fibrillation that lasted anywhere from 15 to 45 minutes with rates as high as 180 bpm.  He was dizzy and lightheaded and nauseated.  He really spent most of the day yesterday in bed.  This morning he woke up and was feeling quite well.  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  During this week it has been very very hot and he has been drinking a lot of water.  He also noted that he was having some mild lower extremity edema.  He states he had been on combination lisinopril/HCTZ but that was stopped among all the other medicine changes.  He questions whether or not he needs to be back on a low-dose of diuretic.  He states when he is having all those episodes of atrial fibrillation he gets very very fatigued.  He denies any chest pain or chest pressure however.  He is compliant with his medications and is taking them at the same time every day.  He takes his sotalol at 6 AM and 6 PM and has been taking the metoprolol 50 mg at noon.       Past Medical History:   Diagnosis Date   • Anxiety    • Cardiomyopathy  (HCC)    • Depression    • Diabetes mellitus (HCC)    • Elevated cholesterol    • Hyperlipidemia    • Hypertension    • Varicose veins        Past Surgical History:   Procedure Laterality Date   • CARDIAC CATHETERIZATION     • CARDIAC CATHETERIZATION N/A 5/2/2022    Procedure: Coronary angiography;  Surgeon: Stanley Ramirez MD;  Location:  [facility] CATH INVASIVE LOCATION;  Service: Cardiovascular;  Laterality: N/A;   • CARDIAC CATHETERIZATION N/A 5/2/2022    Procedure: Left Heart Cath;  Surgeon: Stanley Ramirez MD;  Location:  [facility] CATH INVASIVE LOCATION;  Service: Cardiovascular;  Laterality: N/A;   • CARDIAC CATHETERIZATION N/A 5/2/2022    Procedure: Left ventriculography;  Surgeon: Stanley Ramirez MD;  Location:  [facility] CATH INVASIVE LOCATION;  Service: Cardiovascular;  Laterality: N/A;   • CHOLECYSTECTOMY         Social History     Socioeconomic History   • Marital status:    Tobacco Use   • Smoking status: Never Smoker   • Smokeless tobacco: Never Used   Substance and Sexual Activity   • Alcohol use: No   • Drug use: No   • Sexual activity: Defer       Family History   Problem Relation Age of Onset   • Hypertension Mother    • Hypertension Father    • Atrial fibrillation Father    • Heart attack Father    • Diabetes Other        The following portion of the patient's history were reviewed and updated as appropriate: past medical history, past surgical history, past social history, past family history, allergies, current medications, and problem list.    Review of Systems   Constitutional: Positive for malaise/fatigue (with afib). Negative for diaphoresis and fever.   HENT: Negative for congestion, hearing loss, hoarse voice, nosebleeds and sore throat.    Eyes: Negative for photophobia, vision loss in left eye, vision loss in right eye and visual disturbance.   Cardiovascular: Positive for irregular heartbeat, leg swelling (mild, worse in the heat) and palpitations. Negative for chest pain, dyspnea on  exertion, near-syncope, orthopnea, paroxysmal nocturnal dyspnea and syncope.   Respiratory: Positive for shortness of breath (with afib). Negative for cough, hemoptysis, sleep disturbances due to breathing, snoring, sputum production and wheezing.    Endocrine: Negative for cold intolerance, heat intolerance, polydipsia, polyphagia and polyuria.   Hematologic/Lymphatic: Negative for bleeding problem. Does not bruise/bleed easily.   Skin: Negative for color change, dry skin, poor wound healing, rash and suspicious lesions.   Musculoskeletal: Negative for arthritis, back pain, falls, gout, joint pain, joint swelling, muscle cramps, muscle weakness and myalgias.   Gastrointestinal: Positive for nausea (with rapid afib). Negative for bloating, abdominal pain, constipation, diarrhea, dysphagia, melena and vomiting.   Neurological: Positive for dizziness and light-headedness. Negative for excessive daytime sleepiness, headaches, loss of balance, numbness, paresthesias, seizures, vertigo and weakness.   Psychiatric/Behavioral: Negative for depression, memory loss and substance abuse. The patient is not nervous/anxious.        Allergies   Allergen Reactions   • Eggs Or Egg-Derived Products Swelling   • Fish-Derived Products Swelling         Current Outpatient Medications:   •  1st Choice Lancets Super Thin misc, USE TO TEST GLUCOSE DAILY, Disp: 100 each, Rfl: 5  •  atorvastatin (LIPITOR) 40 MG tablet, Take 1 tablet by mouth Daily., Disp: 30 tablet, Rfl: 1  •  citalopram (CeleXA) 40 MG tablet, Take 1 tablet by mouth Daily., Disp: 90 tablet, Rfl: 1  •  Eliquis 5 MG tablet tablet, TAKE 1 TABLET BY MOUTH EVERY 12 HOURS, Disp: 60 tablet, Rfl: 5  •  empagliflozin (Jardiance) 25 MG tablet tablet, Take 1 tablet by mouth Daily., Disp: 30 tablet, Rfl: 1  •  glimepiride (AMARYL) 4 MG tablet, TAKE 1 TABLET BY MOUTH ONCE DAILY IN THE MORNING BEFORE BREAKFAST, Disp: 30 tablet, Rfl: 5  •  glucose blood (Accu-Chek Rebecca) test strip, Test  "blood sugar once daily as instructed, Disp: 100 each, Rfl: 5  •  lisinopril (PRINIVIL,ZESTRIL) 20 MG tablet, Take 1 tablet by mouth Daily., Disp: 90 tablet, Rfl: 1  •  metoprolol succinate XL (TOPROL-XL) 50 MG 24 hr tablet, Take 1 tablet by mouth Daily With Lunch., Disp: 90 tablet, Rfl: 3  •  sotalol (BETAPACE) 120 MG tablet, Take 1 tablet by mouth Every 12 (Twelve) Hours., Disp: 60 tablet, Rfl: 5  •  tadalafil (Cialis) 5 MG tablet, Take 1 tablet by mouth Daily As Needed for Erectile Dysfunction., Disp: 30 tablet, Rfl: 5        Objective:     Vitals:    06/16/22 1058   BP: 118/80   Pulse: 66   Weight: 124 kg (274 lb)   Height: 185.4 cm (73\")     Body mass index is 36.15 kg/m².      Vitals reviewed.   Constitutional:       General: Not in acute distress.     Appearance: Well-developed and not in distress.   Eyes:      General:         Right eye: No discharge.         Left eye: No discharge.      Conjunctiva/sclera: Conjunctivae normal.   HENT:      Head: Normocephalic and atraumatic.      Right Ear: External ear normal.      Left Ear: External ear normal.      Nose: Nose normal.   Neck:      Thyroid: No thyromegaly.      Vascular: No JVD.      Trachea: No tracheal deviation.      Lymphadenopathy: No cervical adenopathy.   Pulmonary:      Effort: Pulmonary effort is normal. No respiratory distress.      Breath sounds: Normal breath sounds. No wheezing. No rales.   Chest:      Chest wall: Not tender to palpatation.   Cardiovascular:      Normal rate. Regular rhythm.      No gallop.   Pulses:     Intact distal pulses.   Edema:     Peripheral edema absent.   Abdominal:      General: There is no distension.      Palpations: Abdomen is soft.      Tenderness: There is no abdominal tenderness.   Musculoskeletal: Normal range of motion.         General: No tenderness or deformity.      Cervical back: Normal range of motion and neck supple. Skin:     General: Skin is warm and dry.      Findings: No erythema or rash. "   Neurological:      Mental Status: Alert and oriented to person, place, and time.      Coordination: Coordination normal.   Psychiatric:         Attention and Perception: Attention normal.         Mood and Affect: Mood normal.         Speech: Speech normal.         Behavior: Behavior normal. Behavior is cooperative.         Thought Content: Thought content normal.         Cognition and Memory: Cognition normal.         Judgment: Judgment normal.               ECG 12 Lead    Date/Time: 6/16/2022 1:52 PM  Performed by: Rosario Millard APRN  Authorized by: Rosario Millard APRN   Comparison: compared with previous ECG from 5/4/2022  Similar to previous ECG  Rhythm: sinus rhythm  Rate: normal  Conduction: left bundle branch block  Q waves: III and aVF    ST Segments: ST segments normal  T inversion: III, aVF and V6  T flattening: aVR and V5  QRS axis: left    Clinical impression: abnormal EKG              Assessment:       Diagnosis Plan   1. Paroxysmal atrial fibrillation (HCC)     2. Benign essential HTN     3. Chronic congestive heart failure, unspecified heart failure type (HCC)     4. Mixed hyperlipidemia     5. Obesity (BMI 30-39.9)            Plan:       1.  Paroxysmal atrial fibrillation.-  He is having breakthrough episodic atrial fibrillation that he is very symptomatic with.  Yesterday he had 6-7 total events lasting anywhere from 15 to 45 minutes.  He does have these recorded on his watch and downloaded.  He is compliant with his medications and taking metoprolol 50 mg at lunch and sotalol 120 mg twice daily.    2.  Hypertension-well-controlled    3.  CHF- in the heat he has noticed that he will have some lower extremity edema even though he is trying very hard to stay hydrated.  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  He used to be on a combination lisinopril HCTZ and questions whether or not he should be back on a diuretic.  At visit today, trace edema.    4.   Hyperlipidemia-continue lipid-lowering therapy.  He is currently on atorvastatin 40 mg daily.    5.  Obesity- he has lost over 100 pounds.  He is continuing to try to stay active and lose weight.    Going to send a note to Dr. Sanders for his recommendations as patient is having breakthrough symptomatic atrial fibrillation with his current regimen.    Addendum- received a note back from .  He is concerned the patient may be having episodic V. tach/and or A. fib.  He did state it was okay for the patient to take metoprolol 25 mg twice daily with the sotalol.  Patient cannot restart HCTZ as it interacts with the sotalol.  He would like to see some of the other downloads.  I have spoken to the patient with these instructions and have asked him to send in further download so Dr. Sanders  can see them.  Patient takes metoprolol at noon because he felt like he was having breakthrough episodic atrial fibrillation between the 2 doses of the sotalol.  He would like to continue to do that.  Therefore, he will take the metoprolol at noon and 10:00 PM.  Also told him we were going to try to get him an appointment with Dr. Sanders  tomorrow or early next week.  He voiced understanding.    As always, it has been a pleasure to participate in your patient's care. Thank you.       Sincerely,       KIAN Luque      Current Outpatient Medications:   •  1st Choice Lancets Super Thin misc, USE TO TEST GLUCOSE DAILY, Disp: 100 each, Rfl: 5  •  atorvastatin (LIPITOR) 40 MG tablet, Take 1 tablet by mouth Daily., Disp: 30 tablet, Rfl: 1  •  citalopram (CeleXA) 40 MG tablet, Take 1 tablet by mouth Daily., Disp: 90 tablet, Rfl: 1  •  Eliquis 5 MG tablet tablet, TAKE 1 TABLET BY MOUTH EVERY 12 HOURS, Disp: 60 tablet, Rfl: 5  •  empagliflozin (Jardiance) 25 MG tablet tablet, Take 1 tablet by mouth Daily., Disp: 30 tablet, Rfl: 1  •  glimepiride (AMARYL) 4 MG tablet, TAKE 1 TABLET BY MOUTH ONCE DAILY IN THE MORNING BEFORE  BREAKFAST, Disp: 30 tablet, Rfl: 5  •  glucose blood (Accu-Chek Rebecca) test strip, Test blood sugar once daily as instructed, Disp: 100 each, Rfl: 5  •  lisinopril (PRINIVIL,ZESTRIL) 20 MG tablet, Take 1 tablet by mouth Daily., Disp: 90 tablet, Rfl: 1  •  metoprolol succinate XL (TOPROL-XL) 50 MG 24 hr tablet, Take 1 tablet by mouth Daily With Lunch., Disp: 90 tablet, Rfl: 3  •  sotalol (BETAPACE) 120 MG tablet, Take 1 tablet by mouth Every 12 (Twelve) Hours., Disp: 60 tablet, Rfl: 5  •  tadalafil (Cialis) 5 MG tablet, Take 1 tablet by mouth Daily As Needed for Erectile Dysfunction., Disp: 30 tablet, Rfl: 5      Dictated utilizing Dragon dictation

## 2022-06-22 ENCOUNTER — OFFICE VISIT (OUTPATIENT)
Dept: CARDIOLOGY | Facility: CLINIC | Age: 47
End: 2022-06-22

## 2022-06-22 VITALS
HEART RATE: 62 BPM | SYSTOLIC BLOOD PRESSURE: 134 MMHG | BODY MASS INDEX: 37.98 KG/M2 | WEIGHT: 280.4 LBS | HEIGHT: 72 IN | DIASTOLIC BLOOD PRESSURE: 84 MMHG

## 2022-06-22 DIAGNOSIS — I48.91 ATRIAL FIBRILLATION WITH RVR: Primary | ICD-10-CM

## 2022-06-22 DIAGNOSIS — I10 BENIGN ESSENTIAL HTN: ICD-10-CM

## 2022-06-22 PROCEDURE — 93000 ELECTROCARDIOGRAM COMPLETE: CPT

## 2022-06-22 PROCEDURE — 99213 OFFICE O/P EST LOW 20 MIN: CPT

## 2022-06-22 NOTE — PROGRESS NOTES
"Date of Office Visit: 2022  Encounter Provider: KIAN Lopez  Place of Service: [facility] CARDIOLOGY  Patient Name: Santiago Zamora  :1975    CC: Persistent atrial fibrillation      HPI: Santiago Zamora is a 47 y.o. male who is a patient of Dr. Woods and Dr. Sanders.  He has a history of diabetes, htn, HLD, chf, and atrial fibrillation---previously controlled on sotalol.      He presented to the ED on 22 with complaints of chest pain and palpitations.  He was noted to be in a WCT at that time (thought to be AF with LAFB).  He was placed on a dilt drip and converted to NSR.  Echo during this visit showed normal LVEF.  He was discharged with a Zio.     He presented back to the ED on 2022 with cimplaints of chest pain and palpitations.  He was again found to be in AF w/RVR, he was given IV amiodarone and converted to NSR.  He was sent home on diltiazem, metorpolol, and apixaban.  Zio showed AF with periods of NSVT.  He underwent left heart cath which showed normal coronaries.    He was admitted for sotalol initiation in May.  He presented to the hospital in NSR.  Sotalol was started at 160mg Bid and then decreased to 120mg BID.  He was discharged on  .    He called Neftaly LANGSTON on 2022 to report that he felt like the sotalol was \"running\" out mid day and that he was going in and out of AF.  He was started on metoprolol 50mg to be taken at lunch time.     He saw Neftaly in the [place] office on 2022.  He had been experiencing extreme episodes of dizziness, lightheadedness, and nausea while working.  The day before he was seen he said that he was at work and had 6-7 episodes of AF.  He was working outside in the sun and felt like he was staying hydrated but was having a lot of symptomatic AF.  Neftaly contacted our office and ask that we see him for evaluation.                      He presents today for follow up appt.    He says that he is doing " "okay.     He did start having breakthrough episodes of AF while on sotalol about 2-3 weeks ago (~5/24).     He says that he feels like the sotalol was \"wearing off\" around 12pm.  Dr. Mclaughlin started him on 50mg of metoprolol at lunch time. But he was still having episodes at night.    He is now taking 50mg at lunch and 50mg at bedtime and says that he is doing better.  He has only had one episode of AF since then.    When he is in AF he complains of palpitations, fluttering, dizziness, nausea, and fatigue.      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     He denies any chest pain, pnd, edema, or syncope.     He is on sotalol 120mg BID and apixaban for AC.     Past Medical History:   Diagnosis Date   • Anxiety    • Cardiomyopathy (HCC)    • Depression    • Diabetes mellitus (HCC)    • Elevated cholesterol    • Hyperlipidemia    • Hypertension    • Varicose veins        Past Surgical History:   Procedure Laterality Date   • CARDIAC CATHETERIZATION     • CARDIAC CATHETERIZATION N/A 5/2/2022    Procedure: Coronary angiography;  Surgeon: Stanley Ramirez MD;  Location: [facility] INVASIVE LOCATION;  Service: Cardiovascular;  Laterality: N/A;   • CARDIAC CATHETERIZATION N/A 5/2/2022    Procedure: Left Heart Cath;  Surgeon: Stanley Ramirez MD;  Location: [facility] CATH INVASIVE LOCATION;  Service: Cardiovascular;  Laterality: N/A;   • CARDIAC CATHETERIZATION N/A 5/2/2022    Procedure: Left ventriculography;  Surgeon: Stanley Ramirez MD;  Location: [facility] INVASIVE LOCATION;  Service: Cardiovascular;  Laterality: N/A;   • CHOLECYSTECTOMY         Social History     Socioeconomic History   • Marital status:    Tobacco Use   • Smoking status: Never Smoker   • Smokeless tobacco: Never Used   • Tobacco comment: caffeine use- denies   Substance and Sexual Activity   • Alcohol use: No   • Drug use: No   • Sexual activity: " Defer       Family History   Problem Relation Age of Onset   • Hypertension Mother    • Hypertension Father    • Atrial fibrillation Father    • Heart attack Father    • Diabetes Other        Review of Systems   Constitutional: Negative for chills, fever and malaise/fatigue.   Cardiovascular: Negative for chest pain, dyspnea on exertion, leg swelling, near-syncope, orthopnea, palpitations, paroxysmal nocturnal dyspnea and syncope.   Respiratory: Negative for cough and shortness of breath.    Hematologic/Lymphatic: Negative.    Musculoskeletal: Negative for joint pain, joint swelling and myalgias.   Gastrointestinal: Negative for abdominal pain, diarrhea, melena, nausea and vomiting.   Genitourinary: Negative for frequency and hematuria.   Neurological: Negative for light-headedness, numbness, paresthesias and seizures.   Allergic/Immunologic: Negative.    All other systems reviewed and are negative.      Allergies   Allergen Reactions   • Eggs Or Egg-Derived Products Swelling   • Fish-Derived Products Swelling         Current Outpatient Medications:   •  1st Choice Lancets Super Thin misc, USE TO TEST GLUCOSE DAILY, Disp: 100 each, Rfl: 5  •  atorvastatin (LIPITOR) 40 MG tablet, Take 1 tablet by mouth Daily., Disp: 30 tablet, Rfl: 1  •  citalopram (CeleXA) 40 MG tablet, Take 1 tablet by mouth Daily., Disp: 90 tablet, Rfl: 1  •  Eliquis 5 MG tablet tablet, TAKE 1 TABLET BY MOUTH EVERY 12 HOURS, Disp: 60 tablet, Rfl: 5  •  empagliflozin (Jardiance) 25 MG tablet tablet, Take 1 tablet by mouth Daily., Disp: 30 tablet, Rfl: 1  •  glimepiride (AMARYL) 4 MG tablet, TAKE 1 TABLET BY MOUTH ONCE DAILY IN THE MORNING BEFORE BREAKFAST, Disp: 30 tablet, Rfl: 5  •  glucose blood (Accu-Chek Rebecca) test strip, Test blood sugar once daily as instructed, Disp: 100 each, Rfl: 5  •  lisinopril (PRINIVIL,ZESTRIL) 20 MG tablet, Take 1 tablet by mouth Daily., Disp: 90 tablet, Rfl: 1  •  metoprolol succinate XL (TOPROL-XL) 50 MG 24 hr  "tablet, Take 1 tablet by mouth 2 (Two) Times a Day., Disp: 180 tablet, Rfl: 3  •  sotalol (BETAPACE) 120 MG tablet, Take 1 tablet by mouth Every 12 (Twelve) Hours., Disp: 60 tablet, Rfl: 5  •  tadalafil (Cialis) 5 MG tablet, Take 1 tablet by mouth Daily As Needed for Erectile Dysfunction., Disp: 30 tablet, Rfl: 5      Objective:     Vitals:    06/22/22 1251   BP: 134/84   BP Location: Left arm   Pulse: 62   Weight: 127 kg (280 lb 6.4 oz)   Height: 182.9 cm (72\")     Body mass index is 38.03 kg/m².    PHYSICAL EXAM:    Vitals Reviewed.   General Appearance: No acute distress, well developed and well nourished.   Eyes: Conjunctiva and lids: No erythema, swelling, or discharge. Sclera non-icteric.   HENT: Atraumatic, normocephalic. External eyes, ears, and nose normal.   Respiratory: No signs of respiratory distress. Respiration rhythm and depth normal.   Clear to auscultation. No rales, crackles, rhonchi, or wheezing auscultated.   Cardiovascular:  Heart Rate and Rhythm: Normal, Heart Sounds: Normal S1 and S2. No S3 or S4 noted.  Gastrointestinal:  Abdomen soft, non-distended, non-tender.   Musculoskeletal: Normal movement of extremities  Skin: Warm and dry.   Psychiatric: Patient alert and oriented to person, place, and time. Speech and behavior appropriate. Normal mood and affect.       ECG 12 Lead    Date/Time: 6/22/2022 2:38 PM  Performed by: Jose C Petersen APRN  Authorized by: Jose C Petersen APRN   Comparison: compared with previous ECG   Similar to previous ECG  Rhythm: sinus rhythm  BPM: 62                Assessment:       Diagnosis Plan   1. Atrial fibrillation with RVR (HCC)     2. Benign essential HTN            Plan:   Dr. Sanders saw the patient with me today.    1. Atrial fibrillation- he was started on sotalol in May and was doing well.  Over the past couple of weeks he had a few episodes of breakthrough AF. Metoprolol 50mg Bid was added and he feels much better.    2. HTN- currently well controlled " on current medications.  No adjustments at this time.     Dr. Sanders discussed ablation with the patient but since he is doing better with the added metoprolol he would like to see how he does over the next 3 months, he is also focusing on weight loss.  If he does not have any improvement in 3 months then would likely consider ablation.     He will follow up in 3 months.     As always, it has been a pleasure to participate in your patient's care.      Sincerely,         KIAN Lopez

## 2022-06-29 ENCOUNTER — OFFICE VISIT (OUTPATIENT)
Dept: SLEEP MEDICINE | Facility: HOSPITAL | Age: 47
End: 2022-06-29

## 2022-06-29 VITALS
SYSTOLIC BLOOD PRESSURE: 112 MMHG | DIASTOLIC BLOOD PRESSURE: 64 MMHG | HEART RATE: 60 BPM | BODY MASS INDEX: 39.8 KG/M2 | WEIGHT: 278 LBS | HEIGHT: 70 IN | OXYGEN SATURATION: 97 %

## 2022-06-29 DIAGNOSIS — G47.33 OSA (OBSTRUCTIVE SLEEP APNEA): Primary | ICD-10-CM

## 2022-06-29 DIAGNOSIS — I48.91 ATRIAL FIBRILLATION WITH RVR: ICD-10-CM

## 2022-06-29 PROCEDURE — G0463 HOSPITAL OUTPT CLINIC VISIT: HCPCS

## 2022-06-29 NOTE — PROGRESS NOTES
[facility]  Telephone: [phone] / Fax: [phone] [place]  Telephone: [phone] / Fax: [phone] Miriam Karimi    PCP: Quinn Stoner DO    Reason for visit: discuss sleep study results.    Santiago Zamora is a 47 y.o.male  was last seen at [facility] sleep lab in May 2022. He had HST thereafter. It showed overall mild VIDHYA but very severe VIDHYA in supine position. He is here today to discuss sleep study results and treatment recommendations. HE had a bad experience in the past doing an in lab study. He would like to avoid future in lab PSGs if possible. HE agrees to start using a CPAP machine to improve symptoms. Bedtime schedule 8-5:30am    SH no tobacco, no alcohol    ROS negative    Current Medications:    Current Outpatient Medications:   •  1st Choice Lancets Super Thin misc, USE TO TEST GLUCOSE DAILY, Disp: 100 each, Rfl: 5  •  atorvastatin (LIPITOR) 40 MG tablet, Take 1 tablet by mouth Daily., Disp: 30 tablet, Rfl: 1  •  citalopram (CeleXA) 40 MG tablet, Take 1 tablet by mouth Daily., Disp: 90 tablet, Rfl: 1  •  Eliquis 5 MG tablet tablet, TAKE 1 TABLET BY MOUTH EVERY 12 HOURS, Disp: 60 tablet, Rfl: 5  •  empagliflozin (Jardiance) 25 MG tablet tablet, Take 1 tablet by mouth Daily., Disp: 30 tablet, Rfl: 1  •  glimepiride (AMARYL) 4 MG tablet, TAKE 1 TABLET BY MOUTH ONCE DAILY IN THE MORNING BEFORE BREAKFAST, Disp: 30 tablet, Rfl: 5  •  glucose blood (Accu-Chek Rebecca) test strip, Test blood sugar once daily as instructed, Disp: 100 each, Rfl: 5  •  lisinopril (PRINIVIL,ZESTRIL) 20 MG tablet, Take 1 tablet by mouth Daily., Disp: 90 tablet, Rfl: 1  •  metoprolol succinate XL (TOPROL-XL) 50 MG 24 hr tablet, Take 1 tablet by mouth 2 (Two) Times a Day., Disp: 180 tablet, Rfl: 3  •  sotalol (BETAPACE) 120 MG tablet, Take 1 tablet by mouth Every 12 (Twelve) Hours., Disp: 60 tablet, Rfl: 5   also entered in Sleep Questionnaire    Patient  has a past medical history of Anxiety,  "Cardiomyopathy (HCC), Depression, Diabetes mellitus (HCC), Elevated cholesterol, Hyperlipidemia, Hypertension, and Varicose veins.    I have reviewed the Past Medical History, Past Surgical History, Social History and Family History.    Vital Signs /64   Pulse 60   Ht 177.8 cm (70\")   Wt 126 kg (278 lb)   SpO2 97%   BMI 39.89 kg/m²  Body mass index is 39.89 kg/m².    General Alert and oriented. No acute distress noted   Pharynx/Throat Class IV  Mallampati airway, large tongue, no evidence of redundant lateral pharyngeal tissue. No oral lesions. No thrush. Moist mucous membranes.   Head Normocephalic. Symmetrical. Atraumatic.    Nose No septal deviation. No drainage   Chest Wall Normal shape. Symmetric expansion with respiration. No tenderness.   Neck Trachea midline, no thyromegaly or adenopathy    Lungs Clear to auscultation bilaterally. No wheezes. No rhonchi. No rales. Respirations regular, even and unlabored.   Heart Regular rhythm and normal rate. Normal S1 and S2. No murmur   Abdomen Soft, non-tender and non-distended. Normal bowel sounds. No masses.   Extremities Moves all extremities well. No edema   Psychiatric Normal mood and affect.     Testing:    Study-May 2022 Diagnostic findings: The patient tolerated the home sleep testing with monitoring time of 438 minutes. The data obtained make this a technically adequate study. The apnea hypopneas index(AHI) was 11.6 per sleep hour.  The AHI during supine position was 46.2 per sleep hour. Mean heart rate of 61.9 BPM.  Snoring was noted 56.3% of sleep time. Lowest oxygen saturation during the study was 82%. Saturation below 89% was noted for 26.7 mins.     Impression:  1. VIDHYA (obstructive sleep apnea)    2. Atrial fibrillation with RVR (HCC)        Plan  I reviewed VIDHYA pathophysiology and sleep study results with patient. We discussed adverse outcomes associated with untreated VIDHYA. We discussed various modalities of treatment and went through the pros " and cons of each. Oral mandibular advancing device and possible use of an INSPIRE device was discussed.   I  recommend treatment with a positive airway pressure device.       Positive airway pressure devices are most effective management strategy. Various mask interfaces were discussed.  I explained that the most important factor in compliance is a comfortable mask and the profile of the mask on the face (full face / nasal etc.) eventually makes little difference. I reminded her that the device mask can be changed within the first month several times if it does not fit properly. Thereafter it can only be changed every 3 months through insurance.       The pressure on the positive airway pressure machine can feel overwhelming at first.  If this is the case, patient could use the machine while awake, such as while watching television, in order to get used to it.   Hopefully, the face will adjust to the pressures within a few weeks and thereafter will feel far less noticeable.  I explained to the patient the necessity of breathing through the nose and not through the mouth.       Compliance requirements of insurance were discussed and especially the fact that lack of compliance within the first 90 days of at least 4 hours usage nightly may result in repossession of the positive airway pressure device by the durable medical equipment company.  Patient  will then have to have a repeat sleep study prior to getting a new device.     I have prescribed an auto CPAP 5-20cm H2O. He will f/u with Dr Ozuna for compliance recheck after being on the machine for 31-90 days.     I appreciate the opportunity to participate in this patient's care.      KIAN Back  [facility]  Phone: [phone]

## 2022-06-30 ENCOUNTER — TELEPHONE (OUTPATIENT)
Dept: SLEEP MEDICINE | Facility: HOSPITAL | Age: 47
End: 2022-06-30

## 2022-06-30 NOTE — TELEPHONE ENCOUNTER
Faxing order for CPAP to [facility]. Advised patient to call and schedule compliance visit once machine is received. Patient understood.

## 2022-07-12 DIAGNOSIS — I10 BENIGN ESSENTIAL HTN: ICD-10-CM

## 2022-07-12 DIAGNOSIS — E11.65 UNCONTROLLED TYPE 2 DIABETES MELLITUS WITH HYPERGLYCEMIA: ICD-10-CM

## 2022-07-12 DIAGNOSIS — F41.8 DEPRESSION WITH ANXIETY: ICD-10-CM

## 2022-07-12 DIAGNOSIS — I48.91 ATRIAL FIBRILLATION WITH RVR: ICD-10-CM

## 2022-07-12 RX ORDER — CITALOPRAM 40 MG/1
40 TABLET ORAL DAILY
Qty: 90 TABLET | Refills: 0 | Status: SHIPPED | OUTPATIENT
Start: 2022-07-12 | End: 2022-11-28 | Stop reason: SDUPTHER

## 2022-07-12 RX ORDER — METOPROLOL SUCCINATE 50 MG/1
100 TABLET, EXTENDED RELEASE ORAL DAILY
Qty: 180 TABLET | Refills: 3 | Status: SHIPPED | OUTPATIENT
Start: 2022-07-12 | End: 2022-07-19

## 2022-07-12 RX ORDER — GLIMEPIRIDE 4 MG/1
TABLET ORAL
Qty: 30 TABLET | Refills: 3 | Status: SHIPPED | OUTPATIENT
Start: 2022-07-12 | End: 2022-11-28 | Stop reason: SDUPTHER

## 2022-07-12 RX ORDER — LISINOPRIL 20 MG/1
20 TABLET ORAL DAILY
Qty: 90 TABLET | Refills: 0 | Status: SHIPPED | OUTPATIENT
Start: 2022-07-12 | End: 2022-11-28 | Stop reason: SDUPTHER

## 2022-07-12 RX ORDER — SOTALOL HYDROCHLORIDE 120 MG/1
120 TABLET ORAL EVERY 12 HOURS
Qty: 60 TABLET | Refills: 5 | Status: SHIPPED | OUTPATIENT
Start: 2022-07-12 | End: 2022-11-28 | Stop reason: SDUPTHER

## 2022-07-19 RX ORDER — METOPROLOL SUCCINATE 100 MG/1
50 TABLET, EXTENDED RELEASE ORAL 2 TIMES DAILY
Qty: 30 TABLET | Refills: 5 | Status: SHIPPED | OUTPATIENT
Start: 2022-07-19 | End: 2022-11-28 | Stop reason: SDUPTHER

## 2022-08-12 RX ORDER — ATORVASTATIN CALCIUM 40 MG/1
TABLET, FILM COATED ORAL
Qty: 30 TABLET | Refills: 2 | Status: SHIPPED | OUTPATIENT
Start: 2022-08-12 | End: 2022-10-31

## 2022-08-19 DIAGNOSIS — E11.65 UNCONTROLLED TYPE 2 DIABETES MELLITUS WITH HYPERGLYCEMIA: ICD-10-CM

## 2022-10-11 DIAGNOSIS — E11.65 UNCONTROLLED TYPE 2 DIABETES MELLITUS WITH HYPERGLYCEMIA: ICD-10-CM

## 2022-10-11 RX ORDER — EMPAGLIFLOZIN 25 MG/1
TABLET, FILM COATED ORAL
Qty: 30 TABLET | Refills: 0 | Status: SHIPPED | OUTPATIENT
Start: 2022-10-11 | End: 2022-10-31

## 2022-10-19 ENCOUNTER — OFFICE VISIT (OUTPATIENT)
Dept: FAMILY MEDICINE CLINIC | Facility: CLINIC | Age: 47
End: 2022-10-19

## 2022-10-19 VITALS
SYSTOLIC BLOOD PRESSURE: 120 MMHG | TEMPERATURE: 97.6 F | HEIGHT: 70 IN | RESPIRATION RATE: 16 BRPM | BODY MASS INDEX: 40.94 KG/M2 | DIASTOLIC BLOOD PRESSURE: 80 MMHG | WEIGHT: 286 LBS | OXYGEN SATURATION: 98 % | HEART RATE: 84 BPM

## 2022-10-19 DIAGNOSIS — R21 RASH AND NONSPECIFIC SKIN ERUPTION: Primary | ICD-10-CM

## 2022-10-19 DIAGNOSIS — J04.0 LARYNGITIS: ICD-10-CM

## 2022-10-19 PROCEDURE — 99214 OFFICE O/P EST MOD 30 MIN: CPT | Performed by: FAMILY MEDICINE

## 2022-10-19 RX ORDER — DIAPER,BRIEF,INFANT-TODD,DISP
1 EACH MISCELLANEOUS 2 TIMES DAILY
Qty: 56 G | Refills: 1 | Status: SHIPPED | OUTPATIENT
Start: 2022-10-19

## 2022-10-19 RX ORDER — PREDNISONE 20 MG/1
40 TABLET ORAL DAILY
Qty: 14 TABLET | Refills: 0 | Status: SHIPPED | OUTPATIENT
Start: 2022-10-19 | End: 2022-10-26

## 2022-10-19 RX ORDER — CLOTRIMAZOLE 1 %
1 CREAM (GRAM) TOPICAL 2 TIMES DAILY
Qty: 60 G | Refills: 1 | Status: SHIPPED | OUTPATIENT
Start: 2022-10-19

## 2022-10-19 NOTE — PROGRESS NOTES
Chief Complaint   Patient presents with   • Poison Ivy   • Laryngitis       Subjective   Santiago Zamora is a 47 y.o. male.     History of Present Illness   Here today with laryngitis and concern for skin rash    He states concern for poison ivy.  He was out working in the yard.  He has a red patch underneath his armpit where the skin folds.  He describes it as itchy.  Started a week ago.  Hydrocortisone helps very briefly.  No clear vesicles or bumps.  Some scabbing    He went to a football game last week and then lost his voice some congestion and coughing.  That is mostly resolved.  No shortness of breath or red flag symptoms      The following portions of the patient's history were reviewed and updated as appropriate: allergies, current medications, past family history, past medical history, past social history, past surgical history and problem list.      Past Medical History:   Diagnosis Date   • Anxiety    • Cardiomyopathy (HCC)    • Depression    • Diabetes mellitus (HCC)    • Elevated cholesterol    • Hyperlipidemia    • Hypertension    • Varicose veins        Past Surgical History:   Procedure Laterality Date   • CARDIAC CATHETERIZATION     • CARDIAC CATHETERIZATION N/A 5/2/2022    Procedure: Coronary angiography;  Surgeon: Stanley Ramirez MD;  Location: [facility] CATH INVASIVE LOCATION;  Service: Cardiovascular;  Laterality: N/A;   • CARDIAC CATHETERIZATION N/A 5/2/2022    Procedure: Left Heart Cath;  Surgeon: Stanley Ramirez MD;  Location: [facility] CATH INVASIVE LOCATION;  Service: Cardiovascular;  Laterality: N/A;   • CARDIAC CATHETERIZATION N/A 5/2/2022    Procedure: Left ventriculography;  Surgeon: Stanley Ramirez MD;  Location: [facility] CATH INVASIVE LOCATION;  Service: Cardiovascular;  Laterality: N/A;   • CHOLECYSTECTOMY         Family History   Problem Relation Age of Onset   • Hypertension Mother    • Hypertension Father    • Atrial fibrillation Father    • Heart attack Father    • Diabetes Other         Social History     Socioeconomic History   • Marital status:    Tobacco Use   • Smoking status: Never   • Smokeless tobacco: Never   • Tobacco comments:     caffeine use- denies   Substance and Sexual Activity   • Alcohol use: No   • Drug use: No   • Sexual activity: Defer       Current Outpatient Medications on File Prior to Visit   Medication Sig Dispense Refill   • 1st Choice Lancets Super Thin misc USE TO TEST GLUCOSE DAILY 100 each 5   • apixaban (Eliquis) 5 MG tablet tablet Take 1 tablet by mouth Every 12 (Twelve) Hours. 60 tablet 3   • atorvastatin (LIPITOR) 40 MG tablet Take 1 tablet by mouth once daily 30 tablet 2   • citalopram (CeleXA) 40 MG tablet Take 1 tablet by mouth Daily. 90 tablet 0   • glimepiride (AMARYL) 4 MG tablet Take 1 tablet each morning before breakfast. 30 tablet 3   • glucose blood (Accu-Chek Rebecca) test strip Test blood sugar once daily as instructed 100 each 5   • Jardiance 25 MG tablet tablet Take 1 tablet by mouth once daily 30 tablet 0   • lisinopril (PRINIVIL,ZESTRIL) 20 MG tablet Take 1 tablet by mouth Daily. 90 tablet 0   • metoprolol succinate XL (TOPROL-XL) 100 MG 24 hr tablet Take 0.5 tablets by mouth 2 (Two) Times a Day. 30 tablet 5   • sotalol (BETAPACE) 120 MG tablet Take 1 tablet by mouth Every 12 (Twelve) Hours. 60 tablet 5     No current facility-administered medications on file prior to visit.       Review of Systems   Constitutional: Negative for fever.   HENT: Positive for congestion.    Respiratory: Negative for cough.    Cardiovascular: Negative for chest pain.   Gastrointestinal: Negative for abdominal pain.   Neurological: Negative for weakness.   Psychiatric/Behavioral: Negative for depressed mood.           Vitals:    10/19/22 1330   BP: 120/80   Pulse: 84   Resp: 16   Temp: 97.6 °F (36.4 °C)   SpO2: 98%      Objective   Physical Exam  Vitals reviewed.   Cardiovascular:      Rate and Rhythm: Normal rate.      Pulses: Normal pulses.   Pulmonary:       Effort: Pulmonary effort is normal. No respiratory distress.      Breath sounds: No wheezing.   Abdominal:      General: Abdomen is flat.           Assessment & Plan   Problems Addressed this Visit    None  Visit Diagnoses     Rash and nonspecific skin eruption    -  Primary    Relevant Medications    predniSONE (DELTASONE) 20 MG tablet    clotrimazole (Lotrimin AF) 1 % cream    hydrocortisone 1 % ointment    Laryngitis          Diagnoses       Codes Comments    Rash and nonspecific skin eruption    -  Primary ICD-10-CM: R21  ICD-9-CM: 782.1     Laryngitis     ICD-10-CM: J04.0  ICD-9-CM: 464.00         Possible yeast aspect to his rash, will do dual creams cortisone and fungal and get update in several days    Conservative care for Laryngitis, seems to be improving           Asia Moreno MD

## 2022-10-31 DIAGNOSIS — E11.65 UNCONTROLLED TYPE 2 DIABETES MELLITUS WITH HYPERGLYCEMIA: ICD-10-CM

## 2022-10-31 RX ORDER — ATORVASTATIN CALCIUM 40 MG/1
TABLET, FILM COATED ORAL
Qty: 30 TABLET | Refills: 0 | Status: SHIPPED | OUTPATIENT
Start: 2022-10-31 | End: 2022-11-28 | Stop reason: SDUPTHER

## 2022-10-31 RX ORDER — EMPAGLIFLOZIN 25 MG/1
TABLET, FILM COATED ORAL
Qty: 30 TABLET | Refills: 0 | Status: SHIPPED | OUTPATIENT
Start: 2022-10-31 | End: 2022-11-28 | Stop reason: ALTCHOICE

## 2022-11-15 ENCOUNTER — TELEPHONE (OUTPATIENT)
Dept: FAMILY MEDICINE CLINIC | Facility: CLINIC | Age: 47
End: 2022-11-15

## 2022-11-15 NOTE — TELEPHONE ENCOUNTER
Caller: Santiago Zamora    Relationship: Self    Best call back number: [phone]    What medications are you currently taking:   Current Outpatient Medications on File Prior to Visit   Medication Sig Dispense Refill   • 1st Choice Lancets Super Thin misc USE TO TEST GLUCOSE DAILY 100 each 5   • apixaban (Eliquis) 5 MG tablet tablet Take 1 tablet by mouth Every 12 (Twelve) Hours. 60 tablet 3   • atorvastatin (LIPITOR) 40 MG tablet Take 1 tablet by mouth once daily 30 tablet 0   • citalopram (CeleXA) 40 MG tablet Take 1 tablet by mouth Daily. 90 tablet 0   • clotrimazole (Lotrimin AF) 1 % cream Apply 1 application topically to the appropriate area as directed 2 (Two) Times a Day. 60 g 1   • glimepiride (AMARYL) 4 MG tablet Take 1 tablet each morning before breakfast. 30 tablet 3   • glucose blood (Accu-Chek Rebecca) test strip Test blood sugar once daily as instructed 100 each 5   • hydrocortisone 1 % ointment Apply 1 application topically to the appropriate area as directed 2 (Two) Times a Day. 56 g 1   • Jardiance 25 MG tablet tablet Take 1 tablet by mouth once daily 30 tablet 0   • lisinopril (PRINIVIL,ZESTRIL) 20 MG tablet Take 1 tablet by mouth Daily. 90 tablet 0   • metoprolol succinate XL (TOPROL-XL) 100 MG 24 hr tablet Take 0.5 tablets by mouth 2 (Two) Times a Day. 30 tablet 5   • sotalol (BETAPACE) 120 MG tablet Take 1 tablet by mouth Every 12 (Twelve) Hours. 60 tablet 5     No current facility-administered medications on file prior to visit.          When did you start taking these medications:     Which medication are you concerned about: RASHIDA     Who prescribed you this medication: DR PRESLEY   What are your concerns: HAVING TROUBLE AFFORDING MEDICATION, WAS USING PRESCRIPTION CARDS BUT THEY WILL NO LONGER COVER IT. TOTAL COST WOULD BE $575, WOULD LIKE TO KNOW IF THERE ARE ANY SAMPLES AVAILABLE.     How long have you had these concerns: SINCE START

## 2022-11-15 NOTE — TELEPHONE ENCOUNTER
Patient is having trouble affording Jardiance. He would like to know if there is something else that he could be put on that is cheaper?    There are no samples. I have left savings cards at the  for him, however he advised that the cards only drop it approx. $100 and he still can not afford it.    Please Advise

## 2022-11-28 ENCOUNTER — TELEMEDICINE (OUTPATIENT)
Dept: FAMILY MEDICINE CLINIC | Facility: CLINIC | Age: 47
End: 2022-11-28

## 2022-11-28 DIAGNOSIS — I50.9 CHRONIC CONGESTIVE HEART FAILURE, UNSPECIFIED HEART FAILURE TYPE: ICD-10-CM

## 2022-11-28 DIAGNOSIS — I48.91 ATRIAL FIBRILLATION WITH RVR: ICD-10-CM

## 2022-11-28 DIAGNOSIS — F41.8 DEPRESSION WITH ANXIETY: ICD-10-CM

## 2022-11-28 DIAGNOSIS — E55.9 VITAMIN D DEFICIENCY: ICD-10-CM

## 2022-11-28 DIAGNOSIS — E11.65 TYPE 2 DIABETES MELLITUS WITH HYPERGLYCEMIA, WITHOUT LONG-TERM CURRENT USE OF INSULIN: Primary | ICD-10-CM

## 2022-11-28 DIAGNOSIS — I10 BENIGN ESSENTIAL HTN: ICD-10-CM

## 2022-11-28 DIAGNOSIS — E11.65 UNCONTROLLED TYPE 2 DIABETES MELLITUS WITH HYPERGLYCEMIA: ICD-10-CM

## 2022-11-28 DIAGNOSIS — E78.2 MIXED HYPERLIPIDEMIA: ICD-10-CM

## 2022-11-28 PROCEDURE — 99214 OFFICE O/P EST MOD 30 MIN: CPT | Performed by: FAMILY MEDICINE

## 2022-11-28 RX ORDER — SOTALOL HYDROCHLORIDE 120 MG/1
120 TABLET ORAL EVERY 12 HOURS
Qty: 180 TABLET | Refills: 0 | Status: SHIPPED | OUTPATIENT
Start: 2022-11-28

## 2022-11-28 RX ORDER — ATORVASTATIN CALCIUM 40 MG/1
TABLET, FILM COATED ORAL
Qty: 30 TABLET | Refills: 0 | OUTPATIENT
Start: 2022-11-28

## 2022-11-28 RX ORDER — ATORVASTATIN CALCIUM 40 MG/1
40 TABLET, FILM COATED ORAL DAILY
Qty: 30 TABLET | Refills: 0 | Status: CANCELLED | OUTPATIENT
Start: 2022-11-28

## 2022-11-28 RX ORDER — METFORMIN HYDROCHLORIDE 500 MG/1
500 TABLET, EXTENDED RELEASE ORAL
Qty: 90 TABLET | Refills: 1 | Status: SHIPPED | OUTPATIENT
Start: 2022-11-28

## 2022-11-28 RX ORDER — CITALOPRAM 40 MG/1
40 TABLET ORAL DAILY
Qty: 90 TABLET | Refills: 0 | Status: CANCELLED | OUTPATIENT
Start: 2022-11-28

## 2022-11-28 RX ORDER — CITALOPRAM 40 MG/1
40 TABLET ORAL DAILY
Qty: 90 TABLET | Refills: 1 | Status: SHIPPED | OUTPATIENT
Start: 2022-11-28

## 2022-11-28 RX ORDER — DAPAGLIFLOZIN 10 MG/1
TABLET, FILM COATED ORAL
Qty: 30 TABLET | Refills: 5 | Status: SHIPPED | OUTPATIENT
Start: 2022-11-28

## 2022-11-28 RX ORDER — LISINOPRIL 20 MG/1
20 TABLET ORAL DAILY
Qty: 90 TABLET | Refills: 1 | Status: SHIPPED | OUTPATIENT
Start: 2022-11-28

## 2022-11-28 RX ORDER — GLIMEPIRIDE 4 MG/1
TABLET ORAL
Qty: 30 TABLET | Refills: 3 | Status: CANCELLED | OUTPATIENT
Start: 2022-11-28

## 2022-11-28 RX ORDER — CITALOPRAM 40 MG/1
TABLET ORAL
Qty: 30 TABLET | Refills: 0 | OUTPATIENT
Start: 2022-11-28

## 2022-11-28 RX ORDER — METOPROLOL SUCCINATE 100 MG/1
50 TABLET, EXTENDED RELEASE ORAL 2 TIMES DAILY
Qty: 90 TABLET | Refills: 0 | Status: SHIPPED | OUTPATIENT
Start: 2022-11-28

## 2022-11-28 RX ORDER — ATORVASTATIN CALCIUM 40 MG/1
40 TABLET, FILM COATED ORAL DAILY
Qty: 90 TABLET | Refills: 1 | Status: SHIPPED | OUTPATIENT
Start: 2022-11-28

## 2022-11-28 RX ORDER — GLIMEPIRIDE 4 MG/1
TABLET ORAL
Qty: 90 TABLET | Refills: 1 | Status: SHIPPED | OUTPATIENT
Start: 2022-11-28

## 2022-11-28 NOTE — PROGRESS NOTES
Subjective   Santiago Zamora is a 47 y.o. male with No chief complaint on file.  .    History of Present Illness   47-year-old white male with multiple medical issues including type II non-insulin-dependent diabetes mellitus congestive heart failure as well as atrial fibrillation.  Current diabetic medications include glimepiride at 4 mg daily as well as Jardiance at 25 mg daily.  His insurance is not covering Jardiance at an acceptable level and he can afford to keep it up.  He is unaware of any other product in the family that may be covered and or optimal level.  He has tried metformin in the past and thinks that it caused headaches although he cannot remember.  He continues to be morbidly obese although this is a consented video visit and he cannot be weighed or have other vital signs acquired.  He has not had fasting labs in quite some time.  The following portions of the patient's history were reviewed and updated as appropriate: allergies, current medications, past family history, past medical history, past social history, past surgical history and problem list.    Review of Systems   Cardiovascular:        CHF, A. fib, hyperlipidemia   Endocrine:        Morbid exogenous obesity, vitamin D deficiency, type II non-insulin-dependent diabetes mellitus       Objective     There were no vitals filed for this visit.    No results found for this or any previous visit (from the past 672 hour(s)).    Physical Exam  Vitals and nursing note reviewed.   Constitutional:       Appearance: Normal appearance. He is well-developed and well-groomed. He is morbidly obese.      Comments: Morbidly obese although as a video visit he has not been weighed   HENT:      Head: Normocephalic and atraumatic.   Musculoskeletal:      Cervical back: Neck supple.   Skin:     General: Skin is warm and dry.      Findings: No rash.   Neurological:      Mental Status: He is alert and oriented to person, place, and time.   Psychiatric:          Attention and Perception: Attention and perception normal.         Mood and Affect: Mood and affect normal.         Speech: Speech normal.         Behavior: Behavior normal. Behavior is cooperative.         Thought Content: Thought content normal.         Cognition and Memory: Cognition and memory normal.         Judgment: Judgment normal.         Assessment & Plan   Diagnoses and all orders for this visit:    1. Type 2 diabetes mellitus with hyperglycemia, without long-term current use of insulin (HCC) (Primary)  -     dapagliflozin Propanediol (Farxiga) 10 MG tablet; 1 po q am  Dispense: 30 tablet; Refill: 5  -     metFORMIN ER (Glucophage XR) 500 MG 24 hr tablet; Take 1 tablet by mouth Daily With Breakfast.  Dispense: 90 tablet; Refill: 1  -     Comprehensive metabolic panel; Future  -     Hemoglobin A1c; Future  -     PSA DIAGNOSTIC ONLY; Future  -     TSH; Future  -     Vitamin D 25 hydroxy; Future  -     Lipid panel; Future  -     CBC w AUTO Differential; Future    2. Vitamin D deficiency  -     Comprehensive metabolic panel; Future  -     Hemoglobin A1c; Future  -     PSA DIAGNOSTIC ONLY; Future  -     TSH; Future  -     Vitamin D 25 hydroxy; Future  -     Lipid panel; Future  -     CBC w AUTO Differential; Future    3. Atrial fibrillation with RVR (HCC)  -     Comprehensive metabolic panel; Future  -     Hemoglobin A1c; Future  -     PSA DIAGNOSTIC ONLY; Future  -     TSH; Future  -     Vitamin D 25 hydroxy; Future  -     Lipid panel; Future  -     CBC w AUTO Differential; Future    4. Benign essential HTN  -     lisinopril (PRINIVIL,ZESTRIL) 20 MG tablet; Take 1 tablet by mouth Daily.  Dispense: 90 tablet; Refill: 1  -     Comprehensive metabolic panel; Future  -     Hemoglobin A1c; Future  -     PSA DIAGNOSTIC ONLY; Future  -     TSH; Future  -     Vitamin D 25 hydroxy; Future  -     Lipid panel; Future  -     CBC w AUTO Differential; Future    5. Mixed hyperlipidemia  -     atorvastatin (LIPITOR) 40 MG  tablet; Take 1 tablet by mouth Daily.  Dispense: 90 tablet; Refill: 1  -     Comprehensive metabolic panel; Future  -     Hemoglobin A1c; Future  -     PSA DIAGNOSTIC ONLY; Future  -     TSH; Future  -     Vitamin D 25 hydroxy; Future  -     Lipid panel; Future  -     CBC w AUTO Differential; Future    6. Chronic congestive heart failure, unspecified heart failure type (HCC)  -     Comprehensive metabolic panel; Future  -     Hemoglobin A1c; Future  -     PSA DIAGNOSTIC ONLY; Future  -     TSH; Future  -     Vitamin D 25 hydroxy; Future  -     Lipid panel; Future  -     CBC w AUTO Differential; Future    7. Uncontrolled type 2 diabetes mellitus with hyperglycemia (HCC)  -     glimepiride (AMARYL) 4 MG tablet; Take 1 tablet each morning before breakfast.  Dispense: 90 tablet; Refill: 1  -     Comprehensive metabolic panel; Future  -     Hemoglobin A1c; Future  -     PSA DIAGNOSTIC ONLY; Future  -     TSH; Future  -     Vitamin D 25 hydroxy; Future  -     Lipid panel; Future  -     CBC w AUTO Differential; Future    8. Depression with anxiety  -     citalopram (CeleXA) 40 MG tablet; Take 1 tablet by mouth Daily.  Dispense: 90 tablet; Refill: 1  -     Comprehensive metabolic panel; Future  -     Hemoglobin A1c; Future  -     PSA DIAGNOSTIC ONLY; Future  -     TSH; Future  -     Vitamin D 25 hydroxy; Future  -     Lipid panel; Future  -     CBC w AUTO Differential; Future    Glimepiride will continue without alteration.  Jardiance will be discontinued and Farxiga will be prescribed to replace this.  We will add metformin ER at 500 mg daily back into his regimen.  Weight loss is essential as well as improved dietary habits and increase activity.  Labs will be drawn at the end of March to mid April with follow-up in this office with me thereafter.  Consented video visit required 15 minutes for completion.    Return in about 4 months (around 3/28/2023) for Recheck.

## 2023-02-09 ENCOUNTER — OFFICE VISIT (OUTPATIENT)
Dept: FAMILY MEDICINE CLINIC | Facility: CLINIC | Age: 48
End: 2023-02-09
Payer: COMMERCIAL

## 2023-02-09 VITALS
HEIGHT: 70 IN | BODY MASS INDEX: 44.24 KG/M2 | SYSTOLIC BLOOD PRESSURE: 120 MMHG | DIASTOLIC BLOOD PRESSURE: 82 MMHG | WEIGHT: 309 LBS | TEMPERATURE: 97.8 F | HEART RATE: 98 BPM | OXYGEN SATURATION: 97 %

## 2023-02-09 DIAGNOSIS — M25.50 ARTHRALGIA, UNSPECIFIED JOINT: Primary | ICD-10-CM

## 2023-02-09 PROCEDURE — 99214 OFFICE O/P EST MOD 30 MIN: CPT | Performed by: NURSE PRACTITIONER

## 2023-02-09 PROCEDURE — 96372 THER/PROPH/DIAG INJ SC/IM: CPT | Performed by: NURSE PRACTITIONER

## 2023-02-09 RX ORDER — TRIAMCINOLONE ACETONIDE 40 MG/ML
40 INJECTION, SUSPENSION INTRA-ARTICULAR; INTRAMUSCULAR ONCE
Status: COMPLETED | OUTPATIENT
Start: 2023-02-09 | End: 2023-02-09

## 2023-02-09 RX ORDER — METHYLPREDNISOLONE 4 MG/1
TABLET ORAL
Qty: 21 EACH | Refills: 0 | Status: SHIPPED | OUTPATIENT
Start: 2023-02-09 | End: 2023-02-21

## 2023-02-09 RX ORDER — CYCLOBENZAPRINE HCL 10 MG
10 TABLET ORAL 3 TIMES DAILY PRN
Qty: 30 TABLET | Refills: 0 | Status: SHIPPED | OUTPATIENT
Start: 2023-02-09

## 2023-02-09 RX ORDER — TADALAFIL 5 MG/1
5 TABLET ORAL DAILY PRN
COMMUNITY
Start: 2023-01-24

## 2023-02-09 RX ADMIN — TRIAMCINOLONE ACETONIDE 40 MG: 40 INJECTION, SUSPENSION INTRA-ARTICULAR; INTRAMUSCULAR at 14:28

## 2023-02-09 NOTE — PROGRESS NOTES
Patient ID: Santiago Zamora is a 47 y.o. male     Patient Care Team:  Quinn Stoner DO as PCP - General (Family Medicine)  Quinn Stoner DO    Subjective     Chief Complaint   Patient presents with   • joint pain       History of Present Illness    Santiago Zamora presents to [facility] today for joint pain all over.      Works :  [employer]ate [employer] - involves walking and heavy lifting.    Joint pain all over for years.  Worse over the past year.    Some days are worse than others.   Tuesday, he had difficulty going to bed due to joint pain.   No known injury.    Rates discomfort at 5 on 0-10 scale.  Increases to 10/10 at worst.      Parents have arthritis.  Mother has fibromyalgia.      He denies any complaints of fever, chills, cough, chest pain, shortness of air, abdominal pain, nausea, or any other concerns.     The following portions of the patient's history were reviewed and updated as appropriate: allergies, current medications, past family history, past medical history, past social history, past surgical history and problem list.       Review of Systems   Constitutional: Negative for malaise/fatigue.   Musculoskeletal: Positive for arthritis, back pain, joint pain, joint swelling and stiffness. Negative for muscle weakness, myalgias and neck pain.       Vitals:    02/09/23 1353   BP: 120/82   Pulse: 98   Temp: 97.8 °F (36.6 °C)   SpO2: 97%       Documented weights    02/09/23 1353   Weight: (!) 140 kg (309 lb)     Body mass index is 44.34 kg/m².        Objective     Physical Exam  Vitals reviewed.   Constitutional:       General: He is not in acute distress.  Cardiovascular:      Rate and Rhythm: Normal rate.   Pulmonary:      Effort: Pulmonary effort is normal.   Musculoskeletal:         General: Swelling (joint swelling in hands) and tenderness present. No deformity.      Right lower leg: No edema.      Left lower leg: No edema.   Neurological:      Mental Status: He is  alert and oriented to person, place, and time.            Assessment & Plan     Assessment/Plan     Diagnoses and all orders for this visit:    1. Arthralgia, unspecified joint (Primary)  -     LINDSEY Direct Reflex to 11 Biomarker  -     Uric acid  -     RHEUMATOID FACTOR  -     Sedimentation Rate  -     C-reactive protein  -     triamcinolone acetonide (KENALOG-40) injection 40 mg    Other orders  -     methylPREDNISolone (MEDROL) 4 MG dose pack; Take as directed on package instructions.  Dispense: 21 each; Refill: 0  -     cyclobenzaprine (FLEXERIL) 10 MG tablet; Take 1 tablet by mouth 3 (Three) Times a Day As Needed for Muscle Spasms.  Dispense: 30 tablet; Refill: 0      Awaiting labs results to determine further recommendations.      In the meantime, instructed to contact us sooner for any problems or concerns.    Follow Up:  Return if symptoms worsen or fail to improve.    Patient was given instructions and counseling regarding condition or for health maintenance advice.  Please see specific information pulled into the AVS if appropriate.      Patient was wearing facemask when I entered the room and throughout our encounter. Protective equipment was worn throughout this patient encounter including a face mask.  Hand hygiene was performed before donning protective equipment and after removal when leaving the room.     Argentina Mehta, APRN  Family Medicine  [facility] [place]  02/10/23  14:21 EST

## 2023-02-10 DIAGNOSIS — M25.50 ARTHRALGIA, UNSPECIFIED JOINT: Primary | ICD-10-CM

## 2023-02-10 LAB
ANA SER QL: NEGATIVE
CRP SERPL-MCNC: 1.21 MG/DL (ref 0–0.5)
ERYTHROCYTE [SEDIMENTATION RATE] IN BLOOD BY WESTERGREN METHOD: 17 MM/HR (ref 0–15)
RHEUMATOID FACT SERPL-ACNC: <10 IU/ML
URATE SERPL-MCNC: 5 MG/DL (ref 3.4–7)

## 2023-02-21 ENCOUNTER — OFFICE VISIT (OUTPATIENT)
Dept: FAMILY MEDICINE CLINIC | Facility: CLINIC | Age: 48
End: 2023-02-21
Payer: COMMERCIAL

## 2023-02-21 VITALS
TEMPERATURE: 97.3 F | HEIGHT: 70 IN | OXYGEN SATURATION: 98 % | SYSTOLIC BLOOD PRESSURE: 116 MMHG | HEART RATE: 79 BPM | WEIGHT: 307 LBS | BODY MASS INDEX: 43.95 KG/M2 | DIASTOLIC BLOOD PRESSURE: 84 MMHG

## 2023-02-21 DIAGNOSIS — M15.9 PRIMARY OSTEOARTHRITIS INVOLVING MULTIPLE JOINTS: Primary | ICD-10-CM

## 2023-02-21 PROCEDURE — 99213 OFFICE O/P EST LOW 20 MIN: CPT | Performed by: FAMILY MEDICINE

## 2023-02-21 RX ORDER — CELECOXIB 200 MG/1
200 CAPSULE ORAL 2 TIMES DAILY
Qty: 60 CAPSULE | Refills: 5 | Status: SHIPPED | OUTPATIENT
Start: 2023-02-21

## 2023-02-21 NOTE — PROGRESS NOTES
Subjective   Santiago Zamora is a 47 y.o. male with   Chief Complaint   Patient presents with   • Diabetes   .    History of Present Illness   47-year-old white male with multiple medical issues here primarily for further discussion in regards to arthralgias and myalgias.  Patient states that he has multiple joints that ache including wrists, hands, knees and hips.  He did play football in high school and has always been active hiking and climbing.  Patient saw our nurse practitioner earlier and labs were performed.  Current medications are multiple including Eliquis for paroxysmal atrial fibrillation.  He is currently doing well today with no evidence of joint swelling, morning stiffness, increased rubor or calor.  The following portions of the patient's history were reviewed and updated as appropriate: allergies, current medications, past family history, past medical history, past social history, past surgical history and problem list.    Review of Systems   Musculoskeletal: Positive for arthralgias.       Objective     Vitals:    02/21/23 0802   BP: 116/84   Pulse: 79   Temp: 97.3 °F (36.3 °C)   SpO2: 98%       Recent Results (from the past 672 hour(s))   LINDSEY Direct Reflex to 11 [employer]arker    Collection Time: 02/09/23  2:31 PM    Specimen: Blood   Result Value Ref Range    LINDSEY Direct Negative Negative   Uric acid    Collection Time: 02/09/23  2:31 PM    Specimen: Blood   Result Value Ref Range    Uric Acid 5.0 3.4 - 7.0 mg/dL   RHEUMATOID FACTOR    Collection Time: 02/09/23  2:31 PM    Specimen: Blood   Result Value Ref Range    RA Latex Turbid <10.0 <14.0 IU/mL   Sedimentation Rate    Collection Time: 02/09/23  2:31 PM    Specimen: Blood   Result Value Ref Range    Sed Rate 17 (H) 0 - 15 mm/hr   C-reactive protein    Collection Time: 02/09/23  2:31 PM    Specimen: Blood   Result Value Ref Range    C-Reactive Protein 1.21 (H) 0.00 - 0.50 mg/dL       Physical Exam  Vitals and nursing note reviewed.   Constitutional:        Appearance: Normal appearance. He is well-developed and well-groomed.   HENT:      Head: Normocephalic and atraumatic.   Neck:      Thyroid: No thyroid mass or thyromegaly.      Vascular: Normal carotid pulses. No carotid bruit.      Trachea: Trachea and phonation normal.   Cardiovascular:      Rate and Rhythm: Normal rate and regular rhythm.      Heart sounds: Normal heart sounds. No murmur heard.    No friction rub. No gallop.   Pulmonary:      Effort: Pulmonary effort is normal. No respiratory distress.      Breath sounds: Normal breath sounds. No decreased breath sounds, wheezing, rhonchi or rales.   Musculoskeletal:      Cervical back: Neck supple.      Comments: Hands and wrist are evaluated with no evidence of soft tissue swelling increased erythema or increased local heat.  These areas demonstrate full range of motion with motor 5/5 neurovascular intact distally.   Lymphadenopathy:      Cervical: No cervical adenopathy.   Skin:     General: Skin is warm and dry.      Findings: No rash.   Neurological:      Mental Status: He is alert and oriented to person, place, and time.      Sensory: Sensation is intact.      Motor: Motor function is intact.   Psychiatric:         Attention and Perception: Attention and perception normal.         Mood and Affect: Mood and affect normal.         Speech: Speech normal.         Behavior: Behavior normal. Behavior is cooperative.         Thought Content: Thought content normal.         Cognition and Memory: Cognition and memory normal.         Judgment: Judgment normal.         Assessment & Plan   Diagnoses and all orders for this visit:    1. Primary osteoarthritis involving multiple joints (Primary)  -     celecoxib (CeleBREX) 200 MG capsule; Take 1 capsule by mouth 2 (Two) Times a Day.  Dispense: 60 capsule; Refill: 5    Have recommended over-the-counter glucosamine as well as turmeric in regards to what is considered to be osteoarthritis at multiple levels.  In  spite of the use of Eliquis we will add Celebrex since it will affect platelets the least of any other product in this family.  Have recommended patient initially try 1/day and only to use 2 if absolutely needed.  He will follow-up in this office with me preceded by fasting labs as previously scheduled.    Return for Next scheduled follow up.

## 2023-04-17 DIAGNOSIS — I48.91 ATRIAL FIBRILLATION WITH RVR: ICD-10-CM

## 2023-04-17 DIAGNOSIS — E11.65 UNCONTROLLED TYPE 2 DIABETES MELLITUS WITH HYPERGLYCEMIA: ICD-10-CM

## 2023-04-17 RX ORDER — GLIMEPIRIDE 4 MG/1
TABLET ORAL
Qty: 90 TABLET | Refills: 1 | Status: SHIPPED | OUTPATIENT
Start: 2023-04-17

## 2023-05-13 NOTE — TELEPHONE ENCOUNTER
PT WIFE CALLED STATING THE INSURANCE WILL NOT COVER THE [facility]. IT WILL BE OVER 500 DOLLARS.    SHE ALSO STATES HE NEEDS THE MUSCLE RELAXER CALLED IN. SHE DID NOT KNOW THE NAME OF IT.    Physical Therapy Treatment Note/Plan of Care    Room #:  9824/9532-23  Patient Name: Ambreen Gonzales  YOB: 1930  MRN: [id number]    Date of Service: 5/13/05/11/23 1915,   End:  05/11/23 1915,   [facility] [address]    Hip fracture requiring operative repair, left, closed, initial encounter ([facility] [address]    Closed fracture of left hip ([facility] [address]    Pernicious anemia    Acute kidney injury (nontraumatic) ([facility] [address]        

## 2023-05-18 DIAGNOSIS — F41.8 DEPRESSION WITH ANXIETY: ICD-10-CM

## 2023-05-18 DIAGNOSIS — E11.65 UNCONTROLLED TYPE 2 DIABETES MELLITUS WITH HYPERGLYCEMIA: ICD-10-CM

## 2023-05-18 DIAGNOSIS — E78.2 MIXED HYPERLIPIDEMIA: ICD-10-CM

## 2023-05-18 DIAGNOSIS — E11.65 TYPE 2 DIABETES MELLITUS WITH HYPERGLYCEMIA, WITHOUT LONG-TERM CURRENT USE OF INSULIN: ICD-10-CM

## 2023-05-18 RX ORDER — ATORVASTATIN CALCIUM 40 MG/1
40 TABLET, FILM COATED ORAL DAILY
Qty: 90 TABLET | Refills: 1 | OUTPATIENT
Start: 2023-05-18

## 2023-05-18 RX ORDER — ATORVASTATIN CALCIUM 40 MG/1
TABLET, FILM COATED ORAL
Qty: 90 TABLET | Refills: 0 | Status: SHIPPED | OUTPATIENT
Start: 2023-05-18

## 2023-05-18 RX ORDER — GLIMEPIRIDE 4 MG/1
TABLET ORAL
Qty: 90 TABLET | Refills: 1 | OUTPATIENT
Start: 2023-05-18

## 2023-05-18 RX ORDER — METFORMIN HYDROCHLORIDE 500 MG/1
500 TABLET, EXTENDED RELEASE ORAL
Qty: 90 TABLET | Refills: 1 | OUTPATIENT
Start: 2023-05-18

## 2023-05-18 RX ORDER — CITALOPRAM 40 MG/1
40 TABLET ORAL DAILY
Qty: 90 TABLET | Refills: 1 | OUTPATIENT
Start: 2023-05-18

## 2023-05-18 RX ORDER — CITALOPRAM 40 MG/1
TABLET ORAL
Qty: 90 TABLET | Refills: 0 | Status: SHIPPED | OUTPATIENT
Start: 2023-05-18

## 2023-05-18 RX ORDER — METFORMIN HYDROCHLORIDE 500 MG/1
TABLET, EXTENDED RELEASE ORAL
Qty: 90 TABLET | Refills: 0 | Status: SHIPPED | OUTPATIENT
Start: 2023-05-18

## 2023-07-26 ENCOUNTER — OFFICE VISIT (OUTPATIENT)
Dept: FAMILY MEDICINE CLINIC | Facility: CLINIC | Age: 48
End: 2023-07-26
Payer: COMMERCIAL

## 2023-07-26 VITALS
HEART RATE: 70 BPM | HEIGHT: 70 IN | DIASTOLIC BLOOD PRESSURE: 88 MMHG | WEIGHT: 315 LBS | TEMPERATURE: 96.8 F | OXYGEN SATURATION: 97 % | BODY MASS INDEX: 45.1 KG/M2 | SYSTOLIC BLOOD PRESSURE: 130 MMHG

## 2023-07-26 DIAGNOSIS — I10 BENIGN ESSENTIAL HTN: Primary | ICD-10-CM

## 2023-07-26 DIAGNOSIS — E11.65 TYPE 2 DIABETES MELLITUS WITH HYPERGLYCEMIA, WITHOUT LONG-TERM CURRENT USE OF INSULIN: ICD-10-CM

## 2023-07-26 DIAGNOSIS — F41.8 DEPRESSION WITH ANXIETY: ICD-10-CM

## 2023-07-26 DIAGNOSIS — E78.2 MIXED HYPERLIPIDEMIA: ICD-10-CM

## 2023-07-26 DIAGNOSIS — I48.0 PAROXYSMAL ATRIAL FIBRILLATION: ICD-10-CM

## 2023-07-26 DIAGNOSIS — I48.91 ATRIAL FIBRILLATION WITH RVR: ICD-10-CM

## 2023-07-26 DIAGNOSIS — E55.9 VITAMIN D DEFICIENCY: ICD-10-CM

## 2023-07-26 DIAGNOSIS — E66.01 MORBID EXOGENOUS OBESITY: ICD-10-CM

## 2023-07-26 PROCEDURE — 99214 OFFICE O/P EST MOD 30 MIN: CPT | Performed by: FAMILY MEDICINE

## 2023-07-26 RX ORDER — DAPAGLIFLOZIN 10 MG/1
TABLET, FILM COATED ORAL
Qty: 30 TABLET | Refills: 5 | Status: SHIPPED | OUTPATIENT
Start: 2023-07-26

## 2023-07-26 RX ORDER — ATORVASTATIN CALCIUM 40 MG/1
40 TABLET, FILM COATED ORAL DAILY
Qty: 90 TABLET | Refills: 1 | Status: SHIPPED | OUTPATIENT
Start: 2023-07-26

## 2023-07-26 RX ORDER — CITALOPRAM 40 MG/1
40 TABLET ORAL DAILY
Qty: 90 TABLET | Refills: 1 | Status: SHIPPED | OUTPATIENT
Start: 2023-07-26

## 2023-07-26 RX ORDER — METFORMIN HYDROCHLORIDE 500 MG/1
500 TABLET, EXTENDED RELEASE ORAL
Qty: 90 TABLET | Refills: 1 | Status: SHIPPED | OUTPATIENT
Start: 2023-07-26

## 2023-07-26 RX ORDER — SEMAGLUTIDE 0.68 MG/ML
0.25 INJECTION, SOLUTION SUBCUTANEOUS WEEKLY
Qty: 3 ML | Refills: 0 | Status: SHIPPED | OUTPATIENT
Start: 2023-07-26

## 2023-07-27 LAB
25(OH)D3+25(OH)D2 SERPL-MCNC: 15.8 NG/ML (ref 30–100)
ALBUMIN SERPL-MCNC: 4.1 G/DL (ref 3.5–5.2)
ALBUMIN/GLOB SERPL: 2.2 G/DL
ALP SERPL-CCNC: 71 U/L (ref 39–117)
ALT SERPL-CCNC: 20 U/L (ref 1–41)
AST SERPL-CCNC: 13 U/L (ref 1–40)
BASOPHILS # BLD AUTO: 0.06 10*3/MM3 (ref 0–0.2)
BASOPHILS NFR BLD AUTO: 1 % (ref 0–1.5)
BILIRUB SERPL-MCNC: 0.4 MG/DL (ref 0–1.2)
BUN SERPL-MCNC: 13 MG/DL (ref 6–20)
BUN/CREAT SERPL: 19.4 (ref 7–25)
CALCIUM SERPL-MCNC: 9.5 MG/DL (ref 8.6–10.5)
CHLORIDE SERPL-SCNC: 96 MMOL/L (ref 98–107)
CHOLEST SERPL-MCNC: 134 MG/DL (ref 0–200)
CO2 SERPL-SCNC: 27.9 MMOL/L (ref 22–29)
CREAT SERPL-MCNC: 0.67 MG/DL (ref 0.76–1.27)
EGFRCR SERPLBLD CKD-EPI 2021: 116.6 ML/MIN/1.73
EOSINOPHIL # BLD AUTO: 0.22 10*3/MM3 (ref 0–0.4)
EOSINOPHIL NFR BLD AUTO: 3.5 % (ref 0.3–6.2)
ERYTHROCYTE [DISTWIDTH] IN BLOOD BY AUTOMATED COUNT: 14.1 % (ref 12.3–15.4)
GLOBULIN SER CALC-MCNC: 1.9 GM/DL
GLUCOSE SERPL-MCNC: 260 MG/DL (ref 65–99)
HBA1C MFR BLD: 10.3 % (ref 4.8–5.6)
HCT VFR BLD AUTO: 48.3 % (ref 37.5–51)
HDLC SERPL-MCNC: 31 MG/DL (ref 40–60)
HGB BLD-MCNC: 15.5 G/DL (ref 13–17.7)
IMM GRANULOCYTES # BLD AUTO: 0.04 10*3/MM3 (ref 0–0.05)
IMM GRANULOCYTES NFR BLD AUTO: 0.6 % (ref 0–0.5)
LDLC SERPL CALC-MCNC: 65 MG/DL (ref 0–100)
LYMPHOCYTES # BLD AUTO: 1.38 10*3/MM3 (ref 0.7–3.1)
LYMPHOCYTES NFR BLD AUTO: 21.9 % (ref 19.6–45.3)
MCH RBC QN AUTO: 26.2 PG (ref 26.6–33)
MCHC RBC AUTO-ENTMCNC: 32.1 G/DL (ref 31.5–35.7)
MCV RBC AUTO: 81.7 FL (ref 79–97)
MONOCYTES # BLD AUTO: 0.48 10*3/MM3 (ref 0.1–0.9)
MONOCYTES NFR BLD AUTO: 7.6 % (ref 5–12)
NEUTROPHILS # BLD AUTO: 4.13 10*3/MM3 (ref 1.7–7)
NEUTROPHILS NFR BLD AUTO: 65.4 % (ref 42.7–76)
NRBC BLD AUTO-RTO: 0 /100 WBC (ref 0–0.2)
PLATELET # BLD AUTO: 183 10*3/MM3 (ref 140–450)
POTASSIUM SERPL-SCNC: 5 MMOL/L (ref 3.5–5.2)
PROT SERPL-MCNC: 6 G/DL (ref 6–8.5)
PSA SERPL-MCNC: 1.61 NG/ML (ref 0–4)
RBC # BLD AUTO: 5.91 10*6/MM3 (ref 4.14–5.8)
SODIUM SERPL-SCNC: 136 MMOL/L (ref 136–145)
TRIGL SERPL-MCNC: 236 MG/DL (ref 0–150)
TSH SERPL DL<=0.005 MIU/L-ACNC: 1.09 UIU/ML (ref 0.27–4.2)
VLDLC SERPL CALC-MCNC: 38 MG/DL (ref 5–40)
WBC # BLD AUTO: 6.31 10*3/MM3 (ref 3.4–10.8)

## 2023-07-27 NOTE — PROGRESS NOTES
Subjective   Santiago Zamora is a 48 y.o. male with   Chief Complaint   Patient presents with    Hypertension    Hyperlipidemia    Diabetes   .    Hypertension    Hyperlipidemia    Diabetes     48-year-old white male with known history of essential hypertension and hyperlipidemia here for further medical management.  Patient also suffers from type II non-insulin-dependent diabetes mellitus.  There is a history of morbid exogenous obesity.  He does not check his sugars.  There is a history of congestive heart failure as well and has paroxysmal atrial fibrillation as well as ventricular tachycardia.  Current medications include Eliquis, atorvastatin, Celebrex as well as citalopram, Flexeril and Farxiga.  Patient is also using glimepiride, lisinopril and metformin ER.  Toprol-XL is also used as well as sotalol and tadalafil.  He questions whether there are other products that could be used for sugar that would also offer him weight loss.  He is willing to inject himself.  His last full set of fasting labs in this office was in March 2020.  He is willing to check his sugars 2-3 times a week on a fasting basis.  The following portions of the patient's history were reviewed and updated as appropriate: allergies, current medications, past family history, past medical history, past social history, past surgical history and problem list.    Review of Systems   Cardiovascular:         Hypertension, hyperlipidemia, CHF, paroxysmal atrial fibrillation, ventricular tachycardia   Endocrine:        Morbid exogenous obesity, vitamin D deficiency, type II non-insulin-dependent diabetes mellitus   Genitourinary:         Erectile dysfunction     Objective     Vitals:    07/26/23 0752   BP: 130/88   Pulse: 70   Temp: 96.8 °F (36 °C)   SpO2: 97%       No results found for this or any previous visit (from the past 672 hour(s)).    Physical Exam  Vitals and nursing note reviewed.   Constitutional:       Appearance: Normal appearance. He is  well-developed and well-groomed. He is morbidly obese.      Comments: Morbid exogenous obesity with a BMI of 45.8   HENT:      Head: Normocephalic and atraumatic.   Neck:      Thyroid: No thyroid mass or thyromegaly.      Vascular: Normal carotid pulses. No carotid bruit.      Trachea: Trachea and phonation normal.   Cardiovascular:      Rate and Rhythm: Normal rate and regular rhythm.      Heart sounds: Normal heart sounds. No murmur heard.    No friction rub. No gallop.   Pulmonary:      Effort: Pulmonary effort is normal. No respiratory distress.      Breath sounds: Normal breath sounds. No decreased breath sounds, wheezing, rhonchi or rales.   Musculoskeletal:      Cervical back: Neck supple.   Lymphadenopathy:      Cervical: No cervical adenopathy.   Skin:     General: Skin is warm and dry.      Findings: No rash.   Neurological:      Mental Status: He is alert and oriented to person, place, and time.   Psychiatric:         Attention and Perception: Attention and perception normal.         Mood and Affect: Mood and affect normal.         Speech: Speech normal.         Behavior: Behavior normal. Behavior is cooperative.         Thought Content: Thought content normal.         Cognition and Memory: Cognition and memory normal.         Judgment: Judgment normal.       Assessment & Plan   Diagnoses and all orders for this visit:    1. Benign essential HTN (Primary)  -     Comprehensive metabolic panel  -     Vitamin D 25 hydroxy  -     TSH  -     Lipid panel  -     Hemoglobin A1c  -     PSA DIAGNOSTIC ONLY  -     CBC w AUTO Differential    2. Mixed hyperlipidemia  -     Comprehensive metabolic panel  -     Vitamin D 25 hydroxy  -     TSH  -     Lipid panel  -     Hemoglobin A1c  -     PSA DIAGNOSTIC ONLY  -     CBC w AUTO Differential  -     atorvastatin (LIPITOR) 40 MG tablet; Take 1 tablet by mouth Daily.  Dispense: 90 tablet; Refill: 1    3. Paroxysmal atrial fibrillation  -     Comprehensive metabolic panel  -      Vitamin D 25 hydroxy  -     TSH  -     Lipid panel  -     Hemoglobin A1c  -     PSA DIAGNOSTIC ONLY  -     CBC w AUTO Differential    4. Morbid exogenous obesity  -     Comprehensive metabolic panel  -     Vitamin D 25 hydroxy  -     TSH  -     Lipid panel  -     Hemoglobin A1c  -     PSA DIAGNOSTIC ONLY  -     CBC w AUTO Differential    5. Type 2 diabetes mellitus with hyperglycemia, without long-term current use of insulin  -     Comprehensive metabolic panel  -     Vitamin D 25 hydroxy  -     TSH  -     Lipid panel  -     Hemoglobin A1c  -     PSA DIAGNOSTIC ONLY  -     CBC w AUTO Differential  -     Semaglutide,0.25 or 0.5MG/DOS, (Ozempic, 0.25 or 0.5 MG/DOSE,) 2 MG/3ML solution pen-injector; Inject 0.25 mg under the skin into the appropriate area as directed 1 (One) Time Per Week.  Dispense: 3 mL; Refill: 0  -     dapagliflozin Propanediol (Farxiga) 10 MG tablet; 1 po q am  Dispense: 30 tablet; Refill: 5  -     metFORMIN ER (GLUCOPHAGE-XR) 500 MG 24 hr tablet; Take 1 tablet by mouth Daily With Breakfast.  Dispense: 90 tablet; Refill: 1    6. Vitamin D deficiency  -     Comprehensive metabolic panel  -     Vitamin D 25 hydroxy  -     TSH  -     Lipid panel  -     Hemoglobin A1c  -     PSA DIAGNOSTIC ONLY  -     CBC w AUTO Differential    7. Depression with anxiety  -     Comprehensive metabolic panel  -     Vitamin D 25 hydroxy  -     TSH  -     Lipid panel  -     Hemoglobin A1c  -     PSA DIAGNOSTIC ONLY  -     CBC w AUTO Differential  -     citalopram (CeleXA) 40 MG tablet; Take 1 tablet by mouth Daily.  Dispense: 90 tablet; Refill: 1    8. Atrial fibrillation with RVR  -     apixaban (Eliquis) 5 MG tablet tablet; Take 1 tablet by mouth Every 12 (Twelve) Hours.  Dispense: 60 tablet; Refill: 5    Ozempic will be titrated on a monthly basis and anticipated follow-up will be at the end of his 1 mg dose.  He will bring his glucometer readings to the office at that time to determine further disposition.  Once  sugars are under control glimepiride will be discontinued    Return in about 3 months (around 10/26/2023) for Recheck.

## 2023-07-31 RX ORDER — METOPROLOL SUCCINATE 100 MG/1
50 TABLET, EXTENDED RELEASE ORAL 2 TIMES DAILY
Qty: 90 TABLET | Refills: 0 | Status: SHIPPED | OUTPATIENT
Start: 2023-07-31

## 2023-07-31 RX ORDER — SOTALOL HYDROCHLORIDE 120 MG/1
120 TABLET ORAL EVERY 12 HOURS
Qty: 60 TABLET | Refills: 0 | OUTPATIENT
Start: 2023-07-31

## 2023-08-04 ENCOUNTER — OFFICE VISIT (OUTPATIENT)
Dept: CARDIOLOGY | Facility: CLINIC | Age: 48
End: 2023-08-04
Payer: COMMERCIAL

## 2023-08-04 VITALS
DIASTOLIC BLOOD PRESSURE: 88 MMHG | BODY MASS INDEX: 45.48 KG/M2 | SYSTOLIC BLOOD PRESSURE: 140 MMHG | WEIGHT: 315 LBS | HEART RATE: 82 BPM

## 2023-08-04 DIAGNOSIS — I10 BENIGN ESSENTIAL HTN: ICD-10-CM

## 2023-08-04 DIAGNOSIS — I48.0 PAROXYSMAL ATRIAL FIBRILLATION: Primary | ICD-10-CM

## 2023-08-04 PROCEDURE — 93000 ELECTROCARDIOGRAM COMPLETE: CPT | Performed by: INTERNAL MEDICINE

## 2023-08-04 PROCEDURE — 99214 OFFICE O/P EST MOD 30 MIN: CPT | Performed by: INTERNAL MEDICINE

## 2023-08-07 RX ORDER — SOTALOL HYDROCHLORIDE 120 MG/1
TABLET ORAL
Qty: 180 TABLET | Refills: 1 | Status: SHIPPED | OUTPATIENT
Start: 2023-08-07 | End: 2023-08-11 | Stop reason: SDUPTHER

## 2023-08-10 RX ORDER — CYCLOBENZAPRINE HCL 10 MG
TABLET ORAL
Qty: 30 TABLET | Refills: 0 | Status: SHIPPED | OUTPATIENT
Start: 2023-08-10

## 2023-08-11 RX ORDER — TADALAFIL 5 MG/1
5 TABLET ORAL DAILY PRN
Qty: 30 TABLET | Refills: 3 | Status: SHIPPED | OUTPATIENT
Start: 2023-08-11

## 2023-08-14 RX ORDER — SOTALOL HYDROCHLORIDE 120 MG/1
120 TABLET ORAL EVERY 12 HOURS
Qty: 180 TABLET | Refills: 1 | Status: SHIPPED | OUTPATIENT
Start: 2023-08-14

## 2023-08-19 DIAGNOSIS — E11.65 TYPE 2 DIABETES MELLITUS WITH HYPERGLYCEMIA, WITHOUT LONG-TERM CURRENT USE OF INSULIN: ICD-10-CM

## 2023-08-21 RX ORDER — SEMAGLUTIDE 0.68 MG/ML
INJECTION, SOLUTION SUBCUTANEOUS
Qty: 3 ML | Refills: 0 | Status: SHIPPED | OUTPATIENT
Start: 2023-08-21

## 2023-08-31 DIAGNOSIS — E78.2 MIXED HYPERLIPIDEMIA: ICD-10-CM

## 2023-08-31 DIAGNOSIS — M15.9 PRIMARY OSTEOARTHRITIS INVOLVING MULTIPLE JOINTS: ICD-10-CM

## 2023-08-31 DIAGNOSIS — E11.65 TYPE 2 DIABETES MELLITUS WITH HYPERGLYCEMIA, WITHOUT LONG-TERM CURRENT USE OF INSULIN: ICD-10-CM

## 2023-08-31 RX ORDER — METFORMIN HYDROCHLORIDE 500 MG/1
500 TABLET, EXTENDED RELEASE ORAL
Qty: 90 TABLET | Refills: 1 | Status: SHIPPED | OUTPATIENT
Start: 2023-08-31

## 2023-08-31 RX ORDER — ATORVASTATIN CALCIUM 40 MG/1
40 TABLET, FILM COATED ORAL DAILY
Qty: 90 TABLET | Refills: 1 | Status: SHIPPED | OUTPATIENT
Start: 2023-08-31

## 2023-08-31 RX ORDER — CELECOXIB 200 MG/1
200 CAPSULE ORAL 2 TIMES DAILY
Qty: 60 CAPSULE | Refills: 5 | Status: SHIPPED | OUTPATIENT
Start: 2023-08-31

## 2023-09-26 ENCOUNTER — TELEPHONE (OUTPATIENT)
Dept: FAMILY MEDICINE CLINIC | Facility: CLINIC | Age: 48
End: 2023-09-26
Payer: COMMERCIAL

## 2023-09-26 DIAGNOSIS — E11.65 TYPE 2 DIABETES MELLITUS WITH HYPERGLYCEMIA, WITHOUT LONG-TERM CURRENT USE OF INSULIN: ICD-10-CM

## 2023-09-26 DIAGNOSIS — F41.8 DEPRESSION WITH ANXIETY: ICD-10-CM

## 2023-09-26 DIAGNOSIS — M15.9 PRIMARY OSTEOARTHRITIS INVOLVING MULTIPLE JOINTS: ICD-10-CM

## 2023-09-26 DIAGNOSIS — E78.2 MIXED HYPERLIPIDEMIA: ICD-10-CM

## 2023-09-26 DIAGNOSIS — I10 BENIGN ESSENTIAL HTN: ICD-10-CM

## 2023-09-26 RX ORDER — CELECOXIB 200 MG/1
200 CAPSULE ORAL 2 TIMES DAILY
Qty: 60 CAPSULE | Refills: 0 | OUTPATIENT
Start: 2023-09-26

## 2023-09-26 RX ORDER — CITALOPRAM 40 MG/1
40 TABLET ORAL DAILY
Qty: 90 TABLET | Refills: 0 | Status: SHIPPED | OUTPATIENT
Start: 2023-09-26

## 2023-09-26 RX ORDER — CYCLOBENZAPRINE HCL 10 MG
10 TABLET ORAL 3 TIMES DAILY PRN
Qty: 30 TABLET | Refills: 0 | Status: SHIPPED | OUTPATIENT
Start: 2023-09-26

## 2023-09-26 RX ORDER — METFORMIN HYDROCHLORIDE 500 MG/1
500 TABLET, EXTENDED RELEASE ORAL
Qty: 90 TABLET | Refills: 0 | Status: SHIPPED | OUTPATIENT
Start: 2023-09-26

## 2023-09-26 RX ORDER — LISINOPRIL 20 MG/1
20 TABLET ORAL DAILY
Qty: 90 TABLET | Refills: 0 | Status: SHIPPED | OUTPATIENT
Start: 2023-09-26

## 2023-09-26 RX ORDER — SEMAGLUTIDE 0.68 MG/ML
0.25 INJECTION, SOLUTION SUBCUTANEOUS WEEKLY
Qty: 3 ML | Refills: 0 | OUTPATIENT
Start: 2023-09-26

## 2023-09-26 RX ORDER — TADALAFIL 5 MG/1
5 TABLET ORAL DAILY PRN
Qty: 30 TABLET | Refills: 0 | Status: SHIPPED | OUTPATIENT
Start: 2023-09-26

## 2023-09-26 RX ORDER — CYCLOBENZAPRINE HCL 10 MG
TABLET ORAL
Qty: 30 TABLET | Refills: 0 | OUTPATIENT
Start: 2023-09-26

## 2023-09-26 RX ORDER — ATORVASTATIN CALCIUM 40 MG/1
40 TABLET, FILM COATED ORAL DAILY
Qty: 90 TABLET | Refills: 0 | Status: SHIPPED | OUTPATIENT
Start: 2023-09-26

## 2023-09-26 NOTE — TELEPHONE ENCOUNTER
Concerning refills, his Ozempic dose should be increased to 0.5 mg weekly as long as tolerating without adverse effects.  Please clarify.  Also there was a request for Celebrex however he is also on Eliquis.  Need to make sure no bleeding issues.

## 2023-09-26 NOTE — TELEPHONE ENCOUNTER
Attempted to call pt with message from [place] Mehta but did not get answer and could not LVM bc it was not set up. I sent patient a SiSaf message.    IF PT CALLS PLEASE RELAY MESSAGE BELOW IF NEEDED  Concerning refills, his Ozempic dose should be increased to 0.5 mg weekly as long as tolerating without adverse effects.  Please clarify.  Also there was a request for Celebrex however he is also on Eliquis.  Need to make sure no bleeding issues.

## 2023-09-27 RX ORDER — SOTALOL HYDROCHLORIDE 120 MG/1
120 TABLET ORAL EVERY 12 HOURS
Qty: 180 TABLET | Refills: 1 | Status: SHIPPED | OUTPATIENT
Start: 2023-09-27

## 2023-09-27 RX ORDER — METOPROLOL SUCCINATE 100 MG/1
50 TABLET, EXTENDED RELEASE ORAL 2 TIMES DAILY
Qty: 90 TABLET | Refills: 0 | Status: SHIPPED | OUTPATIENT
Start: 2023-09-27

## 2023-10-11 ENCOUNTER — OFFICE VISIT (OUTPATIENT)
Dept: FAMILY MEDICINE CLINIC | Facility: CLINIC | Age: 48
End: 2023-10-11
Payer: COMMERCIAL

## 2023-10-11 ENCOUNTER — HOSPITAL ENCOUNTER (OUTPATIENT)
Dept: GENERAL RADIOLOGY | Facility: HOSPITAL | Age: 48
Discharge: HOME | End: 2023-10-11
Payer: COMMERCIAL

## 2023-10-11 VITALS
HEIGHT: 70 IN | OXYGEN SATURATION: 97 % | DIASTOLIC BLOOD PRESSURE: 80 MMHG | BODY MASS INDEX: 44.38 KG/M2 | SYSTOLIC BLOOD PRESSURE: 130 MMHG | WEIGHT: 310 LBS | HEART RATE: 85 BPM | TEMPERATURE: 97.8 F

## 2023-10-11 DIAGNOSIS — L97.919: ICD-10-CM

## 2023-10-11 DIAGNOSIS — M25.561 CHRONIC PAIN OF RIGHT KNEE: ICD-10-CM
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
## 2023-10-11 DIAGNOSIS — I83.019: ICD-10-CM

## 2023-10-11 DIAGNOSIS — M25.551 PAIN OF RIGHT HIP: Primary | ICD-10-CM

## 2023-10-11 DIAGNOSIS — G89.29 CHRONIC PAIN OF RIGHT KNEE: ICD-10-CM

## 2023-10-11 PROCEDURE — 73502 X-RAY EXAM HIP UNI 2-3 VIEWS: CPT

## 2023-10-11 PROCEDURE — 73562 X-RAY EXAM OF KNEE 3: CPT

## 2023-10-12 NOTE — PROGRESS NOTES
Subjective   Santiago Zamora is a 48 y.o. male with   Chief Complaint   Patient presents with    Hip Pain     Right side    Knee Pain     Right side  Had a fall over the summer and since then has had pain and it has gotten worse   .    Hip Pain     Knee Pain        48-year-old white male who apparently tripped over a trailer hitch approximately 2 months ago falling on his right hip/knee/lateral calf with complaint of severe pain in all of these areas.  It has been extremely difficult to sleep.  His range of motion has been decreased at the right hip and knee.  He denies knowledge of soft tissue swelling and there has been no erythema.  He does state that he had significant numbers of varicosities in his right anterior shin and lateral calf region and that this area now feels very hard.  He has been anticoagulated using Eliquis with previous DVT.  He has tried to see this resolved over the last 2 months and there has been no improvement in his symptoms.  The following portions of the patient's history were reviewed and updated as appropriate: allergies, current medications, past family history, past medical history, past social history, past surgical history and problem list.    Review of Systems   Cardiovascular:         Significant right lower extremity varicosities   Musculoskeletal:  Positive for arthralgias.       Objective     Vitals:    10/11/23 1447   BP: 130/80   Pulse: 85   Temp: 97.8 øF (36.6 øC)   SpO2: 97%       No results found for this or any previous visit (from the past 672 hour(s)).    Physical Exam  Vitals and nursing note reviewed.   Constitutional:       Appearance: Normal appearance. He is well-developed and well-groomed.   HENT:      Head: Normocephalic and atraumatic.   Musculoskeletal:      Cervical back: Neck supple.      Comments: Significant lower extremity varicosities in the right anterior shin region as well right lateral proximal calf area-bony maximum tenderness is in this lateral  proximal calf region.  Right knee without evidence of effusion, erythema, ecchymosis or deformity.  Ligaments appear to be stable with negative drawer sign and negative Lachman.  Range of motion is limited secondary to pain so full exam is difficult with questionable positive flick test.  Straight leg raising is negative at 60 degrees with exquisite pain in the right hip with SARTHAK testing.   Skin:     General: Skin is warm and dry.      Findings: No rash.   Neurological:      Mental Status: He is alert and oriented to person, place, and time.      Sensory: Sensation is intact.      Motor: Motor function is intact.   Psychiatric:         Attention and Perception: Attention and perception normal.         Mood and Affect: Mood and affect normal.         Speech: Speech normal.         Behavior: Behavior normal. Behavior is cooperative.         Thought Content: Thought content normal.         Cognition and Memory: Cognition and memory normal.         Judgment: Judgment normal.         Assessment & Plan   Diagnoses and all orders for this visit:    1. Pain of right hip (Primary)  -     XR Hip With or Without Pelvis 2 - 3 View Right  -     XR Knee 3 View Right  -     US Venous Doppler Lower Extremity Right (duplex); Future    2. Chronic pain of right knee  -     XR Hip With or Without Pelvis 2 - 3 View Right  -     XR Knee 3 View Right  -     US Venous Doppler Lower Extremity Right (duplex); Future    3. Ulcer of varicose vein of right leg  -     US Venous Doppler Lower Extremity Right (duplex); Future    4. Fall, subsequent encounter  -     US Venous Doppler Lower Extremity Right (duplex); Future        Return if symptoms worsen or fail to improve.

## 2023-10-22 DIAGNOSIS — E11.65 TYPE 2 DIABETES MELLITUS WITH HYPERGLYCEMIA, WITHOUT LONG-TERM CURRENT USE OF INSULIN: ICD-10-CM

## 2023-10-22 DIAGNOSIS — I10 BENIGN ESSENTIAL HTN: ICD-10-CM

## 2023-10-22 DIAGNOSIS — F41.8 DEPRESSION WITH ANXIETY: ICD-10-CM

## 2023-10-22 DIAGNOSIS — E78.2 MIXED HYPERLIPIDEMIA: ICD-10-CM

## 2023-10-22 DIAGNOSIS — M15.9 PRIMARY OSTEOARTHRITIS INVOLVING MULTIPLE JOINTS: ICD-10-CM

## 2023-10-23 RX ORDER — SEMAGLUTIDE 0.68 MG/ML
0.25 INJECTION, SOLUTION SUBCUTANEOUS WEEKLY
Qty: 3 ML | Refills: 0 | Status: SHIPPED | OUTPATIENT
Start: 2023-10-23

## 2023-10-23 RX ORDER — CELECOXIB 200 MG/1
200 CAPSULE ORAL 2 TIMES DAILY
Qty: 60 CAPSULE | Refills: 5 | Status: SHIPPED | OUTPATIENT
Start: 2023-10-23

## 2023-10-23 RX ORDER — CITALOPRAM 40 MG/1
40 TABLET ORAL DAILY
Qty: 90 TABLET | Refills: 0 | Status: SHIPPED | OUTPATIENT
Start: 2023-10-23

## 2023-10-23 RX ORDER — SOTALOL HYDROCHLORIDE 120 MG/1
120 TABLET ORAL EVERY 12 HOURS
Qty: 180 TABLET | Refills: 1 | Status: SHIPPED | OUTPATIENT
Start: 2023-10-23

## 2023-10-23 RX ORDER — METFORMIN HYDROCHLORIDE 500 MG/1
500 TABLET, EXTENDED RELEASE ORAL
Qty: 90 TABLET | Refills: 0 | Status: SHIPPED | OUTPATIENT
Start: 2023-10-23

## 2023-10-23 RX ORDER — TADALAFIL 5 MG/1
5 TABLET ORAL DAILY PRN
Qty: 30 TABLET | Refills: 0 | Status: SHIPPED | OUTPATIENT
Start: 2023-10-23

## 2023-10-23 RX ORDER — LISINOPRIL 20 MG/1
20 TABLET ORAL DAILY
Qty: 90 TABLET | Refills: 0 | Status: SHIPPED | OUTPATIENT
Start: 2023-10-23

## 2023-10-23 RX ORDER — ATORVASTATIN CALCIUM 40 MG/1
40 TABLET, FILM COATED ORAL DAILY
Qty: 90 TABLET | Refills: 0 | Status: SHIPPED | OUTPATIENT
Start: 2023-10-23

## 2023-10-23 RX ORDER — CYCLOBENZAPRINE HCL 10 MG
10 TABLET ORAL 3 TIMES DAILY PRN
Qty: 30 TABLET | Refills: 0 | Status: SHIPPED | OUTPATIENT
Start: 2023-10-23

## 2023-12-18 DIAGNOSIS — I10 BENIGN ESSENTIAL HTN: ICD-10-CM

## 2023-12-18 DIAGNOSIS — E11.65 TYPE 2 DIABETES MELLITUS WITH HYPERGLYCEMIA, WITHOUT LONG-TERM CURRENT USE OF INSULIN: ICD-10-CM

## 2023-12-18 DIAGNOSIS — F41.8 DEPRESSION WITH ANXIETY: ICD-10-CM

## 2023-12-18 DIAGNOSIS — E78.2 MIXED HYPERLIPIDEMIA: ICD-10-CM

## 2023-12-19 RX ORDER — METFORMIN HYDROCHLORIDE 500 MG/1
500 TABLET, EXTENDED RELEASE ORAL
Qty: 90 TABLET | Refills: 0 | Status: SHIPPED | OUTPATIENT
Start: 2023-12-19

## 2023-12-19 RX ORDER — SOTALOL HYDROCHLORIDE 120 MG/1
120 TABLET ORAL EVERY 12 HOURS
Qty: 180 TABLET | Refills: 0 | Status: SHIPPED | OUTPATIENT
Start: 2023-12-19

## 2023-12-19 RX ORDER — CITALOPRAM 40 MG/1
40 TABLET ORAL DAILY
Qty: 90 TABLET | Refills: 0 | Status: SHIPPED | OUTPATIENT
Start: 2023-12-19

## 2023-12-19 RX ORDER — TADALAFIL 5 MG/1
5 TABLET ORAL DAILY PRN
Qty: 30 TABLET | Refills: 0 | Status: SHIPPED | OUTPATIENT
Start: 2023-12-19

## 2023-12-19 RX ORDER — LISINOPRIL 20 MG/1
20 TABLET ORAL DAILY
Qty: 90 TABLET | Refills: 0 | Status: SHIPPED | OUTPATIENT
Start: 2023-12-19

## 2023-12-19 RX ORDER — CYCLOBENZAPRINE HCL 10 MG
10 TABLET ORAL 3 TIMES DAILY PRN
Qty: 30 TABLET | Refills: 0 | Status: SHIPPED | OUTPATIENT
Start: 2023-12-19

## 2023-12-19 RX ORDER — SEMAGLUTIDE 0.68 MG/ML
0.25 INJECTION, SOLUTION SUBCUTANEOUS WEEKLY
Qty: 3 ML | Refills: 0 | Status: SHIPPED | OUTPATIENT
Start: 2023-12-19

## 2023-12-19 RX ORDER — ATORVASTATIN CALCIUM 40 MG/1
40 TABLET, FILM COATED ORAL DAILY
Qty: 90 TABLET | Refills: 0 | Status: SHIPPED | OUTPATIENT
Start: 2023-12-19

## 2024-02-28 DIAGNOSIS — I48.91 ATRIAL FIBRILLATION WITH RVR: ICD-10-CM

## 2024-03-01 RX ORDER — SOTALOL HYDROCHLORIDE 120 MG/1
120 TABLET ORAL EVERY 12 HOURS
Qty: 180 TABLET | Refills: 0 | Status: SHIPPED | OUTPATIENT
Start: 2024-03-01

## 2024-03-02 DIAGNOSIS — E11.65 TYPE 2 DIABETES MELLITUS WITH HYPERGLYCEMIA, WITHOUT LONG-TERM CURRENT USE OF INSULIN: ICD-10-CM

## 2024-03-02 DIAGNOSIS — F41.8 DEPRESSION WITH ANXIETY: ICD-10-CM

## 2024-03-04 RX ORDER — CITALOPRAM 40 MG/1
40 TABLET ORAL DAILY
Qty: 90 TABLET | Refills: 0 | OUTPATIENT
Start: 2024-03-04

## 2024-03-04 RX ORDER — METFORMIN HYDROCHLORIDE 500 MG/1
500 TABLET, EXTENDED RELEASE ORAL
Qty: 90 TABLET | Refills: 0 | OUTPATIENT
Start: 2024-03-04

## 2024-03-27 DIAGNOSIS — I48.91 ATRIAL FIBRILLATION WITH RVR: ICD-10-CM

## 2024-03-27 RX ORDER — APIXABAN 5 MG/1
5 TABLET, FILM COATED ORAL EVERY 12 HOURS
Qty: 60 TABLET | Refills: 0 | Status: SHIPPED | OUTPATIENT
Start: 2024-03-27 | End: 2024-04-01 | Stop reason: SDUPTHER

## 2024-04-01 ENCOUNTER — TELEMEDICINE (OUTPATIENT)
Dept: FAMILY MEDICINE CLINIC | Facility: CLINIC | Age: 49
End: 2024-04-01
Payer: COMMERCIAL

## 2024-04-01 DIAGNOSIS — E11.65 TYPE 2 DIABETES MELLITUS WITH HYPERGLYCEMIA, WITHOUT LONG-TERM CURRENT USE OF INSULIN: Primary | ICD-10-CM

## 2024-04-01 DIAGNOSIS — F41.8 DEPRESSION WITH ANXIETY: ICD-10-CM

## 2024-04-01 DIAGNOSIS — I48.0 PAROXYSMAL ATRIAL FIBRILLATION: ICD-10-CM

## 2024-04-01 DIAGNOSIS — E66.9 OBESITY (BMI 30-39.9): ICD-10-CM

## 2024-04-01 DIAGNOSIS — E55.9 VITAMIN D DEFICIENCY: ICD-10-CM

## 2024-04-01 DIAGNOSIS — N52.8 OTHER MALE ERECTILE DYSFUNCTION: ICD-10-CM

## 2024-04-01 DIAGNOSIS — E11.65 UNCONTROLLED TYPE 2 DIABETES MELLITUS WITH HYPERGLYCEMIA: ICD-10-CM

## 2024-04-01 DIAGNOSIS — E78.2 MIXED HYPERLIPIDEMIA: ICD-10-CM

## 2024-04-01 DIAGNOSIS — I10 BENIGN ESSENTIAL HTN: ICD-10-CM

## 2024-04-01 DIAGNOSIS — I48.91 ATRIAL FIBRILLATION WITH RVR: ICD-10-CM

## 2024-04-01 RX ORDER — DAPAGLIFLOZIN 10 MG/1
TABLET, FILM COATED ORAL
Qty: 30 TABLET | Refills: 5 | Status: SHIPPED | OUTPATIENT
Start: 2024-04-01

## 2024-04-01 RX ORDER — TADALAFIL 5 MG/1
5 TABLET ORAL DAILY PRN
Qty: 30 TABLET | Refills: 5 | Status: SHIPPED | OUTPATIENT
Start: 2024-04-01

## 2024-04-01 RX ORDER — CITALOPRAM 40 MG/1
40 TABLET ORAL DAILY
Qty: 90 TABLET | Refills: 1 | Status: SHIPPED | OUTPATIENT
Start: 2024-04-01

## 2024-04-01 RX ORDER — ATORVASTATIN CALCIUM 40 MG/1
40 TABLET, FILM COATED ORAL DAILY
Qty: 90 TABLET | Refills: 1 | Status: SHIPPED | OUTPATIENT
Start: 2024-04-01

## 2024-04-01 RX ORDER — METFORMIN HYDROCHLORIDE 500 MG/1
500 TABLET, EXTENDED RELEASE ORAL
Qty: 90 TABLET | Refills: 1 | Status: SHIPPED | OUTPATIENT
Start: 2024-04-01

## 2024-04-01 RX ORDER — GLIMEPIRIDE 4 MG/1
TABLET ORAL
Qty: 90 TABLET | Refills: 1 | Status: SHIPPED | OUTPATIENT
Start: 2024-04-01

## 2024-04-01 RX ORDER — LISINOPRIL 20 MG/1
20 TABLET ORAL DAILY
Qty: 90 TABLET | Refills: 1 | Status: SHIPPED | OUTPATIENT
Start: 2024-04-01

## 2024-04-01 NOTE — PROGRESS NOTES
Subjective   Santiago Zamora is a 49 y.o. male with No chief complaint on file.  .    History of Present Illness   49-year-old white male here with consented video visit while he is sitting in his car outside of his home and myself in the office with multiple medical issues.  Patient with known history of type II non-insulin-dependent diabetes mellitus using metformin ER as well as Ozempic.  He is also using lisinopril and metoprolol for hypertension and has history of hyperlipidemia.  There is also history of morbid exogenous obesity.  Last fasting labs were in July 2023 and there have been no recent labs.  Patient has not been very compulsive and following his sugars by glucometer and will need to have lab work in the near future to determine his status.  He is unsure as to how much Ozempic is using-states that he uses 6 clicks a week.  He is otherwise doing well-he feels well and is working full-time.  He is not traveling as much as he had at 1 time leaving town every few weeks.  The following portions of the patient's history were reviewed and updated as appropriate: allergies, current medications, past family history, past medical history, past social history, past surgical history and problem list.    Review of Systems   Cardiovascular:         Hypertension, hyperlipidemia   Endocrine:        Exogenous obesity, vitamin D deficiency, type II non-insulin-dependent diabetes mellitus       Objective     There were no vitals filed for this visit.    No results found for this or any previous visit (from the past 672 hour(s)).    Physical Exam  Vitals and nursing note reviewed.   Constitutional:       Appearance: Normal appearance. He is well-developed and well-groomed.   HENT:      Head: Normocephalic and atraumatic.   Musculoskeletal:      Cervical back: Neck supple.   Skin:     General: Skin is warm and dry.      Findings: No rash.   Neurological:      Mental Status: He is alert and oriented to person, place, and  time.   Psychiatric:         Attention and Perception: Attention and perception normal.         Mood and Affect: Mood and affect normal.         Speech: Speech normal.         Behavior: Behavior normal. Behavior is cooperative.         Thought Content: Thought content normal.         Cognition and Memory: Cognition and memory normal.         Judgment: Judgment normal.         Assessment & Plan   Diagnoses and all orders for this visit:    1. Type 2 diabetes mellitus with hyperglycemia, without long-term current use of insulin (Primary)  -     Comprehensive metabolic panel; Future  -     PSA DIAGNOSTIC ONLY; Future  -     Vitamin D 25 hydroxy; Future  -     TSH; Future  -     Lipid panel; Future  -     Hemoglobin A1c; Future  -     CBC w AUTO Differential; Future  -     dapagliflozin Propanediol (Farxiga) 10 MG tablet; 1 po q am  Dispense: 30 tablet; Refill: 5  -     metFORMIN ER (GLUCOPHAGE-XR) 500 MG 24 hr tablet; Take 1 tablet by mouth Daily With Breakfast.  Dispense: 90 tablet; Refill: 1    2. Obesity (BMI 30-39.9)  -     Comprehensive metabolic panel; Future  -     PSA DIAGNOSTIC ONLY; Future  -     Vitamin D 25 hydroxy; Future  -     TSH; Future  -     Lipid panel; Future  -     Hemoglobin A1c; Future  -     CBC w AUTO Differential; Future    3. Vitamin D deficiency  -     Comprehensive metabolic panel; Future  -     PSA DIAGNOSTIC ONLY; Future  -     Vitamin D 25 hydroxy; Future  -     TSH; Future  -     Lipid panel; Future  -     Hemoglobin A1c; Future  -     CBC w AUTO Differential; Future    4. Benign essential HTN  -     Comprehensive metabolic panel; Future  -     PSA DIAGNOSTIC ONLY; Future  -     Vitamin D 25 hydroxy; Future  -     TSH; Future  -     Lipid panel; Future  -     Hemoglobin A1c; Future  -     CBC w AUTO Differential; Future  -     lisinopril (PRINIVIL,ZESTRIL) 20 MG tablet; Take 1 tablet by mouth Daily.  Dispense: 90 tablet; Refill: 1    5. Mixed hyperlipidemia  -     Comprehensive  metabolic panel; Future  -     PSA DIAGNOSTIC ONLY; Future  -     Vitamin D 25 hydroxy; Future  -     TSH; Future  -     Lipid panel; Future  -     Hemoglobin A1c; Future  -     CBC w AUTO Differential; Future  -     atorvastatin (LIPITOR) 40 MG tablet; Take 1 tablet by mouth Daily.  Dispense: 90 tablet; Refill: 1    6. Paroxysmal atrial fibrillation  -     Comprehensive metabolic panel; Future  -     PSA DIAGNOSTIC ONLY; Future  -     Vitamin D 25 hydroxy; Future  -     TSH; Future  -     Lipid panel; Future  -     Hemoglobin A1c; Future  -     CBC w AUTO Differential; Future    7. Other male erectile dysfunction  -     Comprehensive metabolic panel; Future  -     PSA DIAGNOSTIC ONLY; Future  -     Vitamin D 25 hydroxy; Future  -     TSH; Future  -     Lipid panel; Future  -     Hemoglobin A1c; Future  -     CBC w AUTO Differential; Future  -     tadalafil (CIALIS) 5 MG tablet; Take 1 tablet by mouth Daily As Needed for Erectile Dysfunction.  Dispense: 30 tablet; Refill: 5    8. Depression with anxiety  -     Comprehensive metabolic panel; Future  -     PSA DIAGNOSTIC ONLY; Future  -     Vitamin D 25 hydroxy; Future  -     TSH; Future  -     Lipid panel; Future  -     Hemoglobin A1c; Future  -     CBC w AUTO Differential; Future  -     citalopram (CeleXA) 40 MG tablet; Take 1 tablet by mouth Daily.  Dispense: 90 tablet; Refill: 1    9. Uncontrolled type 2 diabetes mellitus with hyperglycemia  -     glimepiride (AMARYL) 4 MG tablet; Take 1 tablet each morning before breakfast.  Dispense: 90 tablet; Refill: 1    10. Atrial fibrillation with RVR  -     apixaban (Eliquis) 5 MG tablet tablet; Take 1 tablet by mouth Every 12 (Twelve) Hours.  Dispense: 60 tablet; Refill: 5    Patient will return somewhere in the next 4 to 8 weeks for fasting labs and then follow-up with me in the office 1 week thereafter.  All medications have been refilled with the exclusion of Ozempic-patient will have to identify exactly what milligram  dose he is using.  20 minutes was spent completing this consented video visits.    Return in about 6 weeks (around 5/13/2024) for Recheck.

## 2024-04-07 ENCOUNTER — HOSPITAL ENCOUNTER (EMERGENCY)
Facility: HOSPITAL | Age: 49
Discharge: HOME | End: 2024-04-07
Attending: EMERGENCY MEDICINE | Admitting: EMERGENCY MEDICINE
Payer: COMMERCIAL

## 2024-04-07 VITALS
WEIGHT: 298 LBS | BODY MASS INDEX: 40.36 KG/M2 | DIASTOLIC BLOOD PRESSURE: 95 MMHG | OXYGEN SATURATION: 97 % | RESPIRATION RATE: 18 BRPM | HEIGHT: 72 IN | HEART RATE: 100 BPM | TEMPERATURE: 98 F | SYSTOLIC BLOOD PRESSURE: 134 MMHG

## 2024-04-07 DIAGNOSIS — N49.2 ABSCESS OF SCROTAL WALL: Primary | ICD-10-CM

## 2024-04-07 LAB
BACTERIA UR QL AUTO: NORMAL /HPF
BILIRUB UR QL STRIP: NEGATIVE
CLARITY UR: CLEAR
COLOR UR: YELLOW
GLUCOSE UR STRIP-MCNC: ABNORMAL MG/DL
HGB UR QL STRIP.AUTO: ABNORMAL
HYALINE CASTS UR QL AUTO: NORMAL /LPF
KETONES UR QL STRIP: NEGATIVE
LEUKOCYTE ESTERASE UR QL STRIP.AUTO: NEGATIVE
NITRITE UR QL STRIP: NEGATIVE
PH UR STRIP.AUTO: 5.5 [PH] (ref 4.5–8)
PROT UR QL STRIP: NEGATIVE
RBC # UR STRIP: NORMAL /HPF
REF LAB TEST METHOD: NORMAL
SP GR UR STRIP: 1.01 (ref 1–1.03)
SQUAMOUS #/AREA URNS HPF: NORMAL /HPF
UROBILINOGEN UR QL STRIP: ABNORMAL
WBC # UR STRIP: NORMAL /HPF

## 2024-04-07 PROCEDURE — 81001 URINALYSIS AUTO W/SCOPE: CPT | Performed by: EMERGENCY MEDICINE

## 2024-04-07 PROCEDURE — 99283 EMERGENCY DEPT VISIT LOW MDM: CPT

## 2024-04-07 PROCEDURE — 87205 SMEAR GRAM STAIN: CPT | Performed by: EMERGENCY MEDICINE

## 2024-04-07 PROCEDURE — 87070 CULTURE OTHR SPECIMN AEROBIC: CPT | Performed by: EMERGENCY MEDICINE

## 2024-04-07 PROCEDURE — 87147 CULTURE TYPE IMMUNOLOGIC: CPT | Performed by: EMERGENCY MEDICINE

## 2024-04-07 RX ORDER — CEPHALEXIN 500 MG/1
500 CAPSULE ORAL ONCE
Status: COMPLETED | OUTPATIENT
Start: 2024-04-07 | End: 2024-04-07

## 2024-04-07 RX ORDER — DOXYCYCLINE 100 MG/1
100 CAPSULE ORAL ONCE
Status: COMPLETED | OUTPATIENT
Start: 2024-04-07 | End: 2024-04-07

## 2024-04-07 RX ORDER — DOXYCYCLINE 100 MG/1
100 CAPSULE ORAL 2 TIMES DAILY
Qty: 20 CAPSULE | Refills: 0 | Status: SHIPPED | OUTPATIENT
Start: 2024-04-07 | End: 2024-04-17

## 2024-04-07 RX ORDER — CEPHALEXIN 500 MG/1
500 CAPSULE ORAL 4 TIMES DAILY
Qty: 40 CAPSULE | Refills: 0 | Status: SHIPPED | OUTPATIENT
Start: 2024-04-07 | End: 2024-04-17

## 2024-04-07 RX ADMIN — CEPHALEXIN 500 MG: 500 CAPSULE ORAL at 15:13

## 2024-04-07 RX ADMIN — DOXYCYCLINE 100 MG: 100 CAPSULE ORAL at 15:12

## 2024-04-07 NOTE — DISCHARGE INSTRUCTIONS
Follow-up with [facility] tomorrow.  Return immediately to the emergency department should you become worse in any way.

## 2024-04-07 NOTE — Clinical Note
ISAIAS TUCKER  [facility] EMERGENCY DEPARTMENT  [address] [place] [address]  Phone: [phone]    Santiago Zamora was seen and treated in our emergency department on 4/7/2024.  He may return to work on 04/10/2024.         Thank you for choosing [facility].    Dav Delacruz MD

## 2024-04-07 NOTE — ED PROVIDER NOTES
"Subjective     History provided by:  Patient    History of Present Illness    Chief complaint: Not on my scrotum    Location: Posterior midline scrotum.    Quality/Severity: Swollen and painful knot.    Timing/Onset: Patient noticed it at 3 PM yesterday.    Modifying Factors: The patient is a type II diabetic.    Associated symptoms: Denies pain in either testicles.  No urethral discharge or dysuria.  No fever.    Narrative: The patient is a 49-year-old white male with a swelling and discomfort on the posterior backside of the scrotum.  The testicles are noninvolved.  Patient states that they were able to squeeze some discharge from it.  Denies a history of similar problem.    Review of Systems  Past Medical History:   Diagnosis Date    Anxiety     Cardiomyopathy     Depression     Diabetes mellitus     Elevated cholesterol     Hyperlipidemia     Hypertension     Varicose veins      /90 (BP Location: Right arm, Patient Position: Lying)   Pulse 100   Temp 98 °F (36.7 °C)   Resp 18   Ht 182.9 cm (72\")   Wt 135 kg (298 lb)   SpO2 97%   BMI 40.42 kg/m²     Past Medical History:   Diagnosis Date    Anxiety     Cardiomyopathy     Depression     Diabetes mellitus     Elevated cholesterol     Hyperlipidemia     Hypertension     Varicose veins      The encounter diagnosis was Abscess of scrotal wall.  No orders to display     Final diagnoses:   Abscess of scrotal wall       Allergies   Allergen Reactions    Egg-Derived Products Swelling    Fish-Derived Products Swelling       Past Surgical History:   Procedure Laterality Date    CARDIAC CATHETERIZATION      CARDIAC CATHETERIZATION N/A 5/2/2022    Procedure: Coronary angiography;  Surgeon: Stanley Ramirez MD;  Location: [facility] CATH INVASIVE LOCATION;  Service: Cardiovascular;  Laterality: N/A;    CARDIAC CATHETERIZATION N/A 5/2/2022    Procedure: Left Heart Cath;  Surgeon: Stanley Ramirez MD;  Location: [facility] CATH INVASIVE LOCATION;  Service: Cardiovascular;  " Laterality: N/A;    CARDIAC CATHETERIZATION N/A 5/2/2022    Procedure: Left ventriculography;  Surgeon: Stanley Ramirez MD;  [facility];  Service: Cardiovascular;  Laterality: N/A;    CHOLECYSTECTOMY         Family History   Problem Relation Age of Onset    Hypertension Mother     Hypertension Father     Atrial fibrillation Father     Heart attack Father     Diabetes Other        Social History     Socioeconomic History    Marital status:    Tobacco Use    Smoking status: Never    Smokeless tobacco: Never    Tobacco comments:     caffeine use- denies   Vaping Use    Vaping status: Never Used   Substance and Sexual Activity    Alcohol use: No    Drug use: No    Sexual activity: Defer           Objective   Physical Exam  Vitals and nursing note reviewed.   Constitutional:       General: He is not in acute distress.     Appearance: Normal appearance. He is obese. He is not ill-appearing, toxic-appearing or diaphoretic.      Comments: Patient appears healthy in no acute distress.  Review of his vital signs: He is afebrile with a temperature of 98, blood pressure elevated 156/90, slightly tachycardic with heart rate of 100, oxygen saturation 100% with respirations 18.   Eyes:      General: No scleral icterus.     Conjunctiva/sclera: Conjunctivae normal.   Genitourinary:     Comments: No urethral discharge or lesions on the penis.  Both testicles are descended and nontender nonswollen.  There is a swollen tender area on the posterior midline scrotum at the perinium that appears to be a subcutaneous scrotal abscess.  Skin:     General: Skin is warm and dry.      Capillary Refill: Capillary refill takes less than 2 seconds.   Neurological:      General: No focal deficit present.      Mental Status: He is alert and oriented to person, place, and time.      Cranial Nerves: No cranial nerve deficit.      Sensory: No sensory deficit.      Motor: No weakness.   Psychiatric:         Mood and  "Affect: Mood normal.         Behavior: Behavior normal.         Thought Content: Thought content normal.         Judgment: Judgment normal.         Incision & Drainage    Date/Time: 4/7/2024 2:35 PM    Performed by: Dav Delacruz MD  Authorized by: Dav Delacruz MD    Consent:     Consent obtained:  Verbal    Risks discussed:  Bleeding, damage to other organs, infection, incomplete drainage and pain  Location:     Location:  Anogenital    Anogenital location:  Scrotal wall  Pre-procedure details:     Skin preparation:  Chlorhexidine  Anesthesia:     Anesthesia method:  Local infiltration    Local anesthetic:  Lidocaine 1% WITH epi  Procedure type:     Complexity:  Simple  Procedure details:     Incision types:  Single straight    Incision depth:  Subcutaneous    Wound management:  Probed and deloculated    Drainage:  Bloody and purulent    Drainage amount:  Moderate    Wound treatment:  Wound left open    Packing materials:  1/4 in iodoform gauze    Amount 1/4\" iodoform:  3  Post-procedure details:     Procedure completion:  Tolerated             ED Course  ED Course as of 04/07/24 1502   Sun Apr 07, 2024   1455 13:55 patient discussed with Dr. Epi Comer, urology on-call, who is okay with me I&D in the scrotal abscess.  He took the patient's name and number and stated his office would contact the patient to arrange for him to be seen in clinic tomorrow. [TP]   0493 I I&D the patient's scrotal abscess.  Antibiotic therapy was initiated with Keflex and doxycycline.  He will be discharged with prescriptions for the same. [TP]      ED Course User Index  [TP] Dav Delacruz MD                                             Medical Decision Making  Problems Addressed:  Abscess of scrotal wall: complicated acute illness or injury    Amount and/or Complexity of Data Reviewed  Labs: ordered.  Discussion of management or test interpretation with external provider(s): Details documented in the ED " course.    Risk  Prescription drug management.        Final diagnoses:   Abscess of scrotal wall       ED Disposition  ED Disposition       ED Disposition   Discharge    Condition   Stable    Comment   --               Ramón Comer MD  [address] [address]  [phone]    Schedule an appointment as soon as possible for a visit in 1 day           Medication List        New Prescriptions      cephalexin 500 MG capsule  Commonly known as: KEFLEX  Take 1 capsule by mouth 4 (Four) Times a Day for 10 days.     doxycycline 100 MG capsule  Commonly known as: MONODOX  Take 1 capsule by mouth 2 (Two) Times a Day for 10 days.               Where to Get Your Medications        These medications were sent to [facility] [address] [place] [address] [facility] [phone] [facility] [phone]   [address] [facility] [address]      Phone: [phone]   cephalexin 500 MG capsule  doxycycline 100 MG capsule           Labs Reviewed   URINALYSIS W/ MICROSCOPIC IF INDICATED (NO CULTURE) - Abnormal; Notable for the following components:       Result Value    Glucose,  mg/dL (2+) (*)     Blood, UA Trace (*)     All other components within normal limits   WOUND CULTURE   URINALYSIS, MICROSCOPIC ONLY     No orders to display          Medication List        New Prescriptions      cephalexin 500 MG capsule  Commonly known as: KEFLEX  Take 1 capsule by mouth 4 (Four) Times a Day for 10 days.     doxycycline 100 MG capsule  Commonly known as: MONODOX  Take 1 capsule by mouth 2 (Two) Times a Day for 10 days.               Where to Get Your Medications        These medications were sent to [address] [address] [facility] [phone] [facility] [phone] FX  [address] [facility] [address]      Phone: [phone]   cephalexin 500 MG capsule  doxycycline 100 MG capsule              Dav Delacruz MD  04/07/24 0484

## 2024-04-08 ENCOUNTER — TELEPHONE (OUTPATIENT)
Dept: FAMILY MEDICINE CLINIC | Facility: CLINIC | Age: 49
End: 2024-04-08
Payer: COMMERCIAL

## 2024-04-08 DIAGNOSIS — M15.9 PRIMARY OSTEOARTHRITIS INVOLVING MULTIPLE JOINTS: ICD-10-CM

## 2024-04-09 RX ORDER — CELECOXIB 200 MG/1
200 CAPSULE ORAL 2 TIMES DAILY
Qty: 60 CAPSULE | Refills: 5 | Status: SHIPPED | OUTPATIENT
Start: 2024-04-09

## 2024-04-10 LAB
BACTERIA SPEC AEROBE CULT: ABNORMAL
GRAM STN SPEC: ABNORMAL
GRAM STN SPEC: ABNORMAL

## 2024-09-17 DIAGNOSIS — E11.65 UNCONTROLLED TYPE 2 DIABETES MELLITUS WITH HYPERGLYCEMIA: ICD-10-CM

## 2024-09-17 DIAGNOSIS — N52.8 OTHER MALE ERECTILE DYSFUNCTION: ICD-10-CM

## 2024-09-17 RX ORDER — TADALAFIL 5 MG/1
5 TABLET ORAL DAILY PRN
Qty: 30 TABLET | Refills: 5 | Status: SHIPPED | OUTPATIENT
Start: 2024-09-17

## 2024-09-17 RX ORDER — GLIMEPIRIDE 4 MG/1
TABLET ORAL
Qty: 90 TABLET | Refills: 0 | Status: SHIPPED | OUTPATIENT
Start: 2024-09-17

## 2024-11-01 DIAGNOSIS — I48.91 ATRIAL FIBRILLATION WITH RVR: ICD-10-CM

## 2024-11-01 DIAGNOSIS — M15.0 PRIMARY OSTEOARTHRITIS INVOLVING MULTIPLE JOINTS: ICD-10-CM

## 2024-11-02 RX ORDER — APIXABAN 5 MG/1
5 TABLET, FILM COATED ORAL EVERY 12 HOURS
Qty: 60 TABLET | Refills: 0 | Status: SHIPPED | OUTPATIENT
Start: 2024-11-02

## 2024-11-02 RX ORDER — CELECOXIB 200 MG/1
200 CAPSULE ORAL 2 TIMES DAILY
Qty: 60 CAPSULE | Refills: 0 | Status: SHIPPED | OUTPATIENT
Start: 2024-11-02

## 2024-11-13 RX ORDER — SOTALOL HYDROCHLORIDE 120 MG/1
120 TABLET ORAL EVERY 12 HOURS
Qty: 180 TABLET | Refills: 0 | OUTPATIENT
Start: 2024-11-13

## 2024-11-13 RX ORDER — SOTALOL HYDROCHLORIDE 120 MG/1
120 TABLET ORAL EVERY 12 HOURS SCHEDULED
Qty: 60 TABLET | Refills: 0 | Status: SHIPPED | OUTPATIENT
Start: 2024-11-13

## 2024-11-13 NOTE — TELEPHONE ENCOUNTER
Caller: Santiago Zamora    Relationship: Self         Requested Prescriptions:   Requested Prescriptions     Pending Prescriptions Disp Refills    sotalol (BETAPACE) 120 MG tablet [Pharmacy Med Name: SOTALOL HCL 120MG   TAB] 180 tablet 0     Sig: TAKE 1 TABLET BY MOUTH EVERY 12 HOURS        Pharmacy where request should be sent: [facility] [place] [facility] [place] [facility] [address] [facility] [phone] [facility] [phone] FX     Last office visit with prescribing clinician: Visit date not found   Last telemedicine visit with prescribing clinician: Visit date not found   Next office visit with prescribing clinician: 11/12/2024     Additional details provided by patient:      Does the patient have less than a 3 day supply:  [x] Yes  [] No    Would you like a call back once the refill request has been completed: [] Yes [] No    If the office needs to give you a call back, can they leave a voicemail: [] Yes [] No    Cristhian Guerrero Rep   11/13/24 08:10 EST

## 2024-11-18 DIAGNOSIS — F41.8 DEPRESSION WITH ANXIETY: ICD-10-CM

## 2024-11-18 DIAGNOSIS — E11.65 TYPE 2 DIABETES MELLITUS WITH HYPERGLYCEMIA, WITHOUT LONG-TERM CURRENT USE OF INSULIN: ICD-10-CM

## 2024-11-18 RX ORDER — METFORMIN HYDROCHLORIDE 500 MG/1
500 TABLET, EXTENDED RELEASE ORAL
Qty: 30 TABLET | Refills: 0 | Status: SHIPPED | OUTPATIENT
Start: 2024-11-18

## 2024-11-18 RX ORDER — CITALOPRAM HYDROBROMIDE 40 MG/1
40 TABLET ORAL DAILY
Qty: 30 TABLET | Refills: 0 | Status: SHIPPED | OUTPATIENT
Start: 2024-11-18

## 2024-11-24 DIAGNOSIS — E78.2 MIXED HYPERLIPIDEMIA: ICD-10-CM

## 2024-11-24 DIAGNOSIS — F41.8 DEPRESSION WITH ANXIETY: ICD-10-CM

## 2024-11-24 DIAGNOSIS — E11.65 TYPE 2 DIABETES MELLITUS WITH HYPERGLYCEMIA, WITHOUT LONG-TERM CURRENT USE OF INSULIN: ICD-10-CM

## 2024-11-25 RX ORDER — ATORVASTATIN CALCIUM 40 MG/1
40 TABLET, FILM COATED ORAL DAILY
Qty: 90 TABLET | Refills: 0 | Status: SHIPPED | OUTPATIENT
Start: 2024-11-25

## 2024-11-25 RX ORDER — METFORMIN HYDROCHLORIDE 500 MG/1
500 TABLET, EXTENDED RELEASE ORAL
Qty: 90 TABLET | Refills: 0 | OUTPATIENT
Start: 2024-11-25

## 2024-11-25 RX ORDER — CITALOPRAM HYDROBROMIDE 40 MG/1
40 TABLET ORAL DAILY
Qty: 90 TABLET | Refills: 0 | OUTPATIENT
Start: 2024-11-25

## 2024-11-30 DIAGNOSIS — F41.8 DEPRESSION WITH ANXIETY: ICD-10-CM

## 2024-11-30 DIAGNOSIS — M15.0 PRIMARY OSTEOARTHRITIS INVOLVING MULTIPLE JOINTS: ICD-10-CM

## 2024-11-30 DIAGNOSIS — I48.91 ATRIAL FIBRILLATION WITH RVR: ICD-10-CM

## 2024-11-30 DIAGNOSIS — E11.65 UNCONTROLLED TYPE 2 DIABETES MELLITUS WITH HYPERGLYCEMIA: ICD-10-CM

## 2024-11-30 DIAGNOSIS — E11.65 TYPE 2 DIABETES MELLITUS WITH HYPERGLYCEMIA, WITHOUT LONG-TERM CURRENT USE OF INSULIN: ICD-10-CM

## 2024-11-30 DIAGNOSIS — I10 BENIGN ESSENTIAL HTN: ICD-10-CM

## 2024-12-02 RX ORDER — SOTALOL HYDROCHLORIDE 120 MG/1
120 TABLET ORAL EVERY 12 HOURS SCHEDULED
Qty: 28 TABLET | Refills: 0 | Status: SHIPPED | OUTPATIENT
Start: 2024-12-02

## 2024-12-02 RX ORDER — CELECOXIB 200 MG/1
200 CAPSULE ORAL 2 TIMES DAILY
Qty: 60 CAPSULE | Refills: 0 | OUTPATIENT
Start: 2024-12-02

## 2024-12-02 RX ORDER — LISINOPRIL 20 MG/1
20 TABLET ORAL DAILY
Qty: 30 TABLET | Refills: 0 | Status: SHIPPED | OUTPATIENT
Start: 2024-12-02 | End: 2024-12-05 | Stop reason: SDUPTHER

## 2024-12-02 RX ORDER — CELECOXIB 200 MG/1
200 CAPSULE ORAL 2 TIMES DAILY
Qty: 60 CAPSULE | Refills: 0 | Status: SHIPPED | OUTPATIENT
Start: 2024-12-02 | End: 2024-12-05 | Stop reason: SDUPTHER

## 2024-12-02 RX ORDER — APIXABAN 5 MG/1
5 TABLET, FILM COATED ORAL EVERY 12 HOURS
Qty: 60 TABLET | Refills: 0 | OUTPATIENT
Start: 2024-12-02

## 2024-12-02 RX ORDER — METFORMIN HYDROCHLORIDE 500 MG/1
500 TABLET, EXTENDED RELEASE ORAL
Qty: 30 TABLET | Refills: 0 | Status: SHIPPED | OUTPATIENT
Start: 2024-12-02 | End: 2024-12-05 | Stop reason: SDUPTHER

## 2024-12-02 RX ORDER — CYCLOBENZAPRINE HCL 10 MG
10 TABLET ORAL 3 TIMES DAILY PRN
Qty: 30 TABLET | Refills: 0 | Status: SHIPPED | OUTPATIENT
Start: 2024-12-02 | End: 2024-12-05 | Stop reason: SDUPTHER

## 2024-12-02 RX ORDER — CITALOPRAM HYDROBROMIDE 40 MG/1
40 TABLET ORAL DAILY
Qty: 30 TABLET | Refills: 0 | Status: SHIPPED | OUTPATIENT
Start: 2024-12-02 | End: 2024-12-05 | Stop reason: SDUPTHER

## 2024-12-02 RX ORDER — GLIMEPIRIDE 4 MG/1
TABLET ORAL
Qty: 90 TABLET | Refills: 0 | Status: SHIPPED | OUTPATIENT
Start: 2024-12-02

## 2024-12-03 DIAGNOSIS — I48.91 ATRIAL FIBRILLATION WITH RVR: ICD-10-CM

## 2024-12-03 DIAGNOSIS — M15.0 PRIMARY OSTEOARTHRITIS INVOLVING MULTIPLE JOINTS: ICD-10-CM

## 2024-12-03 RX ORDER — CELECOXIB 200 MG/1
200 CAPSULE ORAL 2 TIMES DAILY
Qty: 60 CAPSULE | Refills: 0 | OUTPATIENT
Start: 2024-12-03

## 2024-12-03 RX ORDER — APIXABAN 5 MG/1
5 TABLET, FILM COATED ORAL EVERY 12 HOURS
Qty: 60 TABLET | Refills: 0 | OUTPATIENT
Start: 2024-12-03

## 2024-12-05 ENCOUNTER — OFFICE VISIT (OUTPATIENT)
Dept: FAMILY MEDICINE CLINIC | Facility: CLINIC | Age: 49
End: 2024-12-05
Payer: COMMERCIAL

## 2024-12-05 VITALS
HEIGHT: 72 IN | SYSTOLIC BLOOD PRESSURE: 136 MMHG | OXYGEN SATURATION: 97 % | WEIGHT: 315 LBS | DIASTOLIC BLOOD PRESSURE: 88 MMHG | HEART RATE: 86 BPM | TEMPERATURE: 97.3 F | BODY MASS INDEX: 42.66 KG/M2

## 2024-12-05 DIAGNOSIS — I48.91 ATRIAL FIBRILLATION WITH RVR: ICD-10-CM

## 2024-12-05 DIAGNOSIS — E11.65 TYPE 2 DIABETES MELLITUS WITH HYPERGLYCEMIA, WITHOUT LONG-TERM CURRENT USE OF INSULIN: Primary | ICD-10-CM

## 2024-12-05 DIAGNOSIS — M15.0 PRIMARY OSTEOARTHRITIS INVOLVING MULTIPLE JOINTS: ICD-10-CM

## 2024-12-05 DIAGNOSIS — M54.12 CERVICAL NERVE ROOT DISORDER: ICD-10-CM

## 2024-12-05 DIAGNOSIS — F41.8 DEPRESSION WITH ANXIETY: ICD-10-CM

## 2024-12-05 DIAGNOSIS — I10 BENIGN ESSENTIAL HTN: ICD-10-CM

## 2024-12-05 PROCEDURE — 99214 OFFICE O/P EST MOD 30 MIN: CPT | Performed by: FAMILY MEDICINE

## 2024-12-05 RX ORDER — METFORMIN HYDROCHLORIDE 500 MG/1
500 TABLET, EXTENDED RELEASE ORAL
Qty: 90 TABLET | Refills: 1 | Status: SHIPPED | OUTPATIENT
Start: 2024-12-05

## 2024-12-05 RX ORDER — CITALOPRAM HYDROBROMIDE 40 MG/1
40 TABLET ORAL DAILY
Qty: 90 TABLET | Refills: 1 | Status: SHIPPED | OUTPATIENT
Start: 2024-12-05

## 2024-12-05 RX ORDER — CYCLOBENZAPRINE HCL 10 MG
10 TABLET ORAL 3 TIMES DAILY PRN
Qty: 90 TABLET | Refills: 5 | Status: SHIPPED | OUTPATIENT
Start: 2024-12-05

## 2024-12-05 RX ORDER — CELECOXIB 200 MG/1
200 CAPSULE ORAL 2 TIMES DAILY
Qty: 180 CAPSULE | Refills: 1 | Status: SHIPPED | OUTPATIENT
Start: 2024-12-05

## 2024-12-05 RX ORDER — LISINOPRIL 20 MG/1
20 TABLET ORAL DAILY
Qty: 90 TABLET | Refills: 1 | Status: SHIPPED | OUTPATIENT
Start: 2024-12-05

## 2024-12-05 RX ORDER — METOPROLOL SUCCINATE 100 MG/1
50 TABLET, EXTENDED RELEASE ORAL 2 TIMES DAILY
Qty: 180 TABLET | Refills: 1 | Status: SHIPPED | OUTPATIENT
Start: 2024-12-05

## 2024-12-06 ENCOUNTER — TELEPHONE (OUTPATIENT)
Dept: FAMILY MEDICINE CLINIC | Facility: CLINIC | Age: 49
End: 2024-12-06

## 2024-12-06 NOTE — PROGRESS NOTES
Subjective   Santiago Zamora is a 49 y.o. male with   Chief Complaint   Patient presents with    Diabetes     Labs prior    Hypertension    Hyperlipidemia    Depression     With anxiety    Vitamin D Deficiency   .    Diabetes  Hypoglycemia symptoms include nervousness/anxiousness.   Hypertension  Associated symptoms include neck pain.   Hyperlipidemia    DepressionSymptoms include nervousness/anxiety.   His past medical history is significant for depression.      49-year-old white male with history of type II non-insulin-dependent diabetes mellitus, hypertension, hyperlipidemia as well as vitamin D deficiency.  Patient also has history of depression with anxiety features.  He was using semaglutide for his diabetes but had difficulty tolerating this secondary to nausea.  He is also not been using Farxiga.  Current medications include Eliquis for paroxysmal atrial fibrillation.  Other medications include atorvastatin, Celebrex, citalopram as well as glimepiride.  He is also using lisinopril, metformin ER as well as Toprol-XL.  Sotalol is also used as well as tadalafil.  All medications are used appropriately and well-tolerated without side effects.  Fasting labs have been acquired prior to this visit.  He has a long history of morbid exogenous obesity.  The following portions of the patient's history were reviewed and updated as appropriate: allergies, current medications, past family history, past medical history, past social history, past surgical history and problem list.    Review of Systems   Cardiovascular:         Hypertension, hyperlipidemia, paroxysmal atrial fibrillation   Endocrine:        Morbid exogenous obesity, vitamin D deficiency, type II non-insulin-dependent diabetes mellitus   Genitourinary:         Erectile dysfunction   Musculoskeletal:  Positive for neck pain and neck stiffness.   Psychiatric/Behavioral:  Positive for dysphoric mood. The patient is nervous/anxious.        Objective     Vitals:     12/05/24 1429   BP: 136/88   Pulse: 86   Temp: 97.3 °F (36.3 °C)   SpO2: 97%       Recent Results (from the past 4 weeks)   Comprehensive metabolic panel    Collection Time: 11/26/24 10:42 AM    Specimen: Blood   Result Value Ref Range    Glucose 252 (H) 65 - 99 mg/dL    BUN 13 6 - 20 mg/dL    Creatinine 0.69 (L) 0.76 - 1.27 mg/dL    EGFR Result 114.9 >60.0 mL/min/1.73    BUN/Creatinine Ratio 18.8 7.0 - 25.0    Sodium 136 136 - 145 mmol/L    Potassium 4.6 3.5 - 5.2 mmol/L    Chloride 96 (L) 98 - 107 mmol/L    Total CO2 27.4 22.0 - 29.0 mmol/L    Calcium 9.6 8.6 - 10.5 mg/dL    Total Protein 6.7 6.0 - 8.5 g/dL    Albumin 4.3 3.5 - 5.2 g/dL    Globulin 2.4 gm/dL    A/G Ratio 1.8 g/dL    Total Bilirubin 0.4 0.0 - 1.2 mg/dL    Alkaline Phosphatase 78 39 - 117 U/L    AST (SGOT) 10 1 - 40 U/L    ALT (SGPT) 15 1 - 41 U/L   PSA DIAGNOSTIC ONLY    Collection Time: 11/26/24 10:42 AM    Specimen: Blood   Result Value Ref Range    PSA 1.460 0.000 - 4.000 ng/mL   Vitamin D 25 hydroxy    Collection Time: 11/26/24 10:42 AM    Specimen: Blood   Result Value Ref Range    25 Hydroxy, Vitamin D 8.0 (L) 30.0 - 100.0 ng/ml   TSH    Collection Time: 11/26/24 10:42 AM    Specimen: Blood   Result Value Ref Range    TSH 0.881 0.270 - 4.200 uIU/mL   Lipid panel    Collection Time: 11/26/24 10:42 AM    Specimen: Blood   Result Value Ref Range    Total Cholesterol 144 0 - 200 mg/dL    Triglycerides 320 (H) 0 - 150 mg/dL    HDL Cholesterol 27 (L) 40 - 60 mg/dL    VLDL Cholesterol Burke 51 (H) 5 - 40 mg/dL    LDL Chol Calc (NIH) 66 0 - 100 mg/dL   Hemoglobin A1c    Collection Time: 11/26/24 10:42 AM    Specimen: Blood   Result Value Ref Range    Hemoglobin A1C 11.30 (H) 4.80 - 5.60 %   CBC w AUTO Differential    Collection Time: 11/26/24 10:42 AM    Specimen: Blood   Result Value Ref Range    WBC 6.08 3.40 - 10.80 10*3/mm3    RBC 5.80 4.14 - 5.80 10*6/mm3    Hemoglobin 15.8 13.0 - 17.7 g/dL    Hematocrit 47.4 37.5 - 51.0 %    MCV 81.7 79.0 - 97.0  fL    MCH 27.2 26.6 - 33.0 pg    MCHC 33.3 31.5 - 35.7 g/dL    RDW 14.5 12.3 - 15.4 %    Platelets 193 140 - 450 10*3/mm3    Neutrophil Rel % 67.4 42.7 - 76.0 %    Lymphocyte Rel % 18.9 (L) 19.6 - 45.3 %    Monocyte Rel % 6.9 5.0 - 12.0 %    Eosinophil Rel % 5.3 0.3 - 6.2 %    Basophil Rel % 1.0 0.0 - 1.5 %    Neutrophils Absolute 4.10 1.70 - 7.00 10*3/mm3    Lymphocytes Absolute 1.15 0.70 - 3.10 10*3/mm3    Monocytes Absolute 0.42 0.10 - 0.90 10*3/mm3    Eosinophils Absolute 0.32 0.00 - 0.40 10*3/mm3    Basophils Absolute 0.06 0.00 - 0.20 10*3/mm3    Immature Granulocyte Rel % 0.5 0.0 - 0.5 %    Immature Grans Absolute 0.03 0.00 - 0.05 10*3/mm3    nRBC 0.0 0.0 - 0.2 /100 WBC       Physical Exam  Vitals and nursing note reviewed.   Constitutional:       Appearance: Normal appearance. He is well-developed and well-groomed. He is morbidly obese.      Comments: Morbid exogenous obesity with a BMI of 43.7   HENT:      Head: Normocephalic and atraumatic.   Neck:      Thyroid: No thyroid mass or thyromegaly.      Vascular: Normal carotid pulses. No carotid bruit.      Trachea: Trachea and phonation normal.   Cardiovascular:      Rate and Rhythm: Normal rate and regular rhythm.      Heart sounds: Normal heart sounds. No murmur heard.     No friction rub. No gallop.   Pulmonary:      Effort: Pulmonary effort is normal. No respiratory distress.      Breath sounds: Normal breath sounds. No decreased breath sounds, wheezing, rhonchi or rales.   Musculoskeletal:      Cervical back: Neck supple.   Lymphadenopathy:      Cervical: No cervical adenopathy.   Skin:     General: Skin is warm and dry.      Findings: No rash.   Neurological:      Mental Status: He is alert and oriented to person, place, and time.   Psychiatric:         Attention and Perception: Attention and perception normal.         Mood and Affect: Mood and affect normal.         Speech: Speech normal.         Behavior: Behavior normal. Behavior is cooperative.          Thought Content: Thought content normal.         Cognition and Memory: Cognition and memory normal.         Judgment: Judgment normal.         Assessment & Plan   There are no diagnoses linked to this encounter.    No follow-ups on file.

## 2024-12-06 NOTE — TELEPHONE ENCOUNTER
Patient is requesting a prescription for a blood glucose meter to test his blood sugar. Please advise.

## 2024-12-06 NOTE — TELEPHONE ENCOUNTER
Caller: Santiago Zamora    Relationship: Self    Best call back number: [phone]     What medication are you requesting: GLUCOSE MONITOR     What are your current symptoms: TO TEST SUGAR      If a prescription is needed, what is your preferred pharmacy and phone number:  [facility] - [place], [place] - [address] [facility] [phone] [facility] [phone] FX

## 2024-12-09 DIAGNOSIS — E11.65 TYPE 2 DIABETES MELLITUS WITH HYPERGLYCEMIA, WITHOUT LONG-TERM CURRENT USE OF INSULIN: Primary | ICD-10-CM

## 2024-12-09 NOTE — TELEPHONE ENCOUNTER
Attempted to contact patient regarding glucose monitor. Would like to know if he has a preference or knows what insurance will cover. LVM for pt to return call.    ------------    Called patients pharmacy regarding glucometer informed them that I had reached out to the patient regarding what is covered or if he had a preference. They stated they are not able to look up what was originally sent in so could not run a test claim to see what insurance prefers.

## 2024-12-09 NOTE — TELEPHONE ENCOUNTER
Caller: [facility] - [place], [place] - [address] - [phone] [facility] [phone] FX    Relationship to patient: Pharmacy    Best call back number: [phone]    Patient is needing: REPLACEMENT FOR Blood Glucose Monitoring Suppl ([employer]-Care Glucometer) w/Device kit. PHARMACY IS UNABLE TO ORDER THIS.

## 2024-12-10 ENCOUNTER — OFFICE VISIT (OUTPATIENT)
Age: 49
End: 2024-12-10
Payer: COMMERCIAL

## 2024-12-10 VITALS
SYSTOLIC BLOOD PRESSURE: 142 MMHG | DIASTOLIC BLOOD PRESSURE: 74 MMHG | HEART RATE: 70 BPM | HEIGHT: 72 IN | BODY MASS INDEX: 42.66 KG/M2 | WEIGHT: 315 LBS

## 2024-12-10 DIAGNOSIS — I48.0 PAROXYSMAL ATRIAL FIBRILLATION: Primary | ICD-10-CM

## 2024-12-10 DIAGNOSIS — Z79.899 LONG TERM CURRENT USE OF ANTIARRHYTHMIC MEDICAL THERAPY: ICD-10-CM

## 2024-12-10 DIAGNOSIS — I48.91 ATRIAL FIBRILLATION WITH RVR: ICD-10-CM

## 2024-12-10 DIAGNOSIS — E88.810 METABOLIC SYNDROME: ICD-10-CM

## 2024-12-10 DIAGNOSIS — E11.65 TYPE 2 DIABETES MELLITUS WITH HYPERGLYCEMIA, WITHOUT LONG-TERM CURRENT USE OF INSULIN: ICD-10-CM

## 2024-12-10 PROCEDURE — 93000 ELECTROCARDIOGRAM COMPLETE: CPT | Performed by: PHYSICIAN ASSISTANT

## 2024-12-10 PROCEDURE — 99214 OFFICE O/P EST MOD 30 MIN: CPT | Performed by: PHYSICIAN ASSISTANT

## 2024-12-10 RX ORDER — SOTALOL HYDROCHLORIDE 120 MG/1
120 TABLET ORAL EVERY 12 HOURS SCHEDULED
Qty: 180 TABLET | Refills: 2 | Status: SHIPPED | OUTPATIENT
Start: 2024-12-10

## 2024-12-10 RX ORDER — APIXABAN 5 MG/1
5 TABLET, FILM COATED ORAL EVERY 12 HOURS
Qty: 60 TABLET | Refills: 0 | OUTPATIENT
Start: 2024-12-10

## 2024-12-10 NOTE — PROGRESS NOTES
"      ELECTROPHYSIOLOGY   Date of Office Visit: 12/10/2024  Patient Name: Santiago Zamora  : 1975  Encounter Provider: Peng Steiner PA-C  Electrophysiologist: Dr. Sanders  CHIEF COMPLAINT / REASON FOR OFFICE VISIT     Follow-up and Atrial Fibrillation (Paroxysmal)  1.5 year follow up     HISTORY OF PRESENT ILLNESS     This is a 49 y.o. year old male who presents to [facility] for a 1 year follow up of cardiovascular health.     He has a history of paroxysmal atrial fibrillation that has been managed with sotalol 120 mg twice daily.    Today the patient reports he does not think he is had any episodes of atrial fibrillation in the past year.  He maybe had a couple of palpitations over the summer but they were brief and self-limited.  He denies any dizziness, chest pain, shortness of air or lower extremity edema.    His most recent A1c was 11.3% and he is working to get a glucometer at home.  He is severely uncontrolled diabetes as well as severe hypertriglyceridemia.    He was on Ozempic earlier this year and had issues with restless leg syndrome.  He is just been started on.  His Tirzepatide as an alternative last week to help with weight loss.  He is getting and steps at work does not do any formal exercise.    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    On apixaban 5 mg twice daily, denies bleeding.     PMHx:  PAF, HTN, HL, obesity with type II DM on insulin, vitamin D deficiency, metabolic syndrome, NSVT with normal heart cath     PHYSICAL EXAMINATION     Vital Signs:  /74 (BP Location: Left arm, Patient Position: Sitting, Cuff Size: Adult)   Pulse 70   Ht 182.9 cm (72\")   Wt (!) 144 kg (318 lb)   BMI 43.13 kg/m²   Estimated body mass index is 43.13 kg/m² as calculated from the following:    Height as of this encounter: 182.9 cm (72\").    Weight as of this encounter: 144 kg (318 lb).   "           Physical Exam  Constitutional:       Appearance: Normal appearance.   HENT:      Head: Normocephalic and atraumatic.   Cardiovascular:      Rate and Rhythm: Normal rate and regular rhythm.      Pulses: Normal pulses.      Heart sounds: Normal heart sounds.      Comments: Varicose veins in bilateral legs  Pulmonary:      Effort: Pulmonary effort is normal.      Breath sounds: Normal breath sounds.   Musculoskeletal:      Right lower leg: No edema.      Left lower leg: No edema.   Skin:     General: Skin is warm and dry.   Neurological:      General: No focal deficit present.      Mental Status: He is alert and oriented to person, place, and time.          Cardiac Testing/Results     Cardiac Testing:   -Heart cath in 2022: Normal coronary arteries    Result Review :  The following data was reviewed by: Peng Steiner PA-C on 12/10/2024:    Lipid Panel          11/26/2024    10:42   Lipid Panel   Total Cholesterol 144    Triglycerides 320    HDL Cholesterol 27    VLDL Cholesterol 51    LDL Cholesterol  66       Lab Results   Component Value Date     11/26/2024     07/26/2023    K 4.6 11/26/2024    K 5.0 07/26/2023    CL 96 (L) 11/26/2024    CL 96 (L) 07/26/2023    CO2 27.4 11/26/2024    CO2 27.9 07/26/2023    BUN 13 11/26/2024    BUN 13 07/26/2023    CREATININE 0.69 (L) 11/26/2024    CREATININE 0.67 (L) 07/26/2023    EGFRIFNONA 107 02/02/2022    EGFRIFNONA 96 03/18/2020    EGFRIFAFRI 124 02/02/2022    EGFRIFAFRI 116 03/18/2020    GLUCOSE 252 (H) 11/26/2024    GLUCOSE 260 (H) 07/26/2023    CALCIUM 9.6 11/26/2024    CALCIUM 9.5 07/26/2023    ALBUMIN 4.3 11/26/2024    ALBUMIN 4.1 07/26/2023    AST 10 11/26/2024    AST 13 07/26/2023    ALT 15 11/26/2024    ALT 20 07/26/2023     Lab Results   Component Value Date    WBC 6.08 11/26/2024    WBC 6.31 07/26/2023    HGB 15.8 11/26/2024    HGB 15.5 07/26/2023    HCT 47.4 11/26/2024    HCT 48.3 07/26/2023    MCV 81.7 11/26/2024    MCV 81.7 07/26/2023      11/26/2024     07/26/2023     Lab Results   Component Value Date    PROBNP 35.5 04/07/2022     Lab Results   Component Value Date    CKTOTAL 67 03/25/2020    TROPONINT <0.010 04/13/2022     Lab Results   Component Value Date    TSH 0.881 11/26/2024    TSH 1.090 07/26/2023                 ECG 12 Lead    Date/Time: 12/10/2024 10:07 AM  Performed by: Peng Calvo PA-C    Authorized by: Peng Calvo PA-C  Comparison: compared with previous ECG from 8/4/2023  Rhythm: sinus rhythm  Rate: normal  BPM: 69  Conduction: left anterior fascicular block  T inversion: III  QRS axis: left  Comments: QTc 443.  No new T wave changes.                ASSESSMENT & PLAN       Diagnoses and all orders for this visit:    1. Paroxysmal atrial fibrillation (Primary)  He has been on sotalol 120 mg twice daily for rhythm control.  QTc interval today 443  He has had no symptomatic episodes of A-fib in the past year.  Will continue him on current regimen  Continue Toprol 50 mg twice daily  Continue apixaban 5 mg twice daily, denies bleeding complications  2. Long term current use of antiarrhythmic medical therapy  He has been on sotalol since around 2022 when he had an episode of symptomatic palpitations bring him to the ER.  At that time he had been having NSVT however this is not recurred.  3. Metabolic syndrome  He has abdominal obesity, severe hypertriglyceridemia, elevated blood sugars and hypertension.  Significant lifestyle changes would be recommended with increasing his protein and reducing intake of processed foods.  Also would help if he started doing some routine exercise  Did have a recent normal heart cath.  4. Type 2 diabetes mellitus with hyperglycemia, without long-term current use of insulin  Uncontrolled.  Most recent A1c was 11.3%  He is now going to get a glucometer to start checking blood sugars at home  He was started on a new GLP-1 agonist last week by PCP.      Follow Up:  Return in  about 1 year (around 12/10/2025) for Dr. Ana Carrillo.  Patient was given instructions and counseling regarding his condition or for health maintenance advice. Please contact office if worsening symptoms or proceed to ER when appropriate.      Peng Steiner PA-C  12/10/24  09:48 EST    MEDICATIONS         Discharge Medications            Accurate as of December 10, 2024  9:48 AM. If you have any questions, ask your nurse or doctor.                Continue These Medications        Instructions Start Date   apixaban 5 MG tablet tablet  Commonly known as: Eliquis   5 mg, Oral, Every 12 Hours      atorvastatin 40 MG tablet  Commonly known as: LIPITOR   40 mg, Oral, Daily      celecoxib 200 MG capsule  Commonly known as: CeleBREX   200 mg, Oral, 2 Times Daily      citalopram 40 MG tablet  Commonly known as: CeleXA   40 mg, Oral, Daily      cyclobenzaprine 10 MG tablet  Commonly known as: FLEXERIL   10 mg, Oral, 3 Times Daily PRN, for muscle spams      D-Care Glucometer w/Device kit   1 Units, Not Applicable, Daily      glimepiride 4 MG tablet  Commonly known as: AMARYL   TAKE 1 TABLET BY MOUTH IN THE MORNING BEFORE BREAKFAST      glucose blood test strip   Use as instructed      lisinopril 20 MG tablet  Commonly known as: PRINIVIL,ZESTRIL   20 mg, Oral, Daily      metFORMIN  MG 24 hr tablet  Commonly known as: GLUCOPHAGE-XR   500 mg, Oral, Daily With Breakfast      metoprolol succinate  MG 24 hr tablet  Commonly known as: TOPROL-XL   50 mg, Oral, 2 Times Daily      sotalol 120 MG tablet  Commonly known as: BETAPACE   120 mg, Oral, Every 12 Hours Scheduled      tadalafil 5 MG tablet  Commonly known as: CIALIS   5 mg, Oral, Daily PRN      Tirzepatide 2.5 MG/0.5ML solution auto-injector   2.5 mg, Subcutaneous, Weekly                   **Dragon Disclaimer: This note was dictated using an electronic transcription. The electronic translation of spoken language may permit erroneous, or at times,  nonsensical words or phrases to be inadvertently transcribed. Although I have reviewed the note for such errors, some may still exist.

## 2024-12-19 DIAGNOSIS — I48.0 PAROXYSMAL ATRIAL FIBRILLATION: ICD-10-CM

## 2024-12-19 RX ORDER — SOTALOL HYDROCHLORIDE 120 MG/1
120 TABLET ORAL EVERY 12 HOURS SCHEDULED
Qty: 180 TABLET | Refills: 3 | Status: SHIPPED | OUTPATIENT
Start: 2024-12-19

## 2024-12-23 DIAGNOSIS — I48.91 ATRIAL FIBRILLATION WITH RVR: ICD-10-CM

## 2024-12-23 RX ORDER — APIXABAN 5 MG/1
5 TABLET, FILM COATED ORAL
Qty: 60 TABLET | Refills: 0 | Status: SHIPPED | OUTPATIENT
Start: 2024-12-23

## 2024-12-27 DIAGNOSIS — E11.65 TYPE 2 DIABETES MELLITUS WITH HYPERGLYCEMIA, WITHOUT LONG-TERM CURRENT USE OF INSULIN: ICD-10-CM

## 2024-12-27 DIAGNOSIS — F41.8 DEPRESSION WITH ANXIETY: ICD-10-CM

## 2024-12-27 DIAGNOSIS — I10 BENIGN ESSENTIAL HTN: ICD-10-CM

## 2024-12-27 DIAGNOSIS — E78.2 MIXED HYPERLIPIDEMIA: ICD-10-CM

## 2024-12-27 DIAGNOSIS — M15.0 PRIMARY OSTEOARTHRITIS INVOLVING MULTIPLE JOINTS: ICD-10-CM

## 2024-12-27 DIAGNOSIS — I48.0 PAROXYSMAL ATRIAL FIBRILLATION: ICD-10-CM

## 2024-12-27 DIAGNOSIS — I48.91 ATRIAL FIBRILLATION WITH RVR: ICD-10-CM

## 2024-12-27 DIAGNOSIS — N52.8 OTHER MALE ERECTILE DYSFUNCTION: ICD-10-CM

## 2024-12-27 RX ORDER — CELECOXIB 200 MG/1
200 CAPSULE ORAL 2 TIMES DAILY
Qty: 180 CAPSULE | Refills: 1 | OUTPATIENT
Start: 2024-12-27

## 2024-12-27 RX ORDER — SOTALOL HYDROCHLORIDE 120 MG/1
120 TABLET ORAL EVERY 12 HOURS SCHEDULED
Qty: 180 TABLET | Refills: 3 | Status: SHIPPED | OUTPATIENT
Start: 2024-12-27

## 2024-12-27 RX ORDER — METFORMIN HYDROCHLORIDE 500 MG/1
500 TABLET, EXTENDED RELEASE ORAL
Qty: 90 TABLET | Refills: 1 | OUTPATIENT
Start: 2024-12-27

## 2024-12-27 RX ORDER — CITALOPRAM HYDROBROMIDE 40 MG/1
40 TABLET ORAL DAILY
Qty: 90 TABLET | Refills: 1 | OUTPATIENT
Start: 2024-12-27

## 2024-12-27 RX ORDER — CELECOXIB 200 MG/1
200 CAPSULE ORAL 2 TIMES DAILY
Qty: 60 CAPSULE | Refills: 0 | OUTPATIENT
Start: 2024-12-27

## 2024-12-27 RX ORDER — ATORVASTATIN CALCIUM 40 MG/1
40 TABLET, FILM COATED ORAL DAILY
Qty: 90 TABLET | Refills: 0 | OUTPATIENT
Start: 2024-12-27

## 2024-12-27 RX ORDER — LISINOPRIL 20 MG/1
20 TABLET ORAL DAILY
Qty: 90 TABLET | Refills: 1 | OUTPATIENT
Start: 2024-12-27

## 2024-12-27 RX ORDER — TADALAFIL 5 MG/1
5 TABLET ORAL DAILY PRN
Qty: 30 TABLET | Refills: 5 | OUTPATIENT
Start: 2024-12-27

## 2024-12-27 RX ORDER — CYCLOBENZAPRINE HCL 10 MG
10 TABLET ORAL 3 TIMES DAILY PRN
Qty: 90 TABLET | Refills: 5 | OUTPATIENT
Start: 2024-12-27

## 2024-12-28 DIAGNOSIS — E11.65 TYPE 2 DIABETES MELLITUS WITH HYPERGLYCEMIA, WITHOUT LONG-TERM CURRENT USE OF INSULIN: ICD-10-CM

## 2024-12-30 RX ORDER — TIRZEPATIDE 2.5 MG/.5ML
INJECTION, SOLUTION SUBCUTANEOUS
Qty: 4 ML | Refills: 0 | OUTPATIENT
Start: 2024-12-30

## 2024-12-31 ENCOUNTER — TELEPHONE (OUTPATIENT)
Dept: FAMILY MEDICINE CLINIC | Facility: CLINIC | Age: 49
End: 2024-12-31

## 2024-12-31 DIAGNOSIS — M15.0 PRIMARY OSTEOARTHRITIS INVOLVING MULTIPLE JOINTS: ICD-10-CM

## 2024-12-31 RX ORDER — CELECOXIB 200 MG/1
200 CAPSULE ORAL 2 TIMES DAILY
Qty: 60 CAPSULE | Refills: 0 | OUTPATIENT
Start: 2024-12-31

## 2024-12-31 NOTE — TELEPHONE ENCOUNTER
[place] patient:    PATIENT IS CALLING WANTED TO SEE IF DR PRESLEY COULD ORDER SOME COUGH MEDICATION FOR HIM      COUGH STARTED A DAY AGO DRY COUGH AND TINGLING IN THE BACK OF HIS THROAT     Please advise.

## 2024-12-31 NOTE — TELEPHONE ENCOUNTER
Caller: Santiago Zamora    Relationship: Self    Best call back number: [phone]      What was the call regarding: PATIENT IS CALLING WANTED TO SEE IF DR PRESLEY COULD ORDER SOME COUGH MEDICATION FOR HIM     COUGH STARTED A DAY AGO DRY COUGH AND TINGLING IN THE BACK OF HIS THROAT       [id number] - [place], [place] - [address] [phone] [facility] [phone] FX

## 2025-01-06 DIAGNOSIS — E11.65 TYPE 2 DIABETES MELLITUS WITH HYPERGLYCEMIA, WITHOUT LONG-TERM CURRENT USE OF INSULIN: ICD-10-CM

## 2025-01-06 DIAGNOSIS — M15.0 PRIMARY OSTEOARTHRITIS INVOLVING MULTIPLE JOINTS: ICD-10-CM

## 2025-01-07 DIAGNOSIS — E11.65 TYPE 2 DIABETES MELLITUS WITH HYPERGLYCEMIA, WITHOUT LONG-TERM CURRENT USE OF INSULIN: Primary | ICD-10-CM

## 2025-01-07 RX ORDER — CELECOXIB 200 MG/1
200 CAPSULE ORAL 2 TIMES DAILY
Qty: 180 CAPSULE | Refills: 1 | OUTPATIENT
Start: 2025-01-07

## 2025-01-09 DIAGNOSIS — M15.0 PRIMARY OSTEOARTHRITIS INVOLVING MULTIPLE JOINTS: ICD-10-CM

## 2025-01-09 RX ORDER — CELECOXIB 200 MG/1
200 CAPSULE ORAL 2 TIMES DAILY
Qty: 180 CAPSULE | Refills: 1 | Status: SHIPPED | OUTPATIENT
Start: 2025-01-09

## 2025-01-09 RX ORDER — CELECOXIB 200 MG/1
200 CAPSULE ORAL 2 TIMES DAILY
Qty: 60 CAPSULE | Refills: 0 | OUTPATIENT
Start: 2025-01-09

## 2025-01-09 NOTE — TELEPHONE ENCOUNTER
Caller: Suzy Zamora    Relationship: Emergency Contact    Best call back number: [phone]      What was the call regarding: PATIENTS WIFE CALLING STATES [facility] NEED DOCTORS APPROVAL FOR THIS MEDICATION BEFORE FILLING celecoxib (CeleBREX) 200 MG capsule     PATIENT IS GOING OUT OF TOWN TONIGHT     PLEASE GIVE CALLBACK

## 2025-01-09 NOTE — TELEPHONE ENCOUNTER
Spoke with patient's pharmacy they claim not to have received the previous refill sent in on 12/5/2024. Re-sending.

## 2025-01-22 DIAGNOSIS — I48.91 ATRIAL FIBRILLATION WITH RVR: ICD-10-CM

## 2025-01-22 DIAGNOSIS — F41.8 DEPRESSION WITH ANXIETY: ICD-10-CM

## 2025-01-22 DIAGNOSIS — E11.65 TYPE 2 DIABETES MELLITUS WITH HYPERGLYCEMIA, WITHOUT LONG-TERM CURRENT USE OF INSULIN: ICD-10-CM

## 2025-01-22 RX ORDER — APIXABAN 5 MG/1
5 TABLET, FILM COATED ORAL EVERY 12 HOURS SCHEDULED
Qty: 60 TABLET | Refills: 0 | Status: SHIPPED | OUTPATIENT
Start: 2025-01-22

## 2025-01-22 RX ORDER — METFORMIN HYDROCHLORIDE 500 MG/1
500 TABLET, EXTENDED RELEASE ORAL
Qty: 30 TABLET | Refills: 0 | OUTPATIENT
Start: 2025-01-22

## 2025-01-22 RX ORDER — CITALOPRAM HYDROBROMIDE 40 MG/1
40 TABLET ORAL DAILY
Qty: 30 TABLET | Refills: 0 | OUTPATIENT
Start: 2025-01-22

## 2025-01-30 DIAGNOSIS — E11.65 TYPE 2 DIABETES MELLITUS WITH HYPERGLYCEMIA, WITHOUT LONG-TERM CURRENT USE OF INSULIN: ICD-10-CM

## 2025-01-30 DIAGNOSIS — F41.8 DEPRESSION WITH ANXIETY: ICD-10-CM

## 2025-01-30 RX ORDER — CITALOPRAM HYDROBROMIDE 40 MG/1
40 TABLET ORAL DAILY
Qty: 30 TABLET | Refills: 0 | OUTPATIENT
Start: 2025-01-30

## 2025-01-30 RX ORDER — CYCLOBENZAPRINE HCL 10 MG
10 TABLET ORAL 3 TIMES DAILY PRN
Qty: 90 TABLET | Refills: 5 | OUTPATIENT
Start: 2025-01-30

## 2025-01-30 RX ORDER — METFORMIN HYDROCHLORIDE 500 MG/1
500 TABLET, EXTENDED RELEASE ORAL
Qty: 30 TABLET | Refills: 0 | OUTPATIENT
Start: 2025-01-30

## 2025-01-30 RX ORDER — METFORMIN HYDROCHLORIDE 500 MG/1
500 TABLET, EXTENDED RELEASE ORAL
Qty: 90 TABLET | Refills: 1 | OUTPATIENT
Start: 2025-01-30

## 2025-01-31 RX ORDER — METFORMIN HYDROCHLORIDE 500 MG/1
500 TABLET, EXTENDED RELEASE ORAL
Qty: 30 TABLET | Refills: 0 | OUTPATIENT
Start: 2025-01-31

## 2025-02-03 DIAGNOSIS — E11.65 TYPE 2 DIABETES MELLITUS WITH HYPERGLYCEMIA, WITHOUT LONG-TERM CURRENT USE OF INSULIN: ICD-10-CM

## 2025-02-03 RX ORDER — METFORMIN HYDROCHLORIDE 500 MG/1
500 TABLET, EXTENDED RELEASE ORAL
Qty: 90 TABLET | Refills: 1 | Status: SHIPPED | OUTPATIENT
Start: 2025-02-03

## 2025-02-04 DIAGNOSIS — E11.65 TYPE 2 DIABETES MELLITUS WITH HYPERGLYCEMIA, WITHOUT LONG-TERM CURRENT USE OF INSULIN: ICD-10-CM

## 2025-02-04 RX ORDER — TIRZEPATIDE 5 MG/.5ML
1 INJECTION, SOLUTION SUBCUTANEOUS WEEKLY
Qty: 4 ML | Refills: 0 | Status: SHIPPED | OUTPATIENT
Start: 2025-02-04

## 2025-02-08 DIAGNOSIS — F41.8 DEPRESSION WITH ANXIETY: ICD-10-CM

## 2025-02-10 ENCOUNTER — TELEPHONE (OUTPATIENT)
Dept: FAMILY MEDICINE CLINIC | Facility: CLINIC | Age: 50
End: 2025-02-10

## 2025-02-10 RX ORDER — CITALOPRAM HYDROBROMIDE 40 MG/1
40 TABLET ORAL DAILY
Qty: 30 TABLET | Refills: 0 | OUTPATIENT
Start: 2025-02-10

## 2025-02-10 NOTE — TELEPHONE ENCOUNTER
Caller: Suzy Zamora    Relationship: Emergency Contact    Best call back number: [phone]     What medication are you requesting: citalopram (CeleXA) 40 MG tablet     If a prescription is needed, what is your preferred pharmacy and phone number: [facility] [address] [place], [place] - [address] [phone] [facility] [phone]      Additional notes: PATIENT'S WIFE STATES THAT LAST REFILL HE PICKED UP AT PHARMACY WAS ONLY DISPENSED FOR QUANTITY 30, AND HE HAS RUN OUT. NEEDS PRESCRIPTION FOR ADDITIONAL REFILL, AND REQUESTS 90 DAY SUPPLY. CALL IF ADDITIONAL FOLLOW UP NEEDED.

## 2025-02-11 DIAGNOSIS — F41.8 DEPRESSION WITH ANXIETY: ICD-10-CM

## 2025-02-11 RX ORDER — CITALOPRAM HYDROBROMIDE 40 MG/1
40 TABLET ORAL DAILY
Qty: 90 TABLET | Refills: 1 | Status: SHIPPED | OUTPATIENT
Start: 2025-02-11

## 2025-02-11 NOTE — TELEPHONE ENCOUNTER
Spoke with the pharmacy, they state the last prescription that was sent in on 12/5/2024 for a 90 day supply with 1 refill came through, but was immediately canceled. They asked me to re-send the prescription. Re-sent.

## 2025-02-11 NOTE — TELEPHONE ENCOUNTER
Spoke with pharmacy, they state the last prescription that was sent on 12/5/2024 for a 90 day supply with a refill came through, but was immediately canceled. They asked me to re-send.

## 2025-02-20 DIAGNOSIS — E78.2 MIXED HYPERLIPIDEMIA: ICD-10-CM

## 2025-02-20 RX ORDER — ATORVASTATIN CALCIUM 40 MG/1
40 TABLET, FILM COATED ORAL DAILY
Qty: 90 TABLET | Refills: 0 | Status: SHIPPED | OUTPATIENT
Start: 2025-02-20

## 2025-02-27 DIAGNOSIS — I48.91 ATRIAL FIBRILLATION WITH RVR: ICD-10-CM

## 2025-02-27 RX ORDER — APIXABAN 5 MG/1
5 TABLET, FILM COATED ORAL EVERY 12 HOURS SCHEDULED
Qty: 60 TABLET | Refills: 0 | OUTPATIENT
Start: 2025-02-27

## 2025-02-27 RX ORDER — APIXABAN 5 MG/1
5 TABLET, FILM COATED ORAL EVERY 12 HOURS SCHEDULED
Qty: 60 TABLET | Refills: 0 | Status: SHIPPED | OUTPATIENT
Start: 2025-02-27

## 2025-03-02 DIAGNOSIS — E11.65 TYPE 2 DIABETES MELLITUS WITH HYPERGLYCEMIA, WITHOUT LONG-TERM CURRENT USE OF INSULIN: ICD-10-CM

## 2025-03-03 RX ORDER — TIRZEPATIDE 5 MG/.5ML
1 INJECTION, SOLUTION SUBCUTANEOUS WEEKLY
Qty: 4 ML | Refills: 0 | Status: SHIPPED | OUTPATIENT
Start: 2025-03-03

## 2025-03-04 NOTE — TELEPHONE ENCOUNTER
HUB TO RELAY:   Tried to call patient to schedule an office visit with Dr. Stoner to discuss his weight loss medication.   Patient did not answer, LVM.

## 2025-03-18 DIAGNOSIS — E11.65 UNCONTROLLED TYPE 2 DIABETES MELLITUS WITH HYPERGLYCEMIA: ICD-10-CM

## 2025-03-18 RX ORDER — GLIMEPIRIDE 4 MG/1
4 TABLET ORAL
Qty: 90 TABLET | Refills: 0 | Status: SHIPPED | OUTPATIENT
Start: 2025-03-18

## 2025-03-25 DIAGNOSIS — I10 BENIGN ESSENTIAL HTN: ICD-10-CM

## 2025-03-25 DIAGNOSIS — E78.2 MIXED HYPERLIPIDEMIA: ICD-10-CM

## 2025-03-25 DIAGNOSIS — N52.8 OTHER MALE ERECTILE DYSFUNCTION: ICD-10-CM

## 2025-03-25 DIAGNOSIS — F41.8 DEPRESSION WITH ANXIETY: ICD-10-CM

## 2025-03-25 DIAGNOSIS — E11.65 TYPE 2 DIABETES MELLITUS WITH HYPERGLYCEMIA, WITHOUT LONG-TERM CURRENT USE OF INSULIN: ICD-10-CM

## 2025-03-25 DIAGNOSIS — M15.0 PRIMARY OSTEOARTHRITIS INVOLVING MULTIPLE JOINTS: ICD-10-CM

## 2025-03-25 DIAGNOSIS — I48.91 ATRIAL FIBRILLATION WITH RVR: ICD-10-CM

## 2025-03-25 RX ORDER — TIRZEPATIDE 5 MG/.5ML
1 INJECTION, SOLUTION SUBCUTANEOUS WEEKLY
Qty: 4 ML | Refills: 0 | OUTPATIENT
Start: 2025-03-25

## 2025-03-25 RX ORDER — METFORMIN HYDROCHLORIDE 500 MG/1
500 TABLET, EXTENDED RELEASE ORAL
Qty: 90 TABLET | Refills: 1 | OUTPATIENT
Start: 2025-03-25

## 2025-03-25 RX ORDER — CELECOXIB 200 MG/1
200 CAPSULE ORAL 2 TIMES DAILY
Qty: 180 CAPSULE | Refills: 1 | OUTPATIENT
Start: 2025-03-25

## 2025-03-25 RX ORDER — CYCLOBENZAPRINE HCL 10 MG
10 TABLET ORAL 3 TIMES DAILY PRN
Qty: 90 TABLET | Refills: 5 | OUTPATIENT
Start: 2025-03-25

## 2025-03-25 RX ORDER — ATORVASTATIN CALCIUM 40 MG/1
40 TABLET, FILM COATED ORAL DAILY
Qty: 90 TABLET | Refills: 0 | OUTPATIENT
Start: 2025-03-25

## 2025-03-25 RX ORDER — TADALAFIL 5 MG/1
5 TABLET ORAL DAILY PRN
Qty: 30 TABLET | Refills: 5 | OUTPATIENT
Start: 2025-03-25

## 2025-03-25 RX ORDER — METOPROLOL SUCCINATE 100 MG/1
50 TABLET, EXTENDED RELEASE ORAL 2 TIMES DAILY
Qty: 180 TABLET | Refills: 1 | OUTPATIENT
Start: 2025-03-25

## 2025-03-25 RX ORDER — CITALOPRAM HYDROBROMIDE 40 MG/1
40 TABLET ORAL DAILY
Qty: 90 TABLET | Refills: 1 | OUTPATIENT
Start: 2025-03-25

## 2025-03-25 RX ORDER — LISINOPRIL 20 MG/1
20 TABLET ORAL DAILY
Qty: 90 TABLET | Refills: 1 | OUTPATIENT
Start: 2025-03-25

## 2025-03-28 ENCOUNTER — HOSPITAL ENCOUNTER (OUTPATIENT)
Facility: HOSPITAL | Age: 50
Setting detail: OBSERVATION
Discharge: HOME | End: 2025-03-29
Attending: EMERGENCY MEDICINE | Admitting: FAMILY MEDICINE
Payer: COMMERCIAL

## 2025-03-28 ENCOUNTER — APPOINTMENT (OUTPATIENT)
Dept: GENERAL RADIOLOGY | Facility: HOSPITAL | Age: 50
End: 2025-03-28
Payer: COMMERCIAL

## 2025-03-28 ENCOUNTER — APPOINTMENT (OUTPATIENT)
Dept: CT IMAGING | Facility: HOSPITAL | Age: 50
End: 2025-03-28
Payer: COMMERCIAL

## 2025-03-28 DIAGNOSIS — R47.89 WORD FINDING DIFFICULTY: ICD-10-CM

## 2025-03-28 DIAGNOSIS — R29.90 STROKE-LIKE SYMPTOMS: Primary | ICD-10-CM

## 2025-03-28 LAB
ABO GROUP BLD: NORMAL
ALBUMIN SERPL-MCNC: 3.9 G/DL (ref 3.5–5.2)
ALBUMIN/GLOB SERPL: 1.3 G/DL
ALP SERPL-CCNC: 95 U/L (ref 39–117)
ALT SERPL W P-5'-P-CCNC: 32 U/L (ref 1–41)
ANION GAP SERPL CALCULATED.3IONS-SCNC: 10.6 MMOL/L (ref 5–15)
APTT PPP: 31.2 SECONDS (ref 24.3–38.1)
AST SERPL-CCNC: 22 U/L (ref 1–40)
BASOPHILS # BLD AUTO: 0.02 10*3/MM3 (ref 0–0.2)
BASOPHILS NFR BLD AUTO: 0.4 % (ref 0–1.5)
BILIRUB SERPL-MCNC: 0.4 MG/DL (ref 0–1.2)
BILIRUB UR QL STRIP: NEGATIVE
BLD GP AB SCN SERPL QL: NEGATIVE
BUN SERPL-MCNC: 14 MG/DL (ref 6–20)
BUN/CREAT SERPL: 14.1 (ref 7–25)
CALCIUM SPEC-SCNC: 9.4 MG/DL (ref 8.6–10.5)
CHLORIDE SERPL-SCNC: 94 MMOL/L (ref 98–107)
CLARITY UR: CLEAR
CO2 SERPL-SCNC: 26.4 MMOL/L (ref 22–29)
COLOR UR: YELLOW
CREAT SERPL-MCNC: 0.99 MG/DL (ref 0.76–1.27)
DEPRECATED RDW RBC AUTO: 43 FL (ref 37–54)
EGFRCR SERPLBLD CKD-EPI 2021: 94 ML/MIN/1.73
EOSINOPHIL # BLD AUTO: 0.44 10*3/MM3 (ref 0–0.4)
EOSINOPHIL NFR BLD AUTO: 8.4 % (ref 0.3–6.2)
ERYTHROCYTE [DISTWIDTH] IN BLOOD BY AUTOMATED COUNT: 14.8 % (ref 12.3–15.4)
GLOBULIN UR ELPH-MCNC: 2.9 GM/DL
GLUCOSE BLDC GLUCOMTR-MCNC: 346 MG/DL (ref 70–130)
GLUCOSE SERPL-MCNC: 376 MG/DL (ref 65–99)
GLUCOSE UR STRIP-MCNC: ABNORMAL MG/DL
HCT VFR BLD AUTO: 46.9 % (ref 37.5–51)
HGB BLD-MCNC: 15.5 G/DL (ref 13–17.7)
HGB UR QL STRIP.AUTO: NEGATIVE
HOLD SPECIMEN: NORMAL
HOLD SPECIMEN: NORMAL
IMM GRANULOCYTES # BLD AUTO: 0.04 10*3/MM3 (ref 0–0.05)
IMM GRANULOCYTES NFR BLD AUTO: 0.8 % (ref 0–0.5)
INR PPP: 0.99 (ref 0.9–1.1)
KETONES UR QL STRIP: ABNORMAL
LEUKOCYTE ESTERASE UR QL STRIP.AUTO: NEGATIVE
LYMPHOCYTES # BLD AUTO: 1.21 10*3/MM3 (ref 0.7–3.1)
LYMPHOCYTES NFR BLD AUTO: 23 % (ref 19.6–45.3)
MCH RBC QN AUTO: 26.8 PG (ref 26.6–33)
MCHC RBC AUTO-ENTMCNC: 33 G/DL (ref 31.5–35.7)
MCV RBC AUTO: 81 FL (ref 79–97)
MONOCYTES # BLD AUTO: 0.66 10*3/MM3 (ref 0.1–0.9)
MONOCYTES NFR BLD AUTO: 12.5 % (ref 5–12)
NEUTROPHILS NFR BLD AUTO: 2.89 10*3/MM3 (ref 1.7–7)
NEUTROPHILS NFR BLD AUTO: 54.9 % (ref 42.7–76)
NITRITE UR QL STRIP: NEGATIVE
NRBC BLD AUTO-RTO: 0 /100 WBC (ref 0–0.2)
PH UR STRIP.AUTO: 5.5 [PH] (ref 4.5–8)
PLATELET # BLD AUTO: 174 10*3/MM3 (ref 140–450)
PMV BLD AUTO: 10.7 FL (ref 6–12)
POTASSIUM SERPL-SCNC: 4.3 MMOL/L (ref 3.5–5.2)
PROT SERPL-MCNC: 6.8 G/DL (ref 6–8.5)
PROT UR QL STRIP: NEGATIVE
PROTHROMBIN TIME: 13.5 SECONDS (ref 12.1–15)
RBC # BLD AUTO: 5.79 10*6/MM3 (ref 4.14–5.8)
RH BLD: POSITIVE
SODIUM SERPL-SCNC: 131 MMOL/L (ref 136–145)
SP GR UR STRIP: 1.02 (ref 1–1.03)
T&S EXPIRATION DATE: NORMAL
TROPONIN T SERPL HS-MCNC: 7 NG/L
UROBILINOGEN UR QL STRIP: ABNORMAL
WBC NRBC COR # BLD AUTO: 5.26 10*3/MM3 (ref 3.4–10.8)
WHOLE BLOOD HOLD COAG: NORMAL
WHOLE BLOOD HOLD SPECIMEN: NORMAL

## 2025-03-28 PROCEDURE — 85730 THROMBOPLASTIN TIME PARTIAL: CPT | Performed by: EMERGENCY MEDICINE

## 2025-03-28 PROCEDURE — 99285 EMERGENCY DEPT VISIT HI MDM: CPT | Performed by: EMERGENCY MEDICINE

## 2025-03-28 PROCEDURE — 70450 CT HEAD/BRAIN W/O DYE: CPT

## 2025-03-28 PROCEDURE — 70498 CT ANGIOGRAPHY NECK: CPT

## 2025-03-28 PROCEDURE — 86901 BLOOD TYPING SEROLOGIC RH(D): CPT | Performed by: EMERGENCY MEDICINE

## 2025-03-28 PROCEDURE — 85610 PROTHROMBIN TIME: CPT | Performed by: EMERGENCY MEDICINE

## 2025-03-28 PROCEDURE — 85025 COMPLETE CBC W/AUTO DIFF WBC: CPT | Performed by: EMERGENCY MEDICINE

## 2025-03-28 PROCEDURE — 80306 DRUG TEST PRSMV INSTRMNT: CPT | Performed by: HOSPITALIST

## 2025-03-28 PROCEDURE — 0042T HC CT CEREBRAL PERFUSION W/WO CONTRAST: CPT

## 2025-03-28 PROCEDURE — 81003 URINALYSIS AUTO W/O SCOPE: CPT | Performed by: EMERGENCY MEDICINE

## 2025-03-28 PROCEDURE — 86850 RBC ANTIBODY SCREEN: CPT | Performed by: EMERGENCY MEDICINE

## 2025-03-28 PROCEDURE — 70496 CT ANGIOGRAPHY HEAD: CPT

## 2025-03-28 PROCEDURE — 82948 REAGENT STRIP/BLOOD GLUCOSE: CPT

## 2025-03-28 PROCEDURE — 86900 BLOOD TYPING SEROLOGIC ABO: CPT | Performed by: EMERGENCY MEDICINE

## 2025-03-28 PROCEDURE — 84484 ASSAY OF TROPONIN QUANT: CPT | Performed by: EMERGENCY MEDICINE

## 2025-03-28 PROCEDURE — 25510000001 IOPAMIDOL PER 1 ML: Performed by: EMERGENCY MEDICINE

## 2025-03-28 PROCEDURE — 71045 X-RAY EXAM CHEST 1 VIEW: CPT

## 2025-03-28 PROCEDURE — 80053 COMPREHEN METABOLIC PANEL: CPT | Performed by: EMERGENCY MEDICINE

## 2025-03-28 PROCEDURE — 93005 ELECTROCARDIOGRAM TRACING: CPT | Performed by: EMERGENCY MEDICINE

## 2025-03-28 RX ORDER — IOPAMIDOL 755 MG/ML
50 INJECTION, SOLUTION INTRAVASCULAR
Status: COMPLETED | OUTPATIENT
Start: 2025-03-28 | End: 2025-03-28

## 2025-03-28 RX ORDER — IOPAMIDOL 755 MG/ML
50 INJECTION, SOLUTION INTRAVASCULAR
Status: DISCONTINUED | OUTPATIENT
Start: 2025-03-28 | End: 2025-03-29 | Stop reason: HOSPADM

## 2025-03-28 RX ORDER — SODIUM CHLORIDE 0.9 % (FLUSH) 0.9 %
10 SYRINGE (ML) INJECTION AS NEEDED
Status: DISCONTINUED | OUTPATIENT
Start: 2025-03-28 | End: 2025-03-29 | Stop reason: HOSPADM

## 2025-03-28 RX ADMIN — IOPAMIDOL 50 ML: 755 INJECTION, SOLUTION INTRAVENOUS at 23:33

## 2025-03-28 RX ADMIN — IOPAMIDOL 50 ML: 755 INJECTION, SOLUTION INTRAVENOUS at 23:30

## 2025-03-28 RX ADMIN — IOPAMIDOL 50 ML: 755 INJECTION, SOLUTION INTRAVENOUS at 23:24

## 2025-03-29 ENCOUNTER — APPOINTMENT (OUTPATIENT)
Dept: MRI IMAGING | Facility: HOSPITAL | Age: 50
End: 2025-03-29
Payer: COMMERCIAL

## 2025-03-29 ENCOUNTER — READMISSION MANAGEMENT (OUTPATIENT)
Dept: CALL CENTER | Facility: HOSPITAL | Age: 50
End: 2025-03-29
Payer: COMMERCIAL

## 2025-03-29 VITALS
RESPIRATION RATE: 20 BRPM | HEIGHT: 71 IN | WEIGHT: 308.6 LBS | TEMPERATURE: 97.5 F | SYSTOLIC BLOOD PRESSURE: 151 MMHG | OXYGEN SATURATION: 95 % | HEART RATE: 69 BPM | BODY MASS INDEX: 43.2 KG/M2 | DIASTOLIC BLOOD PRESSURE: 88 MMHG

## 2025-03-29 PROBLEM — R29.90 STROKE-LIKE SYMPTOMS: Status: ACTIVE | Noted: 2025-03-29

## 2025-03-29 LAB
AMPHET+METHAMPHET UR QL: NEGATIVE
AMPHETAMINES UR QL: NEGATIVE
BARBITURATES UR QL SCN: NEGATIVE
BENZODIAZ UR QL SCN: NEGATIVE
BUPRENORPHINE SERPL-MCNC: NEGATIVE NG/ML
CANNABINOIDS SERPL QL: POSITIVE
CHOLEST SERPL-MCNC: 101 MG/DL (ref 0–200)
COCAINE UR QL: NEGATIVE
GEN 5 1HR TROPONIN T REFLEX: 7 NG/L
HBA1C MFR BLD: 8.7 % (ref 4.8–5.6)
HDLC SERPL-MCNC: 22 MG/DL (ref 40–60)
LDLC SERPL CALC-MCNC: 43 MG/DL (ref 0–100)
LDLC/HDLC SERPL: 1.57 {RATIO}
METHADONE UR QL SCN: NEGATIVE
OPIATES UR QL: NEGATIVE
OXYCODONE UR QL SCN: NEGATIVE
PCP UR QL SCN: NEGATIVE
QT INTERVAL: 352 MS
QTC INTERVAL: 454 MS
TRICYCLICS UR QL SCN: POSITIVE
TRIGL SERPL-MCNC: 222 MG/DL (ref 0–150)
TROPONIN T NUMERIC DELTA: 0 NG/L
VLDLC SERPL-MCNC: 36 MG/DL (ref 5–40)

## 2025-03-29 PROCEDURE — 96374 THER/PROPH/DIAG INJ IV PUSH: CPT

## 2025-03-29 PROCEDURE — 83036 HEMOGLOBIN GLYCOSYLATED A1C: CPT | Performed by: FAMILY MEDICINE

## 2025-03-29 PROCEDURE — G0378 HOSPITAL OBSERVATION PER HR: HCPCS

## 2025-03-29 PROCEDURE — 99204 OFFICE O/P NEW MOD 45 MIN: CPT | Performed by: PSYCHIATRY & NEUROLOGY

## 2025-03-29 PROCEDURE — 63710000001 INSULIN LISPRO (HUMAN) PER 5 UNITS: Performed by: FAMILY MEDICINE

## 2025-03-29 PROCEDURE — 99235 HOSP IP/OBS SAME DATE MOD 70: CPT | Performed by: FAMILY MEDICINE

## 2025-03-29 PROCEDURE — 70551 MRI BRAIN STEM W/O DYE: CPT

## 2025-03-29 PROCEDURE — 25010000002 DIAZEPAM PER 5 MG: Performed by: HOSPITALIST

## 2025-03-29 PROCEDURE — 80061 LIPID PANEL: CPT | Performed by: FAMILY MEDICINE

## 2025-03-29 PROCEDURE — 92523 SPEECH SOUND LANG COMPREHEN: CPT

## 2025-03-29 PROCEDURE — 84484 ASSAY OF TROPONIN QUANT: CPT | Performed by: EMERGENCY MEDICINE

## 2025-03-29 RX ORDER — LISINOPRIL 20 MG/1
20 TABLET ORAL DAILY
Status: DISCONTINUED | OUTPATIENT
Start: 2025-03-29 | End: 2025-03-29 | Stop reason: HOSPADM

## 2025-03-29 RX ORDER — METOPROLOL SUCCINATE 50 MG/1
50 TABLET, EXTENDED RELEASE ORAL 2 TIMES DAILY
Status: DISCONTINUED | OUTPATIENT
Start: 2025-03-29 | End: 2025-03-29 | Stop reason: HOSPADM

## 2025-03-29 RX ORDER — CYCLOBENZAPRINE HCL 10 MG
10 TABLET ORAL 3 TIMES DAILY PRN
Status: DISCONTINUED | OUTPATIENT
Start: 2025-03-29 | End: 2025-03-29 | Stop reason: HOSPADM

## 2025-03-29 RX ORDER — SOTALOL HYDROCHLORIDE 80 MG/1
120 TABLET ORAL EVERY 12 HOURS SCHEDULED
Status: DISCONTINUED | OUTPATIENT
Start: 2025-03-29 | End: 2025-03-29 | Stop reason: HOSPADM

## 2025-03-29 RX ORDER — NICOTINE POLACRILEX 4 MG
15 LOZENGE BUCCAL
Status: DISCONTINUED | OUTPATIENT
Start: 2025-03-29 | End: 2025-03-29 | Stop reason: HOSPADM

## 2025-03-29 RX ORDER — IBUPROFEN 600 MG/1
1 TABLET ORAL
Status: DISCONTINUED | OUTPATIENT
Start: 2025-03-29 | End: 2025-03-29 | Stop reason: HOSPADM

## 2025-03-29 RX ORDER — SODIUM CHLORIDE 0.9 % (FLUSH) 0.9 %
10 SYRINGE (ML) INJECTION EVERY 12 HOURS SCHEDULED
Status: DISCONTINUED | OUTPATIENT
Start: 2025-03-29 | End: 2025-03-29 | Stop reason: HOSPADM

## 2025-03-29 RX ORDER — SODIUM CHLORIDE 9 MG/ML
40 INJECTION, SOLUTION INTRAVENOUS AS NEEDED
Status: DISCONTINUED | OUTPATIENT
Start: 2025-03-29 | End: 2025-03-29 | Stop reason: HOSPADM

## 2025-03-29 RX ORDER — INSULIN LISPRO 100 [IU]/ML
2-9 INJECTION, SOLUTION INTRAVENOUS; SUBCUTANEOUS
Status: DISCONTINUED | OUTPATIENT
Start: 2025-03-29 | End: 2025-03-29 | Stop reason: HOSPADM

## 2025-03-29 RX ORDER — ATORVASTATIN CALCIUM 40 MG/1
40 TABLET, FILM COATED ORAL DAILY
Status: DISCONTINUED | OUTPATIENT
Start: 2025-03-29 | End: 2025-03-29 | Stop reason: HOSPADM

## 2025-03-29 RX ORDER — GLIPIZIDE 10 MG/1
10 TABLET ORAL
Status: DISCONTINUED | OUTPATIENT
Start: 2025-03-29 | End: 2025-03-29 | Stop reason: HOSPADM

## 2025-03-29 RX ORDER — SODIUM CHLORIDE 0.9 % (FLUSH) 0.9 %
10 SYRINGE (ML) INJECTION AS NEEDED
Status: DISCONTINUED | OUTPATIENT
Start: 2025-03-29 | End: 2025-03-29 | Stop reason: HOSPADM

## 2025-03-29 RX ORDER — DIAZEPAM 10 MG/2ML
2.5 INJECTION, SOLUTION INTRAMUSCULAR; INTRAVENOUS ONCE
Status: COMPLETED | OUTPATIENT
Start: 2025-03-29 | End: 2025-03-29

## 2025-03-29 RX ORDER — CITALOPRAM HYDROBROMIDE 20 MG/1
40 TABLET ORAL DAILY
Status: DISCONTINUED | OUTPATIENT
Start: 2025-03-29 | End: 2025-03-29 | Stop reason: HOSPADM

## 2025-03-29 RX ORDER — CELECOXIB 100 MG/1
200 CAPSULE ORAL 2 TIMES DAILY
Status: DISCONTINUED | OUTPATIENT
Start: 2025-03-29 | End: 2025-03-29 | Stop reason: HOSPADM

## 2025-03-29 RX ORDER — DEXTROSE MONOHYDRATE 25 G/50ML
25 INJECTION, SOLUTION INTRAVENOUS
Status: DISCONTINUED | OUTPATIENT
Start: 2025-03-29 | End: 2025-03-29 | Stop reason: HOSPADM

## 2025-03-29 RX ADMIN — CITALOPRAM HYDROBROMIDE 40 MG: 20 TABLET ORAL at 08:29

## 2025-03-29 RX ADMIN — Medication 10 ML: at 08:28

## 2025-03-29 RX ADMIN — INSULIN LISPRO 2 UNITS: 100 INJECTION, SOLUTION INTRAVENOUS; SUBCUTANEOUS at 08:29

## 2025-03-29 RX ADMIN — APIXABAN 5 MG: 2.5 TABLET, FILM COATED ORAL at 07:24

## 2025-03-29 RX ADMIN — ATORVASTATIN CALCIUM 40 MG: 40 TABLET, FILM COATED ORAL at 08:29

## 2025-03-29 RX ADMIN — DIAZEPAM 2.5 MG: 5 INJECTION, SOLUTION INTRAMUSCULAR; INTRAVENOUS at 09:15

## 2025-03-29 RX ADMIN — SOTALOL HYDROCHLORIDE 120 MG: 80 TABLET ORAL at 08:29

## 2025-03-29 RX ADMIN — GLIPIZIDE 10 MG: 10 TABLET ORAL at 08:29

## 2025-03-29 RX ADMIN — Medication 10 ML: at 02:23

## 2025-03-29 RX ADMIN — METOPROLOL SUCCINATE 50 MG: 50 TABLET, EXTENDED RELEASE ORAL at 08:29

## 2025-03-29 RX ADMIN — LISINOPRIL 20 MG: 20 TABLET ORAL at 08:29

## 2025-03-29 RX ADMIN — CELECOXIB 200 MG: 100 CAPSULE ORAL at 08:29

## 2025-03-29 NOTE — PLAN OF CARE
Goal Outcome Evaluation:  Plan of Care Reviewed With: patient, spouse        Progress: no change  Outcome Evaluation: Admitted with dx of possible stroke symptoms. Ox4. NIH 0. Snoring while sleeping. No complaints. Scheduled for MRI today. Souse at bedside.                              Detail Level: Detailed Quality 130: Documentation Of Current Medications In The Medical Record: Current Medications Documented Quality 402: Tobacco Use And Help With Quitting Among Adolescents: Patient screened for tobacco and never smoked

## 2025-03-29 NOTE — CONSULTS
DOS: 3/29/2025  NAME: Santiago Zamora   : 1975  PCP: Quinn Stoner DO  CC: New onset syncopal episode  Referring MD: Luis Carlos Combs MD    Neurological Problem and Interval History:  50 y.o. right-handed white male with a Hx of chronic atrial fibrillation currently on Eliquis had been sick over the last week with upper respiratory tract infection and has been taking a lot of NyQuil.  This medication contains dextromethorphan and pseudoephedrine which can irritate the heart and can cause subtle arrhythmias.  Yesterday when he stood up he felt lightheaded and off balance and felt like a hazy vision and for that reason he came to the emergency room early in the morning.  He was seen by the hospitalist Dr. Soy Lechuga, please review his history and physical for all the details.  The patient's wife who is at the bedside tells me that he never had any seizure-like episode with this condition including jerking spells or rolling back of the eyes or foaming and frothing of the mouth or tongue bite or loss of bowel and bladder control.  He never turned blue.  He had a history of migraines in the past but did not have one yesterday.  He is on numerous medications including atorvastatin 40 mg daily as well as Eliquis 5 mg twice daily for his chronic atrial fibrillation.  He is currently feeling better.  No facial asymmetry or speech impediment or visual impairment noted.  He is awake and alert and oriented x 4 and he is able to follow one-step, two-step and three-step commands well.  There is no weakness noticeable in the upper and lower extremities and no sensory loss noted.  No finger-to-nose dysmetria or heel-to-shin dysmetria noted and he was able to get out of the bed independently and his Romberg is negative.  He is also ambulating independently.  Orthostatic blood pressure checks were performed at the bedside that showed 131/84 with a pulse of 74 in the supine position and 133/82 with a pulse of 93 in the upright  position.    Past Medical/Surgical Hx:  Past Medical History:   Diagnosis Date    Anxiety     Cardiomyopathy     Depression     Diabetes mellitus     Elevated cholesterol     Hyperlipidemia     Hypertension     Varicose veins      Past Surgical History:   Procedure Laterality Date    CARDIAC CATHETERIZATION      CARDIAC CATHETERIZATION N/A 5/2/2022    Procedure: Coronary angiography;  Surgeon: Stanley Ramirez MD;  Location:  [facility];  Service: Cardiovascular;  Laterality: N/A;    CARDIAC CATHETERIZATION N/A 5/2/2022    Procedure: Left Heart Cath;  Surgeon: Stanley Ramirez MD;  Location:  [facility];  Service: Cardiovascular;  Laterality: N/A;    CARDIAC CATHETERIZATION N/A 5/2/2022    Procedure: Left ventriculography;  Surgeon: Stanley Ramirez MD;  Location:  [facility];  Service: Cardiovascular;  Laterality: N/A;    CHOLECYSTECTOMY         Review of Systems:    Constitutional: Morbidly obese gentleman with a body mass index of 43 kg/m² currently resting comfortably at the bedside.  Cardiovascular: No chest pain or palpitations noted.  Respiratory: No shortness of breath noted.  Gastrointestinal: No nausea and vomiting noted.  Genitourinary: No bladder incontinence noted.  Musculoskeletal: No aches and pains in the muscles or joints noted.  Dermatological: No skin breakdown noted.  Neurological: No focal neurological deficits with NIH stroke scale of 0.  Psychiatric: No major anxiety or depression noted.  Ophthalmological: No visual changes noted.          Medications On Admission  Medications Prior to Admission   Medication Sig Dispense Refill Last Dose/Taking    apixaban (Eliquis) 5 MG tablet tablet Take 1 tablet by mouth Every 12 (Twelve) Hours. **Patient needs appointment with PCP for future refills** 60 tablet 0 3/28/2025 Evening    atorvastatin (LIPITOR) 40 MG tablet Take 1 tablet by mouth once daily 90 tablet 0 3/28/2025    celecoxib (CeleBREX) 200  MG capsule Take 1 capsule by mouth 2 (Two) Times a Day. 180 capsule 1 3/28/2025 Evening    citalopram (CeleXA) 40 MG tablet Take 1 tablet by mouth Daily. 90 tablet 1 3/28/2025 Morning    glimepiride (AMARYL) 4 MG tablet TAKE 1 TABLET BY MOUTH IN THE MORNING BEFORE BREAKFAST 90 tablet 0 3/28/2025    lisinopril (PRINIVIL,ZESTRIL) 20 MG tablet Take 1 tablet by mouth Daily. 90 tablet 1 3/28/2025    metFORMIN ER (GLUCOPHAGE-XR) 500 MG 24 hr tablet Take 1 tablet by mouth Daily With Breakfast. 90 tablet 1 3/28/2025    metoprolol succinate XL (TOPROL-XL) 100 MG 24 hr tablet Take 0.5 tablets by mouth 2 (Two) Times a Day. 180 tablet 1 3/28/2025    Mounjaro 5 MG/0.5ML solution auto-injector INJECT 1 SYRINGE SUBCUTANEOUSLY ONCE A WEEK 4 mL 0 3/21/2025    sotalol (BETAPACE) 120 MG tablet Take 1 tablet by mouth Every 12 (Twelve) Hours. 180 tablet 3 3/28/2025 Evening    tadalafil (CIALIS) 5 MG tablet Take 1 tablet by mouth Daily As Needed for Erectile Dysfunction. 30 tablet 5 3/28/2025 Evening    Blood Glucose Monitoring Suppl (D-Care Glucometer) w/Device kit Use 1 Units Daily. 1 kit 0 Unknown    cyclobenzaprine (FLEXERIL) 10 MG tablet Take 1 tablet by mouth 3 (Three) Times a Day As Needed for Muscle Spasms. for muscle spams 90 tablet 5 Unknown    glucose blood test strip Use as instructed 100 each 12        Prior to Admission medications    Medication Sig Start Date End Date Taking? Authorizing Provider   apixaban (Eliquis) 5 MG tablet tablet Take 1 tablet by mouth Every 12 (Twelve) Hours. **Patient needs appointment with PCP for future refills** 3/25/25  Yes Quinn Stoner DO   atorvastatin (LIPITOR) 40 MG tablet Take 1 tablet by mouth once daily 2/20/25  Yes Quinn Stoner DO   celecoxib (CeleBREX) 200 MG capsule Take 1 capsule by mouth 2 (Two) Times a Day. 1/9/25  Yes Quinn Stoner DO   citalopram (CeleXA) 40 MG tablet Take 1 tablet by mouth Daily. 2/11/25  Yes Quinn Stoner,    glimepiride (AMARYL) 4 MG tablet TAKE  1 TABLET BY MOUTH IN THE MORNING BEFORE BREAKFAST 3/18/25  Yes Quinn Stoner DO   lisinopril (PRINIVIL,ZESTRIL) 20 MG tablet Take 1 tablet by mouth Daily. 12/5/24  Yes Quinn Stoner DO   metFORMIN ER (GLUCOPHAGE-XR) 500 MG 24 hr tablet Take 1 tablet by mouth Daily With Breakfast. 2/3/25  Yes Quinn Stoner DO   metoprolol succinate XL (TOPROL-XL) 100 MG 24 hr tablet Take 0.5 tablets by mouth 2 (Two) Times a Day. 12/5/24  Yes Quinn Stoner DO   Mounjaro 5 MG/0.5ML solution auto-injector INJECT 1 SYRINGE SUBCUTANEOUSLY ONCE A WEEK 3/3/25  Yes Quinn Stoner DO   sotalol (BETAPACE) 120 MG tablet Take 1 tablet by mouth Every 12 (Twelve) Hours. 12/27/24  Yes Epi Sanders MD   tadalafil (CIALIS) 5 MG tablet Take 1 tablet by mouth Daily As Needed for Erectile Dysfunction. 9/17/24  Yes Quinn Stoner DO   Blood Glucose Monitoring Suppl (D-Care Glucometer) w/Device kit Use 1 Units Daily. 12/9/24   Quinn Stoner DO   cyclobenzaprine (FLEXERIL) 10 MG tablet Take 1 tablet by mouth 3 (Three) Times a Day As Needed for Muscle Spasms. for muscle spams 12/5/24   Quinn Stoner DO   glucose blood test strip Use as instructed 12/9/24   Quinn Stoner DO        Allergies:  Allergies   Allergen Reactions    Egg-Derived Products Swelling    Fish-Derived Products Swelling       Social Hx:  Social History     Socioeconomic History    Marital status:    Tobacco Use    Smoking status: Never    Smokeless tobacco: Never    Tobacco comments:     caffeine use- denies   Vaping Use    Vaping status: Never Used   Substance and Sexual Activity    Alcohol use: No    Drug use: No    Sexual activity: Defer       Family Hx:  Family History   Problem Relation Age of Onset    Hypertension Mother     Hypertension Father     Atrial fibrillation Father     Heart attack Father     Diabetes Other        Review of Imaging (Interpretation of images not reports):   Narrative & Impression   CT ANGIOGRAM HEAD W AI ANALYSIS OF LVO, CT  ANGIOGRAM NECK     Date of Exam: 3/28/2025 11:17 PM EDT     Indication: Neuro Deficit, acute, Stroke suspected  Neuro deficit, acute stroke suspected.     Comparison: None available.     Technique: CTA of the head was performed after the uneventful intravenous administration of iodinated contrast. Reconstructed coronal and sagittal images were also obtained. In addition, a 3-D volume rendered image was created for interpretation.   Automated exposure control and iterative reconstruction methods were used.        Findings:  Aortic arch: The aortic arch is unremarkable.  There is conventional 3 vessel arch anatomy.  The right brachiocephalic and visualized bilateral subclavian arteries are within normal limits.     Right carotid: The right CCA arises as expected from the brachiocephalic trunk.  The CCA follows a normal course and appears normal caliber.  The carotid bifurcation is unremarkable.  The external carotid artery and distal branches appear within normal   limits.  The cervical internal carotid artery follows a normal course and appears normal caliber throughout the neck and into the head.  The intracranial ICA segments appear within normal limits.  The ophthalmic artery origin is normal.  The A1 and M1   segments appear within normal limits.  The visualized distal ANGELA and MCA branches appear patent.  There is  a patent  anterior communicating artery. There is a patent  posterior communicating artery.     Left carotid: The left CCA arises as expected from the aortic arch.   The CCA follows a normal course and appears normal caliber.  The carotid bifurcation is unremarkable.  The external carotid artery and distal branches appear within normal limits.  The   cervical internal carotid artery follows a normal course and appears normal caliber throughout the neck and into the head.  The intracranial ICA segments appear within normal limits.  The ophthalmic artery origin is normal.  The A1 and M1 segments   appear  within normal limits.  The visualized distal ANGELA and MCA branches appear patent.  There is a patent  posterior communicating artery.     Posterior circulation: Vertebral arteries arise as expected from ipsilateral subclavian arteries. The left vertebral artery is dominant. The vertebral arteries follow a normal course and appear normal caliber throughout the neck and into the head.  The   V4 segments are patent.  Visualized posterior inferior cerebellar arteries are within normal limits.  The basilar artery is normal caliber.  Superior cerebellar arteries are patent.  Bilateral P1 segments and posterior cerebral arteries appear within   normal limits.        Nonvascular findings: Intracranial structures appear unremarkable.  Orbits are within normal limits.  Paranasal sinuses and mastoid air cells appear well aerated.  Superficial soft tissues and underlying musculature appear within normal limits.  The lung   apices are clear.  The thyroid and salivary glands appear unremarkable.  The suprahyoid and infrahyoid spaces of the neck appear unremarkable.  There is no evidence of cervical lymphadenopathy or a neck mass.  There are no acute osseous abnormalities or   destructive bone lesions.     IMPRESSION:  Impression:  No significant vascular abnormality in the head or the neck.       CT Head Without Contrast Stroke Protocol  Result Date: 3/28/2025  CT HEAD WO CONTRAST STROKE PROTOCOL Date of Exam: 3/28/2025 10:38 PM EDT Indication: Neuro deficit, acute, stroke suspected Neuro Deficit, acute, Stroke suspected. Comparison: None available. Technique: Axial CT images were obtained of the head without contrast administration.  Reconstructed coronal and sagittal images were also obtained. Automated exposure control and iterative construction methods were used. Findings: No focal brain lesion, mass or intracranial hemorrhage is seen. The ventricles are normal in size and configuration. An air-fluid level is noted in the left  maxillary sinus. Mucoperiosteal thickening is noted elsewhere in the paranasal sinuses bilaterally. No focal calvarial abnormality is seen.     Impression: Impression: No acute intracranial abnormality. Left maxillary sinus air-fluid level. Correlate clinically for acute sinusitis. Electronically Signed: Elier Licea MD  3/28/2025 10:54 PM EDT  Workstation ID: [id number]            MRI Brain Without Contrast  Result Date: 3/29/2025  MRI BRAIN WO CONTRAST Date of Exam: 3/29/2025 9:12 AM EDT Indication: word findings difficulties.  Comparison: None available. Technique:  Routine multiplanar/multisequence sequence images of the brain were obtained without contrast administration. FINDINGS: No abnormal diffusion restriction is observed to indicate acute or subacute focal ischemia. Minimal white matter signal changes are noted likely related to early age-related changes. There is no evidence for abnormal cerebral edema. There is no mass effect or midline shift. The ventricular system is nondilated. The basilar cisterns are patent. The midline structures are unremarkable. No significant extra-axial fluid collections are identified. Changes of chronic sinusitis are noted. There is an air-fluid level in the left maxillary sinus suggesting acute sinusitis.     Impression: 1.No evidence for acute or subacute focal ischemia. 2.Minimal white matter signal changes are noted likely age-related changes. 3.Evidence for acute on chronic sinusitis. Electronically Signed: Alex Cline MD  3/29/2025 10:07 AM EDT  Workstation ID: [id number]       Additional Tests Performed: CT CEREBRAL PERFUSION W WO CONTRAST     Date of Exam: 3/28/2025 11:07 PM EDT     Indication: Neuro deficit, acute, stroke suspected  Neuro Deficit, acute, Stroke suspected.     Comparison: Noncontrast CT scan of the head obtained earlier same day     Technique: Axial CT images of the brain were obtained prior to and after the administration of iodinated contrast. CT  Perfusion protocol was utilized. Automated post processing was performed by RAPID software and submitted to PACS for interpretation.   Automated exposure control and iterative reconstruction was utilized.        Findings:  Cerebral blood flow, blood volume and mean transit time maps are symmetric without large perfusion defect.     CBF<30% volume: 0 mL  Tmax>6sec volume: 0 mL  Mismatch volume: 0 mL  Mismatch ratio: None.     IMPRESSION:  Impression:  No significant perfusion abnormality in the brain.    Results for orders placed during the hospital encounter of 04/07/22    Adult Transthoracic Echo Complete w/ Color, Spectral and Contrast if necessary per protocol    Interpretation Summary  · Left ventricular wall thickness is consistent with mild concentric hypertrophy.  · Calculated left ventricular EF = 57.8% Estimated left ventricular EF was in agreement with the calculated left ventricular EF. Left ventricular systolic function is normal.  · Estimated right ventricular systolic pressure from tricuspid regurgitation is normal (<35 mmHg).  · Left ventricular diastolic function was normal.            Laboratory Results:   Lab Results   Component Value Date    GLUCOSE 376 (H) 03/28/2025    CALCIUM 9.4 03/28/2025     (L) 03/28/2025    K 4.3 03/28/2025    CO2 26.4 03/28/2025    CL 94 (L) 03/28/2025    BUN 14 03/28/2025    CREATININE 0.99 03/28/2025    EGFRIFAFRI 124 02/02/2022    EGFRIFNONA 107 02/02/2022    BCR 14.1 03/28/2025    ANIONGAP 10.6 03/28/2025     Lab Results   Component Value Date    WBC 5.26 03/28/2025    HGB 15.5 03/28/2025    HCT 46.9 03/28/2025    MCV 81.0 03/28/2025     03/28/2025     Lab Results   Component Value Date    CHOL 101 03/29/2025     Lab Results   Component Value Date    HDL 22 (L) 03/29/2025    HDL 27 (L) 11/26/2024    HDL 31 (L) 07/26/2023     Lab Results   Component Value Date    LDL 43 03/29/2025    LDL 66 11/26/2024    LDL 65 07/26/2023     Lab Results   Component Value  "Date    TRIG 222 (H) 03/29/2025    TRIG 320 (H) 11/26/2024    TRIG 236 (H) 07/26/2023     Lab Results   Component Value Date    HGBA1C 8.70 (H) 03/29/2025     Lab Results   Component Value Date    INR 0.99 03/28/2025    INR 0.90 04/07/2022    PROTIME 13.5 03/28/2025    PROTIME 12.3 04/07/2022       TSH          11/26/2024    10:42   TSH   TSH 0.881           Physical Examination:  /82 (BP Location: Left arm, Patient Position: Standing)   Pulse 93   Temp 97.5 °F (36.4 °C) (Oral)   Resp 18   Ht 180.3 cm (71\")   Wt (!) 140 kg (308 lb 9.6 oz)   SpO2 95%   BMI 43.04 kg/m²   General Appearance:   Well developed, well nourished, well groomed, alert, and cooperative.  HEENT: Normocephalic.    Neck and Spine: Normal range of motion.  Normal alignment. No mass or tenderness. No bruits.    Extremities:    No edema or deformities. Normal joint ROM.   Skin:    No rashes or birth marks.    Neurological examination:  Higher Integrative  Function: Oriented to time, place and person. Normal registration, recall, attention span and concentration. Normal language including comprehension, spontaneous speech, repetition, reading, writing, naming and vocabulary. No neglect with normal visual-spatial function and construction. Normal fund of knowledge and higher integrative function.  CN II:  Pupils are equal, round, and reactive to light. Normal visual acuity and visual fields.    CN III IV VI: Extraocular movements are full without nystagmus.   CN V:  Normal facial sensation and strength of muscles of mastication.  CN VII:  Facial movements are symmetric. No weakness.  CN VIII: Auditory acuity is normal.  CN IX & X: Symmetric palatal movement.  CN XI:  Sternocleidomastoid and trapezius are normal.  No weakness.  CN XII:  The tongue is midline.  No atrophy or fasciculations.  Motor:  Normal muscle strength, bulk and tone in upper and lower extremities.  No fasciculations, rigidity, spasticity, or abnormal " movements.  Sensation: Normal to light touch, pinprick, vibration, temperature, and proprioception in arms and legs. Normal graphesthesia and no extinction on DSS.  Station and Gait: Normal gait and station.  The Romberg is negative.  Coordination:  Finger to nose test shows no dysmetria.  Rapid alternating movements are normal.  Heel to shin normal.    NIHSS:    Baseline  0-->Alert: keenly responsive  0-->Answers both questions correctly  0-->Performs both tasks correctly  0=normal  0=No visual loss  0=Normal symmetric movement  0-->No drift: limb holds 90 (or 45) degrees for full 10 secs  0-->No drift: limb holds 90 (or 45) degrees for full 10 secs  0-->No drift: limb holds 90 (or 45) degrees for full 10 secs  0-->No drift: limb holds 90 (or 45) degrees for full 10 secs  0=Absent  0=Normal; no sensory loss  0=No aphasia, normal  0=Normal  0=No abnormality    Total score: 0    Diagnoses / Discussion:  50 y.o. who presents with Sx of syncopal episode which is not related to any seizure activity or orthostatic hypotension.  Might have been triggered by the recent cold for which she had been taking a lot of DayQuil that contains dextromethorphan/pseudoephedrine which can cause subtle cardiac arrhythmias.    Plan:  Continue the Eliquis 5 mg twice daily with food as before.  Hydrate  Neuro checks  Lipitor 40 mg as before as the LDL is less than 70.  Non-pharmacological DVT prophylaxis  TTE with bubble study to look for PFO, if it is not available today this can be arranged as an outpatient.  Telemetry Unit admission/observation to look for cardiac arrhythmias  PT/OT/ST  Blood pressure control : Keep the systolic blood pressure between 1 20-1 40 and the diastolic between 70-80.  Goal LDL <70-recommend high dose statins-   Serum glucose < 140  Body mass index: 43 kg/m² which puts him in the range of morbid obesity and he is at a very high risk of obstructive sleep apnea  Obstructive sleep apnea screening with STOP-BANG  questionnaire.     Discussed signs and symptoms of stroke and when to call 911. Instructed to follow a low fat diet including the Mediterranean diet. Instructed to take all medications daily as prescribed. Encouraged 30 minutes of exercise 3-4 times a week as tolerated. Stay well hydrated. Discussed potential side effects of new medications and signs and symptoms to report.  Reviewed stroke risk factors and stroke prevention plan. Patient and family verbalizes understanding and agrees with plan.     I have discussed the above with the patient and family.  If medically stable he can be discharged home and followed up as an outpatient with Dr. Liu Lopez in 4 to 6 weeks time.  Time spent with patient: 70 minutes in face-to-face evaluation and management of the patient using the dedicated telemedicine device without any interruption with the help of the rounding nurse with the patient located at the [facility] and myself at a remote location.    Electronically signed by Emerson Ventura MD, 03/29/25, 11:10 AM EDT.    Dictated using Dragon dictation.

## 2025-03-29 NOTE — THERAPY DISCHARGE NOTE
Acute Care - Speech Language Pathology Initial Evaluation/Discharge   [facility]     Patient Name: Santiago Zamora  : 1975  MRN: [id number]  Today's Date: 3/29/2025               Admit Date: 3/28/2025     Visit Dx:    ICD-10-CM ICD-9-CM   1. Stroke-like symptoms  R29.90 781.99   2. Word finding difficulty  R47.89 V40.1     Patient Active Problem List   Diagnosis    Benign essential HTN    Cervical nerve root disorder    CCF (congestive cardiac failure)    Mixed hyperlipidemia    Vitamin D deficiency    Depression with anxiety    Type 2 diabetes mellitus with hyperglycemia, without long-term current use of insulin    Obesity (BMI 30-39.9)    Paroxysmal atrial fibrillation    Ventricular tachycardia    Other male erectile dysfunction    Stroke-like symptoms     Past Medical History:   Diagnosis Date    Anxiety     Cardiomyopathy     Depression     Diabetes mellitus     Elevated cholesterol     Hyperlipidemia     Hypertension     Varicose veins      Past Surgical History:   Procedure Laterality Date    CARDIAC CATHETERIZATION      CARDIAC CATHETERIZATION N/A 2022    Procedure: Coronary angiography;  Surgeon: Stanley Ramirez MD;  Location: [facility] INVASIVE LOCATION;  Service: Cardiovascular;  Laterality: N/A;    CARDIAC CATHETERIZATION N/A 2022    Procedure: Left Heart Cath;  Surgeon: Stanley Ramirez MD;  Location: [facility] CATH INVASIVE LOCATION;  Service: Cardiovascular;  Laterality: N/A;    CARDIAC CATHETERIZATION N/A 2022    Procedure: Left ventriculography;  Surgeon: Stanley Ramirez MD;  Location: [facility] CATH INVASIVE LOCATION;  Service: Cardiovascular;  Laterality: N/A;    CHOLECYSTECTOMY         SLP Recommendation and Plan  SLP Diagnosis: functional speech/language skills, functional cognitive-linguistic skills (25 0752)  SLP Diagnosis Comments: All symptoms have resolved per pt. Normal auditory comprehension and verbal expression noted during conversational speech. No motor speech  "deficits and no gross cognitive deficits. (03/29/25 0752)     [facility] Criteria for Skilled Therapy Interventions Met: no problems identified which require skilled intervention (03/29/25 0752)  Anticipated Discharge Disposition (SLP): No further SLP services warranted (03/29/25 0752)     Therapy Frequency (SLP SLC): evaluation only (03/29/25 0752)           Patient/Family Concerns, Anticipated Discharge Disposition (SLP): No concerns reported per pt at this time. No family currently present. (03/29/25 0752)                         Outcome Evaluation: SLP: Communication Evaluaton completed. Pt with functional understanding and verbal expression. No gross deficits noted. Able to converse at the conversational level. Speech is precise at the conversational level. Able to name, repeat and follow 1, 2, 3 step commands. No facial asymmetry or weakness noted. No further SLP indicated at this time as functional performance and all symptoms have resolved. (03/29/25 0820)    SLP EVALUATION (Last 72 Hours)       SLP SLC Evaluation       Row Name 03/29/25 0752                   Communication Assessment/Intervention    Document Type discharge evaluation/summary  -AD        Subjective Information no complaints  -AD        Patient Observations alert;cooperative  -AD        Patient/Family/Caregiver Comments/Observations Pt seen at bedside and agreeable to evaluation. Reports all problems have resolved.  -AD        Patient Effort good  -AD        Symptoms Noted During/After Treatment none  -AD           General Information    Patient Profile Reviewed yes  -AD        Pertinent History Of Current Problem Hx per Dr. Lechuga's H and P as follows: '51 y/o male with hx of CM, PAF on Eliquis, HTN, DM II, and hyperlipidemia, presents to [facility] for word findings difficulties.  Patient was lying in bed around 10:20 last night when he called out to his wife saying \"my mouth, my mouth.\"  Patient says his mouth felt dry, he wasn't " able to get his words out.  He also felt like  his brain wasn't communicating with his body.  He is on Eliquis and took his dose around 7 PM last night and hasn't missed any doses.  Upon arrival to the ED, he still felt that he was having some trouble finding his words and he couldn't get himself out of the wheelchair.  He denies any blurry vision, headache, or unilateral weakness or numbness.  He says over the last week he has been dealing with sinus congestion and his head has felt stopped up.  He has felt warm at times, but is not sure if he was having fevers.  In the ED, code stroke was called. He underwent CT head, CTA head and neck, and CTP all showing no acute findings.  Currently, he feels back to normal.  He is now able to find his words and feels like his brain is back communicating with his body.' Blood sugar in ED was 346. All CT imaging has been negative for an acute infarct at this time. MRI pending. Pt lives at home with his wife and is independent with all care. NIH has been 1 for language and then 1 for alertness. Currently at 7 am it is 0.Communication evaluation ordered per stroke protocol. Passed RN Stroke Dysphagia Screen and no swallowing concerns since onset of symptoms.  -AD        Precautions/Limitations, Vision corrective lenses needed for reading;other (see comments)  Pt reports needs glasses to see board in room and phone. Can read large numbers on clock.  -AD        Precautions/Limitations, Hearing WFL  -AD        Prior Level of Function-Communication WFL;other (see comments)  per pt report  -AD        Plans/Goals Discussed with patient;agreed upon  -AD        Barriers to Rehab none identified  -AD        Patient's Goals for Discharge return to home  -AD           Pain    Pre/Posttreatment Pain Comment No pain currently reported or indicated.  -AD           Comprehension Assessment/Intervention    Comprehension Assessment/Intervention Auditory Comprehension;Reading Comprehension  -AD            Auditory Comprehension Assessment/Intervention    Auditory Comprehension (Communication) WFL  -AD        Able to Identify Objects/Pictures (Communication) body part;familiar objects;WFL  -AD        Answers Questions (Communication) yes/no;wh questions;personal;simple;concrete;WFL  -AD        Able to Follow Commands (Communication) 1-step;2-step;3-step;WFL  -AD        Auditory Comprehension Communication, Comment Functional auditory comprehension at the conversational level. No gross deficits identified.  -AD           Reading Comprehension Assessment/Intervention    Reading Comprehension, Comment Able to read numbers on the clock, but reports can't see smaller print now due to not having his glasses with him.  -AD           Expression Assessment/Intervention    Expression Assessment/Intervention verbal expression  -AD           Verbal Expression Assessment/Intervention    Verbal Expression WFL  -AD        Automatic Speech (Communication) response to greeting;WFL  -AD        Repetition words;phrases;sentences;WFL  Slow to initiate multisyllabic words, but able to precisely.  -AD        Confrontational Naming high frequency;low frequency;WFL  -AD        Spontaneous/Functional Words simple;complex;WFL  -AD        Conversational Discourse/Fluency WFL  -AD        Verbal Expression, Comment Verbal Expression is WFL w/no gross deficits.  -AD           Oral Motor Structure and Function    Oral Motor Structure and Function WFL  -AD        Oral Lesions or Structural Abnormalities and/or variants none  -AD        Dentition Assessment natural, present and adequate  -AD        Mucosal Quality moist, healthy  -AD           Oral Musculature and Cranial Nerve Assessment    Oral Motor General Assessment WFL  -AD        Oral Motor, Comment No asymmetry. Strength and ROM WFL.  -AD           Motor Speech Assessment/Intervention    Motor Speech Function WFL  -AD        Conversational Speech (Communication) WFL  -AD        Speech  intelligibility 100%;in quiet environment;in connected speech;with unfamiliar listener  -AD        Motor Speech, Comment Precise speech at the conversational level. Rapid production of sounds and rapid alternating sounds WFL.  -AD           Cursory Voice Assessment/Intervention    Quality and Resonance (Voice) WFL  -AD           Cognitive Assessment Intervention- SLP    Cognitive Function (Cognition) WFL  -AD        Orientation Status (Cognition) WFL  -AD        Pragmatics (Communication) WFL  -AD        Cognition, Comment No gross deficits of cognition noted or reported. No further evaluation in this area indicated at this time.  -AD           SLP Evaluation Clinical Impressions    SLP Diagnosis functional speech/language skills;functional cognitive-linguistic skills  -AD        SLP Diagnosis Comments All symptoms have resolved per pt. Normal auditory comprehension and verbal expression noted during conversational speech. No motor speech deficits and no gross cognitive deficits.  -AD        Rehab Potential/Prognosis good  -AD        SLC Criteria for Skilled Therapy Interventions Met no problems identified which require skilled intervention  -AD        Functional Impact no impact on function  -AD           Recommendations    Therapy Frequency (SLP SLC) evaluation only  -AD        Anticipated Discharge Disposition (SLP) No further SLP services warranted  -AD        Patient/Family Concerns, Anticipated Discharge Disposition (SLP) No concerns reported per pt at this time. No family currently present.  -AD                  User Key  (r) = Recorded By, (t) = Taken By, (c) = Cosigned By      Initials Name Effective Dates    Diana Godfrey MS CCC-SLP 06/16/21 -                        EDUCATION  The patient has been educated in the following areas:   Communication Impairment. Pt verbalizes understanding of normal results and no further evaluation or therapy indicated.         Time Calculation:    Time Calculation-  SLP       Row Name 03/29/25 0824             Time Calculation- SLP    SLP Start Time 0752  -AD      SLP Stop Time 0805  -AD      SLP Time Calculation (min) 13 min  -AD      Total Timed Code Minutes- SLP 0 minute(s)  -AD      SLP Non-Billable Time (min) 0 min  -AD      SLP Received On 03/29/25  -AD         Untimed Charges    [id number] Eval Speech and Production w/ Language Minutes 13  -AD         Total Minutes    Untimed Charges Total Minutes 13  -AD       Total Minutes 13  -AD                User Key  (r) = Recorded By, (t) = Taken By, (c) = Cosigned By      Initials Name Provider Type    AD Diana Cheek, MS CCC-SLP Speech and Language Pathologist                    Therapy Charges for Today       Code Description Service Date Service Provider Modifiers Qty    [id number] HC ST EVAL SPEECH AND PROD W LANG  1 3/29/2025 Diana Cheek MS CCC-SLP GN 1                     SLP Discharge Summary  Anticipated Discharge Disposition (SLP): No further SLP services warranted    Diana Cheek MS CCC-SLP  3/29/2025

## 2025-03-29 NOTE — ED PROVIDER NOTES
"Subjective   History of Present Illness  50-year-old male patient with chief complaint of mental status changes.  Patient is accompanied bedside with his wife.  Wife states around 10:20 PM patient was laying in bed.  Patient called to his wife stating he needed help.  Patient repeatedly stated at bedside \"my mouth, my mouth\".  Patient's wife gave the patient water.  Patient had a blank stare.  Patient was aphasic and it was difficult for him to talk.  Patient has history of A-fib.  He is currently on Eliquis.  Patient has history of cardiomyopathy, diabetes, hypertension.  Wife states symptoms started at 10:20 PM        Review of Systems   Neurological:  Positive for speech difficulty.   All other systems reviewed and are negative.      Past Medical History:   Diagnosis Date    Anxiety     Cardiomyopathy     Depression     Diabetes mellitus     Elevated cholesterol     Hyperlipidemia     Hypertension     Varicose veins        Allergies   Allergen Reactions    Egg-Derived Products Swelling    Fish-Derived Products Swelling       Past Surgical History:   Procedure Laterality Date    CARDIAC CATHETERIZATION      CARDIAC CATHETERIZATION N/A 5/2/2022    Procedure: Coronary angiography;  Surgeon: Stanley Ramirez MD;  Location: [facility] INVASIVE LOCATION;  Service: Cardiovascular;  Laterality: N/A;    CARDIAC CATHETERIZATION N/A 5/2/2022    Procedure: Left Heart Cath;  Surgeon: Stanley Ramirez MD;  Location: [facility] CATH INVASIVE LOCATION;  Service: Cardiovascular;  Laterality: N/A;    CARDIAC CATHETERIZATION N/A 5/2/2022    Procedure: Left ventriculography;  Surgeon: Stanley Ramirez MD;  Location: [facility] CATH INVASIVE LOCATION;  Service: Cardiovascular;  Laterality: N/A;    CHOLECYSTECTOMY         Family History   Problem Relation Age of Onset    Hypertension Mother     Hypertension Father     Atrial fibrillation Father     Heart attack Father     Diabetes Other        Social History     Socioeconomic History    " Marital status:    Tobacco Use    Smoking status: Never    Smokeless tobacco: Never    Tobacco comments:     caffeine use- denies   Vaping Use    Vaping status: Never Used   Substance and Sexual Activity    Alcohol use: No    Drug use: No    Sexual activity: Defer           Objective   Physical Exam  Vitals and nursing note reviewed.   Constitutional:       General: He is not in acute distress.     Appearance: Normal appearance.   HENT:      Head: Normocephalic and atraumatic.      Right Ear: Tympanic membrane normal.      Left Ear: Tympanic membrane normal.      Mouth/Throat:      Mouth: Mucous membranes are moist.   Eyes:      Extraocular Movements: Extraocular movements intact.      Conjunctiva/sclera: Conjunctivae normal.   Cardiovascular:      Rate and Rhythm: Normal rate.      Pulses: Normal pulses.      Heart sounds: Normal heart sounds.   Pulmonary:      Effort: Pulmonary effort is normal.      Breath sounds: Normal breath sounds.   Abdominal:      Palpations: Abdomen is soft.      Tenderness: There is no abdominal tenderness. There is no guarding or rebound.   Musculoskeletal:         General: Normal range of motion.      Cervical back: Normal range of motion.   Skin:     General: Skin is warm.   Neurological:      General: No focal deficit present.      Mental Status: He is alert.      Comments: Patient is aphasic at bedside   Psychiatric:         Mood and Affect: Mood normal.         Procedures           ED Course                                                       Medical Decision Making  I spoke with neurologist Dr. Polo at 10:45 PM.  He is aware that patient has CT scans that are pending.  He is aware patient is on Eliquis.  Patient last received Eliquis at 7 PM.  Dr. Polo states patient is not a tPA candidate.  Dr. Polo states he will evaluate patient's images.  Labs and imaging currently pending.  Case signed off to     Amount and/or Complexity of Data Reviewed  Labs:  ordered.  Radiology: ordered.  ECG/medicine tests: ordered.    Risk  Prescription drug management.        Final diagnoses:   None       ED Disposition  ED Disposition       None            No follow-up provider specified.       Medication List      No changes were made to your prescriptions during this visit.            Henry Camara DO  03/28/25 5844       Henry Camara DO  03/28/25 9921

## 2025-03-29 NOTE — SIGNIFICANT NOTE
03/29/25 0778   OTHER   Discipline occupational therapist   Rehab Time/Intention   Session Not Performed other (see comments)  (OT orders received per stroke protocol. Pt reports all symptoms have resolved. He has no concerns for management of daily tasks upon discharge. No further OT services recommended.)

## 2025-03-29 NOTE — CASE MANAGEMENT/SOCIAL WORK
Continued Stay Note  [facility]     Patient Name: Santiago Zamora  MRN: [id number]  Today's Date: 3/29/2025    Admit Date: 3/28/2025    Plan: Home, family to transport   Discharge Plan       Row Name 03/29/25 1305       Plan    Plan Home, family to transport    Patient/Family in Agreement with Plan yes    Plan Comments Met with pt at [facility], introduced self and explained role. Verified facesheet, PCP is Quinn Stoner. Pt uses [facility] in [place]. Pt lives in a home with his spouse and is independent for ADLs and mobility. Pt does not use any DME at home. He has used [facility] previously, and has never been to rehab. At discharge, pt anticipates returning home with family to transport. No needs identified at this time. CCP will follow.                   Discharge Codes    No documentation.                 Expected Discharge Date and Time       Expected Discharge Date Expected Discharge Time    Mar 29, 2025               Osiel Godfrey RN

## 2025-03-29 NOTE — ED NOTES
Nursing report ED to floor  Santiago Zamora  50 y.o.  male    HPI :   Chief Complaint   Patient presents with    Altered Mental Status       Admitting doctor:   No admitting provider for patient encounter.    Admitting diagnosis:   The primary encounter diagnosis was Stroke-like symptoms. A diagnosis of Word finding difficulty was also pertinent to this visit.    Code status:   Current Code Status       Date Active Code Status Order ID Comments User Context       Prior            Allergies:   Egg-derived products and Fish-derived products    Isolation:   No active isolations    Intake and Output  No intake or output data in the 24 hours ending 03/29/25 0101    Weight:       03/28/25  2316   Weight: (!) 141 kg (311 lb 15.2 oz)       Most recent vitals:   Vitals:    03/28/25 2330 03/28/25 2335 03/29/25 0002 03/29/25 0032   BP: 145/90  129/84 140/86   BP Location:       Patient Position:       Pulse:  96 95 93   Resp:       Temp:       TempSrc:       SpO2: 94% 94% 93% 93%   Weight:       Height:           Active LDAs/IV Access:   Lines, Drains & Airways       Active LDAs       Name Placement date Placement time Site Days    Peripheral IV 03/28/25 2251 Right Antecubital 03/28/25 2251  Antecubital  less than 1                    Labs (abnormal labs have a star):   Labs Reviewed   COMPREHENSIVE METABOLIC PANEL - Abnormal; Notable for the following components:       Result Value    Glucose 376 (*)     Sodium 131 (*)     Chloride 94 (*)     All other components within normal limits    Narrative:     GFR Categories in Chronic Kidney Disease (CKD)      GFR Category          GFR (mL/min/1.73)    Interpretation  G1                     90 or greater         Normal or high (1)  G2                      60-89                Mild decrease (1)  G3a                   45-59                Mild to moderate decrease  G3b                   30-44                Moderate to severe decrease  G4                    15-29                Severe  decrease  G5                    14 or less           Kidney failure          (1)In the absence of evidence of kidney disease, neither GFR category G1 or G2 fulfill the criteria for CKD.    eGFR calculation 2021 CKD-EPI creatinine equation, which does not include race as a factor   CBC WITH AUTO DIFFERENTIAL - Abnormal; Notable for the following components:    Monocyte % 12.5 (*)     Eosinophil % 8.4 (*)     Immature Grans % 0.8 (*)     Eosinophils, Absolute 0.44 (*)     All other components within normal limits   URINALYSIS W/ MICROSCOPIC IF INDICATED (NO CULTURE) - Abnormal; Notable for the following components:    Glucose, UA >=1000 mg/dL (3+) (*)     Ketones, UA Trace (*)     All other components within normal limits    Narrative:     Urine microscopic not indicated.   POCT GLUCOSE FINGERSTICK - Abnormal; Notable for the following components:    Glucose 346 (*)     All other components within normal limits   PROTIME-INR - Normal    Narrative:     Therapeutic Ranges for INR: 2.0-3.0 (PT 20-30)                              2.5-3.5 (PT 25-34)   APTT - Normal    Narrative:     PTT = The equivalent PTT values for the therapeutic range of heparin levels at 0.1 to 0.7 U/ml are 53 to 110 seconds.     TROPONIN - Normal    Narrative:     High Sensitive Troponin T Reference Range:  <14.0 ng/L- Negative Female for AMI  <22.0 ng/L- Negative Male for AMI  >=14 - Abnormal Female indicating possible myocardial injury.  >=22 - Abnormal Male indicating possible myocardial injury.   Clinicians would have to utilize clinical acumen, EKG, Troponin, and serial changes to determine if it is an Acute Myocardial Infarction or myocardial injury due to an underlying chronic condition.        HIGH SENSITIVITIY TROPONIN T 1HR - Normal    Narrative:     High Sensitive Troponin T Reference Range:  <14.0 ng/L- Negative Female for AMI  <22.0 ng/L- Negative Male for AMI  >=14 - Abnormal Female indicating possible myocardial injury.  >=22 -  Abnormal Male indicating possible myocardial injury.   Clinicians would have to utilize clinical acumen, EKG, Troponin, and serial changes to determine if it is an Acute Myocardial Infarction or myocardial injury due to an underlying chronic condition.        RAINBOW DRAW    Narrative:     The following orders were created for panel order [place] Draw.  Procedure                               Abnormality         Status                     ---------                               -----------         ------                     Green Top (Gel)[[id number]]                                  Final result               Lavender Top[[id number]]                                     Final result               Gold Top - SST[[id number]]                                   Final result               Light Blue Top[[id number]]                                   Final result                 Please view results for these tests on the individual orders.   POCT GLUCOSE FINGERSTICK   TYPE AND SCREEN   CBC AND DIFFERENTIAL    Narrative:     The following orders were created for panel order CBC & Differential.  Procedure                               Abnormality         Status                     ---------                               -----------         ------                     CBC Auto Differential[[id number]]        Abnormal            Final result                 Please view results for these tests on the individual orders.   GREEN TOP   LAVENDER TOP   GOLD TOP - SST   LIGHT BLUE TOP       Results       Procedure Component Value Ref Range Date/Time    High Sensitivity Troponin T 1Hr [[id number]]  (Normal) Collected: 03/29/25 0026    Order Status: Completed Specimen: Blood Updated: 03/29/25 0046     HS Troponin T 7 <22 ng/L      Troponin T Numeric Delta 0 Abnormal if >/=3 ng/L     Narrative:      High Sensitive Troponin T Reference Range:  <14.0 ng/L- Negative Female for AMI  <22.0 ng/L- Negative Male for AMI  >=14 - Abnormal Female  indicating possible myocardial injury.  >=22 - Abnormal Male indicating possible myocardial injury.   Clinicians would have to utilize clinical acumen, EKG, Troponin, and serial changes to determine if it is an Acute Myocardial Infarction or myocardial injury due to an underlying chronic condition.         aPTT [[id number]]  (Normal) Collected: 03/28/25 2252    Order Status: Completed Specimen: Blood Updated: 03/28/25 2341     PTT 31.2 24.3 - 38.1 seconds     Narrative:      PTT = The equivalent PTT values for the therapeutic range of heparin levels at 0.1 to 0.7 U/ml are 53 to 110 seconds.      Protime-INR [[id number]]  (Normal) Collected: 03/28/25 2252    Order Status: Completed Specimen: Blood Updated: 03/28/25 2341     Protime 13.5 12.1 - 15.0 Seconds      INR 0.99 0.90 - 1.10     Narrative:      Therapeutic Ranges for INR: 2.0-3.0 (PT 20-30)                              2.5-3.5 (PT 25-34)    Urinalysis With Microscopic If Indicated (No Culture) - Urine, Clean Catch [[id number]]  (Abnormal) Collected: 03/28/25 2318    Order Status: Completed Specimen: Urine, Clean Catch Updated: 03/28/25 2337     Color, UA Yellow Yellow, Straw      Appearance, UA Clear Clear      pH, UA 5.5 4.5 - 8.0      Specific Gravity, UA 1.020 1.003 - 1.030      Glucose, UA >=1000 mg/dL (3+) Negative      Ketones, UA Trace Negative      Bilirubin, UA Negative Negative      Blood, UA Negative Negative      Protein, UA Negative Negative      Leuk Esterase, UA Negative Negative      Nitrite, UA Negative Negative      Urobilinogen, UA 1.0 E.U./dL 0.2 - 1.0 E.U./dL     Narrative:      Urine microscopic not indicated.    Comprehensive Metabolic Panel [[id number]]  (Abnormal) Collected: 03/28/25 2252    Order Status: Completed Specimen: Blood Updated: 03/28/25 2316     Glucose 376 65 - 99 mg/dL      BUN 14 6 - 20 mg/dL      Creatinine 0.99 0.76 - 1.27 mg/dL      Sodium 131 136 - 145 mmol/L      Potassium 4.3 3.5 - 5.2 mmol/L      Chloride 94 98 - 107  mmol/L      CO2 26.4 22.0 - 29.0 mmol/L      Calcium 9.4 8.6 - 10.5 mg/dL      Total Protein 6.8 6.0 - 8.5 g/dL      Albumin 3.9 3.5 - 5.2 g/dL      ALT (SGPT) 32 1 - 41 U/L      AST (SGOT) 22 1 - 40 U/L      Alkaline Phosphatase 95 39 - 117 U/L      Total Bilirubin 0.4 0.0 - 1.2 mg/dL      Globulin 2.9 gm/dL      A/G Ratio 1.3 g/dL      BUN/Creatinine Ratio 14.1 7.0 - 25.0      Anion Gap 10.6 5.0 - 15.0 mmol/L      eGFR 94.0 >60.0 mL/min/1.73     Narrative:      GFR Categories in Chronic Kidney Disease (CKD)      GFR Category          GFR (mL/min/1.73)    Interpretation  G1                     90 or greater         Normal or high (1)  G2                      60-89                Mild decrease (1)  G3a                   45-59                Mild to moderate decrease  G3b                   30-44                Moderate to severe decrease  G4                    15-29                Severe decrease  G5                    14 or less           Kidney failure          (1)In the absence of evidence of kidney disease, neither GFR category G1 or G2 fulfill the criteria for CKD.    eGFR calculation 2021 CKD-EPI creatinine equation, which does not include race as a factor    High Sensitivity Troponin T [[id number]]  (Normal) Collected: 03/28/25 2252    Order Status: Completed Specimen: Blood Updated: 03/28/25 2316     HS Troponin T 7 <22 ng/L     Narrative:      High Sensitive Troponin T Reference Range:  <14.0 ng/L- Negative Female for AMI  <22.0 ng/L- Negative Male for AMI  >=14 - Abnormal Female indicating possible myocardial injury.  >=22 - Abnormal Male indicating possible myocardial injury.   Clinicians would have to utilize clinical acumen, EKG, Troponin, and serial changes to determine if it is an Acute Myocardial Infarction or myocardial injury due to an underlying chronic condition.         [place] Draw [[id number]] Collected: 03/28/25 2252    Order Status: Completed Specimen: Blood Updated: 03/28/25 2300    Narrative:       The following orders were created for panel order [place] Draw.  Procedure                               Abnormality         Status                     ---------                               -----------         ------                     Green Top (Gel)[[id number]]                                  Final result               Lavender Top[[id number]]                                     Final result               Gold Top - SST[[id number]]                                   Final result               Light Blue Top[[id number]]                                   Final result                 Please view results for these tests on the individual orders.    CBC Auto Differential [[id number]]  (Abnormal) Collected: 03/28/25 2252    Order Status: Completed Specimen: Blood Updated: 03/28/25 2300     WBC 5.26 3.40 - 10.80 10*3/mm3      RBC 5.79 4.14 - 5.80 10*6/mm3      Hemoglobin 15.5 13.0 - 17.7 g/dL      Hematocrit 46.9 37.5 - 51.0 %      MCV 81.0 79.0 - 97.0 fL      MCH 26.8 26.6 - 33.0 pg      MCHC 33.0 31.5 - 35.7 g/dL      RDW 14.8 12.3 - 15.4 %      RDW-SD 43.0 37.0 - 54.0 fl      MPV 10.7 6.0 - 12.0 fL      Platelets 174 140 - 450 10*3/mm3      Neutrophil % 54.9 42.7 - 76.0 %      Lymphocyte % 23.0 19.6 - 45.3 %      Monocyte % 12.5 5.0 - 12.0 %      Eosinophil % 8.4 0.3 - 6.2 %      Basophil % 0.4 0.0 - 1.5 %      Immature Grans % 0.8 0.0 - 0.5 %      Neutrophils, Absolute 2.89 1.70 - 7.00 10*3/mm3      Lymphocytes, Absolute 1.21 0.70 - 3.10 10*3/mm3      Monocytes, Absolute 0.66 0.10 - 0.90 10*3/mm3      Eosinophils, Absolute 0.44 0.00 - 0.40 10*3/mm3      Basophils, Absolute 0.02 0.00 - 0.20 10*3/mm3      Immature Grans, Absolute 0.04 0.00 - 0.05 10*3/mm3      nRBC 0.0 0.0 - 0.2 /100 WBC     POC Glucose Once [[id number]]  (Abnormal) Collected: 03/28/25 2241    Order Status: Completed Specimen: Blood Updated: 03/28/25 2247     Glucose 346 70 - 130 mg/dL     POC Glucose Once [[id number]]     Order Status: Completed  Specimen: Blood              EKG:   ECG 12 Lead ED Triage Standing Order; Acute Stroke (Onset <24 hrs)   Preliminary Result   HEART EMVI=155  bpm   RR Ynganxkc=830  ms   SD Hxgiprva=516  ms   P Horizontal Axis=10  deg   P Front Axis=47  deg   QRSD Budrvwgi=150  ms   QT Rmttbodf=547  ms   MStF=812  ms   QRS Axis=-30  deg   T Wave Axis=1  deg   - ABNORMAL ECG -   Sinus tachycardia   Inferior infarct, old   Anterior infarct, old   Date and Time of Study:2025-03-28 23:18:14          Meds given in ED:   Medications   sodium chloride 0.9 % flush 10 mL (has no administration in time range)   iopamidol (ISOVUE-370) 76 % injection 50 mL (has no administration in time range)   iopamidol (ISOVUE-370) 76 % injection 50 mL (50 mL Intravenous Given 3/28/25 2324)   iopamidol (ISOVUE-370) 76 % injection 50 mL (50 mL Intravenous Given 3/28/25 2330)   iopamidol (ISOVUE-370) 76 % injection 50 mL (50 mL Intravenous Given 3/28/25 2333)       Imaging results:  CT Angiogram Head w AI Analysis of LVO  Result Date: 3/29/2025  Impression: No significant vascular abnormality in the head or the neck. Electronically Signed: Jerome Villagomez MD  3/29/2025 12:22 AM EDT  Workstation ID: [id number]    CT Angiogram Neck  Result Date: 3/29/2025  Impression: No significant vascular abnormality in the head or the neck. Electronically Signed: Jerome Villagomez MD  3/29/2025 12:22 AM EDT  Workstation ID: [id number]    CT CEREBRAL PERFUSION WITH & WITHOUT CONTRAST  Result Date: 3/28/2025  Impression: No significant perfusion abnormality in the brain. Electronically Signed: Jerome Villagomez MD  3/28/2025 11:30 PM EDT  Workstation ID: [id number]    XR Chest 1 View  Result Date: 3/28/2025  Impression: No active disease. Electronically Signed: Jerome Villagomez MD  3/28/2025 11:28 PM EDT  Workstation ID: [id number]    CT Head Without Contrast Stroke Protocol  Result Date: 3/28/2025  Impression: No acute intracranial abnormality. Left maxillary sinus air-fluid level. Correlate  clinically for acute sinusitis. Electronically Signed: Elier Licea MD  3/28/2025 10:54 PM EDT  Workstation ID: [id number]      Ambulatory status:   - stand by assist     Social issues:   Social History     Socioeconomic History    Marital status:    Tobacco Use    Smoking status: Never    Smokeless tobacco: Never    Tobacco comments:     caffeine use- denies   Vaping Use    Vaping status: Never Used   Substance and Sexual Activity    Alcohol use: No    Drug use: No    Sexual activity: Defer       NIH Stroke Scale:  Interval: baseline      Darcy Washington RN  03/29/25 01:01 EDT

## 2025-03-29 NOTE — OUTREACH NOTE
Prep Survey      Flowsheet Row Responses   [facility] facility patient discharged from? [place]   Is LACE score < 7 ? Yes   Eligibility [facility]   Date of Admission 03/28/25   Date of Discharge 03/29/25   Discharge Disposition Home or Self Care   Discharge diagnosis Stroke-like symptoms   Does the patient have one of the following disease processes/diagnoses(primary or secondary)? Other   Does the patient have Home health ordered? No   Is there a DME ordered? No   Prep survey completed? Yes            Anayeli LIMA - Registered Nurse

## 2025-03-29 NOTE — SIGNIFICANT NOTE
03/29/25 1220   OTHER   Discipline physical therapist   Rehab Time/Intention   Session Not Performed other (see comments)  (PT orders received per stroke protocol. Pt reports all symptoms have resolved and he is having no problem with his mobility or gait & has no concerns with going home. He reports he has been walking around room with no issues. No PT needs at this time.)   Therapy Assessment/Plan (PT)   Criteria for Skilled Interventions Met (PT) no problems identified which require skilled intervention

## 2025-03-29 NOTE — NURSING NOTE
Oriented to room, controls and tv. Verbalized understanding. Return demonstration obtained. Swallowing study was done by Darcy in the ER without deficits noted.

## 2025-03-29 NOTE — PLAN OF CARE
Goal Outcome Evaluation:  Plan of Care Reviewed With: patient           Outcome Evaluation: SLP: Communication Evaluaton completed. Pt with functional understanding and verbal expression. No gross deficits noted. Able to converse at the conversational level. Speech is precise at the conversational level. Able to name, repeat and follow 1, 2, 3 step commands. No facial asymmetry or weakness noted. No further SLP indicated at this time as functional performance and all symptoms have resolved.    Anticipated Discharge Disposition (SLP): No further SLP services warranted    SLP Diagnosis: functional speech/language skills, functional cognitive-linguistic skills (03/29/25 0752)  SLP Diagnosis Comments: All symptoms have resolved per pt. Normal auditory comprehension and verbal expression noted during conversational speech. No motor speech deficits and no gross cognitive deficits. (03/29/25 0752)

## 2025-03-29 NOTE — H&P
"[facility] HOSPITALIST     PCP: Quinn Stoner DO    CODE status: Full     CHIEF COMPLAINT: speech difficulties     HISTORY OF PRESENT ILLNESS:  51 y/o male with hx of CM, PAF on Eliquis, HTN, DM II, and hyperlipidemia, presents to [facility] for word findings difficulties.  Patient was lying in bed around 10:20 last night when he called out to his wife saying \"my mouth, my mouth.\"  Patient says his mouth felt dry, he wasn't able to get his words out.  He also felt like  his brain wasn't communicating with his body.  He is on Eliquis and took his dose around 7 PM last night and hasn't missed any doses.  Upon arrival to the ED, he still felt that he was having some trouble finding his words and he couldn't get himself out of the wheelchair.  He denies any blurry vision, headache, or unilateral weakness or numbness.  He says over the last week he has been dealing with sinus congestion and his head has felt stopped up.  He has felt warm at times, but is not sure if he was having fevers.  In the ED, code stroke was called. He underwent CT head, CTA head and neck, and CTP all showing no acute findings.  Currently, he feels back to normal.  He is now able to find his words and feels like his brain is back communicating with his body.           Past Medical History:   Diagnosis Date    Anxiety     Cardiomyopathy     Depression     Diabetes mellitus     Elevated cholesterol     Hyperlipidemia     Hypertension     Varicose veins      Past Surgical History:   Procedure Laterality Date    CARDIAC CATHETERIZATION      CARDIAC CATHETERIZATION N/A 5/2/2022    Procedure: Coronary angiography;  Surgeon: Stanley Ramirez MD;  Location: [facility] CATH INVASIVE LOCATION;  Service: Cardiovascular;  Laterality: N/A;    CARDIAC CATHETERIZATION N/A 5/2/2022    Procedure: Left Heart Cath;  Surgeon: Stanley Ramirez MD;  Location: [facility] CATH INVASIVE LOCATION;  Service: Cardiovascular;  Laterality: N/A;    " "CARDIAC CATHETERIZATION N/A 5/2/2022    Procedure: Left ventriculography;  Surgeon: Stanley Ramirez MD;  Location:  [facility] CATH INVASIVE LOCATION;  Service: Cardiovascular;  Laterality: N/A;    CHOLECYSTECTOMY       Family History   Problem Relation Age of Onset    Hypertension Mother     Hypertension Father     Atrial fibrillation Father     Heart attack Father     Diabetes Other      Social History     Tobacco Use    Smoking status: Never    Smokeless tobacco: Never    Tobacco comments:     caffeine use- denies   Vaping Use    Vaping status: Never Used   Substance Use Topics    Alcohol use: No    Drug use: No     (Not in a hospital admission)    Allergies:  Egg-derived products and Fish-derived products      There is no immunization history on file for this patient.    REVIEW OF SYSTEMS:  Please see the above history of present illness for pertinent positives and negatives.  The remainder of the patient's systems have been reviewed and are negative.     Vital Signs  Temp:  [98 °F (36.7 °C)] 98 °F (36.7 °C)  Heart Rate:  [] 93  Resp:  [16] 16  BP: (129-173)/() 140/86  Flowsheet Rows      Flowsheet Row First Filed Value   Admission Height 182.9 cm (72.01\") Documented at 03/28/2025 2316   Admission Weight 141 kg (311 lb 15.2 oz) Documented at 03/28/2025 2316               Physical Exam:  General: Patient is awake and alert. Lying in bed; NAD  HENT: Head is atraumatic, normocephalic.   Eyes: Vision is grossly intact. Pupils appear equal and round.   Cardiovascular: RRR, S1-S2   Respiratory: Lungs are clear to ausculation without wheezes, rhonchi or rales.   Abdominal/GI: Obese, soft, NT, ND   Extremities: No peripheral edema noted.   Musculoskeletal: Spontaneous movement of bilateral upper and lower extremities against gravity noted. No signs of injury or deformity noted.   Skin: Warm and dry.   Psych: Mood and affect are appropriate. Cooperative with exam.   Neuro: No facial asymmetry noted. No focal " deficits noted, hearing and vision are grossly intact.  Speech clear          Results Review:    I reviewed the patient's new clinical results.  Lab Results (most recent)       Procedure Component Value Units Date/Time    High Sensitivity Troponin T 1Hr [[id number]]  (Normal) Collected: 03/29/25 0026    Specimen: Blood Updated: 03/29/25 0046     HS Troponin T 7 ng/L      Troponin T Numeric Delta 0 ng/L     Narrative:      High Sensitive Troponin T Reference Range:  <14.0 ng/L- Negative Female for AMI  <22.0 ng/L- Negative Male for AMI  >=14 - Abnormal Female indicating possible myocardial injury.  >=22 - Abnormal Male indicating possible myocardial injury.   Clinicians would have to utilize clinical acumen, EKG, Troponin, and serial changes to determine if it is an Acute Myocardial Infarction or myocardial injury due to an underlying chronic condition.         aPTT [[id number]]  (Normal) Collected: 03/28/25 2252    Specimen: Blood Updated: 03/28/25 2341     PTT 31.2 seconds     Narrative:      PTT = The equivalent PTT values for the therapeutic range of heparin levels at 0.1 to 0.7 U/ml are 53 to 110 seconds.      Protime-INR [[id number]]  (Normal) Collected: 03/28/25 2252    Specimen: Blood Updated: 03/28/25 2341     Protime 13.5 Seconds      INR 0.99    Narrative:      Therapeutic Ranges for INR: 2.0-3.0 (PT 20-30)                              2.5-3.5 (PT 25-34)    Urinalysis With Microscopic If Indicated (No Culture) - Urine, Clean Catch [[id number]]  (Abnormal) Collected: 03/28/25 2318    Specimen: Urine, Clean Catch Updated: 03/28/25 2337     Color, UA Yellow     Appearance, UA Clear     pH, UA 5.5     Specific Gravity, UA 1.020     Glucose, UA >=1000 mg/dL (3+)     Ketones, UA Trace     Bilirubin, UA Negative     Blood, UA Negative     Protein, UA Negative     Leuk Esterase, UA Negative     Nitrite, UA Negative     Urobilinogen, UA 1.0 E.U./dL    Narrative:      Urine microscopic not indicated.    Comprehensive  Metabolic Panel [[id number]]  (Abnormal) Collected: 03/28/25 2252    Specimen: Blood Updated: 03/28/25 2316     Glucose 376 mg/dL      BUN 14 mg/dL      Creatinine 0.99 mg/dL      Sodium 131 mmol/L      Potassium 4.3 mmol/L      Chloride 94 mmol/L      CO2 26.4 mmol/L      Calcium 9.4 mg/dL      Total Protein 6.8 g/dL      Albumin 3.9 g/dL      ALT (SGPT) 32 U/L      AST (SGOT) 22 U/L      Alkaline Phosphatase 95 U/L      Total Bilirubin 0.4 mg/dL      Globulin 2.9 gm/dL      A/G Ratio 1.3 g/dL      BUN/Creatinine Ratio 14.1     Anion Gap 10.6 mmol/L      eGFR 94.0 mL/min/1.73     Narrative:      GFR Categories in Chronic Kidney Disease (CKD)      GFR Category          GFR (mL/min/1.73)    Interpretation  G1                     90 or greater         Normal or high (1)  G2                      60-89                Mild decrease (1)  G3a                   45-59                Mild to moderate decrease  G3b                   30-44                Moderate to severe decrease  G4                    15-29                Severe decrease  G5                    14 or less           Kidney failure          (1)In the absence of evidence of kidney disease, neither GFR category G1 or G2 fulfill the criteria for CKD.    eGFR calculation 2021 CKD-EPI creatinine equation, which does not include race as a factor    High Sensitivity Troponin T [[id number]]  (Normal) Collected: 03/28/25 2252    Specimen: Blood Updated: 03/28/25 2316     HS Troponin T 7 ng/L     Narrative:      High Sensitive Troponin T Reference Range:  <14.0 ng/L- Negative Female for AMI  <22.0 ng/L- Negative Male for AMI  >=14 - Abnormal Female indicating possible myocardial injury.  >=22 - Abnormal Male indicating possible myocardial injury.   Clinicians would have to utilize clinical acumen, EKG, Troponin, and serial changes to determine if it is an Acute Myocardial Infarction or myocardial injury due to an underlying chronic condition.         [place] Draw  [[id number]] Collected: 03/28/25 2252    Specimen: Blood Updated: 03/28/25 2300    Narrative:      The following orders were created for panel order [place] Draw.  Procedure                               Abnormality         Status                     ---------                               -----------         ------                     Green Top (Gel)[[id number]]                                  Final result               Lavender Top[[id number]]                                     Final result               Gold Top - SST[[id number]]                                   Final result               Light Blue Top[[id number]]                                   Final result                 Please view results for these tests on the individual orders.    CBC & Differential [[id number]]  (Abnormal) Collected: 03/28/25 2252    Specimen: Blood Updated: 03/28/25 2300    Narrative:      The following orders were created for panel order CBC & Differential.  Procedure                               Abnormality         Status                     ---------                               -----------         ------                     CBC Auto Differential[[id number]]        Abnormal            Final result                 Please view results for these tests on the individual orders.    Green Top (Gel) [[id number]] Collected: 03/28/25 2252    Specimen: Blood Updated: 03/28/25 2300     Extra Tube Hold for add-ons.     Comment: Auto resulted.       Lavender Top [[id number]] Collected: 03/28/25 2252    Specimen: Blood Updated: 03/28/25 2300     Extra Tube hold for add-on     Comment: Auto resulted       Gold Top - SST [[id number]] Collected: 03/28/25 2252    Specimen: Blood Updated: 03/28/25 2300     Extra Tube Hold for add-ons.     Comment: Auto resulted.       Light Blue Top [[id number]] Collected: 03/28/25 2252    Specimen: Blood Updated: 03/28/25 2300     Extra Tube Hold for add-ons.     Comment: Auto resulted       CBC Auto Differential  [[id number]]  (Abnormal) Collected: 03/28/25 2252    Specimen: Blood Updated: 03/28/25 2300     WBC 5.26 10*3/mm3      RBC 5.79 10*6/mm3      Hemoglobin 15.5 g/dL      Hematocrit 46.9 %      MCV 81.0 fL      MCH 26.8 pg      MCHC 33.0 g/dL      RDW 14.8 %      RDW-SD 43.0 fl      MPV 10.7 fL      Platelets 174 10*3/mm3      Neutrophil % 54.9 %      Lymphocyte % 23.0 %      Monocyte % 12.5 %      Eosinophil % 8.4 %      Basophil % 0.4 %      Immature Grans % 0.8 %      Neutrophils, Absolute 2.89 10*3/mm3      Lymphocytes, Absolute 1.21 10*3/mm3      Monocytes, Absolute 0.66 10*3/mm3      Eosinophils, Absolute 0.44 10*3/mm3      Basophils, Absolute 0.02 10*3/mm3      Immature Grans, Absolute 0.04 10*3/mm3      nRBC 0.0 /100 WBC     POC Glucose Once [[id number]]  (Abnormal) Collected: 03/28/25 2241    Specimen: Blood Updated: 03/28/25 2247     Glucose 346 mg/dL             Imaging Results (Most Recent)       Procedure Component Value Units Date/Time    CT Angiogram Head w AI Analysis of LVO [[id number]] Collected: 03/29/25 0020     Updated: 03/29/25 0026    Narrative:      CT ANGIOGRAM HEAD W AI ANALYSIS OF LVO, CT ANGIOGRAM NECK    Date of Exam: 3/28/2025 11:17 PM EDT    Indication: Neuro Deficit, acute, Stroke suspected  Neuro deficit, acute stroke suspected.    Comparison: None available.    Technique: CTA of the head was performed after the uneventful intravenous administration of iodinated contrast. Reconstructed coronal and sagittal images were also obtained. In addition, a 3-D volume rendered image was created for interpretation.   Automated exposure control and iterative reconstruction methods were used.      Findings:  Aortic arch: The aortic arch is unremarkable.  There is conventional 3 vessel arch anatomy.  The right brachiocephalic and visualized bilateral subclavian arteries are within normal limits.    Right carotid: The right CCA arises as expected from the brachiocephalic trunk.  The CCA follows a normal  course and appears normal caliber.  The carotid bifurcation is unremarkable.  The external carotid artery and distal branches appear within normal   limits.  The cervical internal carotid artery follows a normal course and appears normal caliber throughout the neck and into the head.  The intracranial ICA segments appear within normal limits.  The ophthalmic artery origin is normal.  The A1 and M1   segments appear within normal limits.  The visualized distal ANGELA and MCA branches appear patent.  There is  a patent  anterior communicating artery. There is a patent  posterior communicating artery.    Left carotid: The left CCA arises as expected from the aortic arch.   The CCA follows a normal course and appears normal caliber.  The carotid bifurcation is unremarkable.  The external carotid artery and distal branches appear within normal limits.  The   cervical internal carotid artery follows a normal course and appears normal caliber throughout the neck and into the head.  The intracranial ICA segments appear within normal limits.  The ophthalmic artery origin is normal.  The A1 and M1 segments   appear within normal limits.  The visualized distal ANGELA and MCA branches appear patent.  There is a patent  posterior communicating artery.    Posterior circulation: Vertebral arteries arise as expected from ipsilateral subclavian arteries. The left vertebral artery is dominant. The vertebral arteries follow a normal course and appear normal caliber throughout the neck and into the head.  The   V4 segments are patent.  Visualized posterior inferior cerebellar arteries are within normal limits.  The basilar artery is normal caliber.  Superior cerebellar arteries are patent.  Bilateral P1 segments and posterior cerebral arteries appear within   normal limits.      Nonvascular findings: Intracranial structures appear unremarkable.  Orbits are within normal limits.  Paranasal sinuses and mastoid air cells appear well aerated.   Superficial soft tissues and underlying musculature appear within normal limits.  The lung   apices are clear.  The thyroid and salivary glands appear unremarkable.  The suprahyoid and infrahyoid spaces of the neck appear unremarkable.  There is no evidence of cervical lymphadenopathy or a neck mass.  There are no acute osseous abnormalities or   destructive bone lesions.      Impression:      Impression:  No significant vascular abnormality in the head or the neck.            Electronically Signed: Jerome Villagomez MD    3/29/2025 12:22 AM EDT    Workstation ID: [id number]    CT Angiogram Neck [[id number]] Collected: 03/29/25 0020     Updated: 03/29/25 0026    Narrative:      CT ANGIOGRAM HEAD W AI ANALYSIS OF LVO, CT ANGIOGRAM NECK    Date of Exam: 3/28/2025 11:17 PM EDT    Indication: Neuro Deficit, acute, Stroke suspected  Neuro deficit, acute stroke suspected.    Comparison: None available.    Technique: CTA of the head was performed after the uneventful intravenous administration of iodinated contrast. Reconstructed coronal and sagittal images were also obtained. In addition, a 3-D volume rendered image was created for interpretation.   Automated exposure control and iterative reconstruction methods were used.      Findings:  Aortic arch: The aortic arch is unremarkable.  There is conventional 3 vessel arch anatomy.  The right brachiocephalic and visualized bilateral subclavian arteries are within normal limits.    Right carotid: The right CCA arises as expected from the brachiocephalic trunk.  The CCA follows a normal course and appears normal caliber.  The carotid bifurcation is unremarkable.  The external carotid artery and distal branches appear within normal   limits.  The cervical internal carotid artery follows a normal course and appears normal caliber throughout the neck and into the head.  The intracranial ICA segments appear within normal limits.  The ophthalmic artery origin is normal.  The A1 and M1    segments appear within normal limits.  The visualized distal ANGELA and MCA branches appear patent.  There is  a patent  anterior communicating artery. There is a patent  posterior communicating artery.    Left carotid: The left CCA arises as expected from the aortic arch.   The CCA follows a normal course and appears normal caliber.  The carotid bifurcation is unremarkable.  The external carotid artery and distal branches appear within normal limits.  The   cervical internal carotid artery follows a normal course and appears normal caliber throughout the neck and into the head.  The intracranial ICA segments appear within normal limits.  The ophthalmic artery origin is normal.  The A1 and M1 segments   appear within normal limits.  The visualized distal ANGELA and MCA branches appear patent.  There is a patent  posterior communicating artery.    Posterior circulation: Vertebral arteries arise as expected from ipsilateral subclavian arteries. The left vertebral artery is dominant. The vertebral arteries follow a normal course and appear normal caliber throughout the neck and into the head.  The   V4 segments are patent.  Visualized posterior inferior cerebellar arteries are within normal limits.  The basilar artery is normal caliber.  Superior cerebellar arteries are patent.  Bilateral P1 segments and posterior cerebral arteries appear within   normal limits.      Nonvascular findings: Intracranial structures appear unremarkable.  Orbits are within normal limits.  Paranasal sinuses and mastoid air cells appear well aerated.  Superficial soft tissues and underlying musculature appear within normal limits.  The lung   apices are clear.  The thyroid and salivary glands appear unremarkable.  The suprahyoid and infrahyoid spaces of the neck appear unremarkable.  There is no evidence of cervical lymphadenopathy or a neck mass.  There are no acute osseous abnormalities or   destructive bone lesions.      Impression:       Impression:  No significant vascular abnormality in the head or the neck.            Electronically Signed: Jerome Villagomez MD    3/29/2025 12:22 AM EDT    Workstation ID: [id number]    CT CEREBRAL PERFUSION WITH & WITHOUT CONTRAST [[id number]] Collected: 03/28/25 2328     Updated: 03/28/25 2333    Narrative:      CT CEREBRAL PERFUSION W WO CONTRAST    Date of Exam: 3/28/2025 11:07 PM EDT    Indication: Neuro deficit, acute, stroke suspected  Neuro Deficit, acute, Stroke suspected.     Comparison: Noncontrast CT scan of the head obtained earlier same day    Technique: Axial CT images of the brain were obtained prior to and after the administration of iodinated contrast. CT Perfusion protocol was utilized. Automated post processing was performed by RAPID software and submitted to PACS for interpretation.   Automated exposure control and iterative reconstruction was utilized.      Findings:  Cerebral blood flow, blood volume and mean transit time maps are symmetric without large perfusion defect.    CBF<30% volume: 0 mL  Tmax>6sec volume: 0 mL  Mismatch volume: 0 mL  Mismatch ratio: None.      Impression:      Impression:  No significant perfusion abnormality in the brain.            Electronically Signed: Jerome Villagomez MD    3/28/2025 11:30 PM EDT    Workstation ID: [id number]    XR Chest 1 View [[id number]] Collected: 03/28/25 2327     Updated: 03/28/25 2331    Narrative:      XR CHEST 1 VW    Date of Exam: 3/28/2025 11:15 PM EDT    Indication: Acute Stroke Protocol (Onset < 12 hrs)    Comparison: Chest radiograph 4/13/2022    Findings:  There are no airspace consolidations. No pleural fluid. No pneumothorax. The pulmonary vasculature appears within normal limits. The cardiac and mediastinal silhouette appear unremarkable. No acute osseous abnormality identified.      Impression:      Impression:  No active disease.          Electronically Signed: Jerome Villagomez MD    3/28/2025 11:28 PM EDT    Workstation ID: [id number]    CT  Head Without Contrast Stroke Protocol [[id number]] Collected: 03/28/25 2251     Updated: 03/28/25 2257    Narrative:      CT HEAD WO CONTRAST STROKE PROTOCOL    Date of Exam: 3/28/2025 10:38 PM EDT    Indication: Neuro deficit, acute, stroke suspected  Neuro Deficit, acute, Stroke suspected.    Comparison: None available.    Technique: Axial CT images were obtained of the head without contrast administration.  Reconstructed coronal and sagittal images were also obtained. Automated exposure control and iterative construction methods were used.      Findings:  No focal brain lesion, mass or intracranial hemorrhage is seen. The ventricles are normal in size and configuration.    An air-fluid level is noted in the left maxillary sinus. Mucoperiosteal thickening is noted elsewhere in the paranasal sinuses bilaterally. No focal calvarial abnormality is seen.      Impression:      Impression:  No acute intracranial abnormality.  Left maxillary sinus air-fluid level. Correlate clinically for acute sinusitis.        Electronically Signed: Elier Licea MD    3/28/2025 10:54 PM EDT    Workstation ID: [id number]          reviewed    ECG/EMG Results (most recent)       Procedure Component Value Units Date/Time    ECG 12 Lead ED Triage Standing Order; Acute Stroke (Onset <24 hrs) [[id number]] Collected: 03/28/25 2318     Updated: 03/28/25 2319     QT Interval 352 ms      QTC Interval 454 ms     Narrative:      HEART VUYN=560  bpm  RR Qwyyezpd=582  ms  VT Ljqmwxtx=352  ms  P Horizontal Axis=10  deg  P Front Axis=47  deg  QRSD Vzljakbt=200  ms  QT Yplwchfv=622  ms  TOnA=209  ms  QRS Axis=-30  deg  T Wave Axis=1  deg  - ABNORMAL ECG -  Sinus tachycardia  Inferior infarct, old  Anterior infarct, old  Date and Time of Study:2025-03-28 23:18:14          reviewed    Assessment & Plan     Word finding difficulties  Code stroke called in ED  CT head and CTA head and neck along with CTP all reviewed and shows no acute findings  Now  resolved  Admit and monitor   Check MRI brain  Consult Neurology   Continue home Eliquis   Check lipid panel and hgb A1c      Atrial fibrillation  Rate controlled and in NSR on my exam   Continue sotalol, metoprolol, and Eliquis    DM II  Hold metformin; continue glipizide   Also  on Mounjaro, but wife says it is causing him some problems and not really controlling his blood sugars  Recommend following up with PCP to discuss other treatment options   Accuchecks with SSI    Hyperlipidemia  Continue statin   Await lipid panel     HTN  BP was up a bit in ED; better today  Continue metoprolol and     Suspected sinusitis  Noted fullness maxillary sinus; patient having symptoms x 1 week  Await MRI; consider starting oral abx    DVT ppx:  Nicole Lechuga,   03/29/25  01:06 EDT        At [facility], we believe that sharing information builds trust and better relationships. You are receiving this note because you recently visited [facility]. It is possible you will see health information before a provider has talked with you about it. This kind of information can be easy to misunderstand. To help you fully understand what it means for your health, we urge you to discuss this note with your provider.

## 2025-03-29 NOTE — DISCHARGE INSTR - APPOINTMENTS
Patient will need to call Dr. Stoner office to schedule follow up appointment for next week                                  [phone]

## 2025-03-29 NOTE — CASE MANAGEMENT/SOCIAL WORK
Case Management Discharge Note      Final Note: dc home         Selected Continued Care - Discharged on 3/29/2025 Admission date: 3/28/2025 - Discharge disposition: Home or Self Care      Destination    No services have been selected for the patient.                Durable Medical Equipment    No services have been selected for the patient.                Dialysis/Infusion    No services have been selected for the patient.                Home Medical Care    No services have been selected for the patient.                Therapy    No services have been selected for the patient.                Community Resources    No services have been selected for the patient.                Community & DME    No services have been selected for the patient.                    Transportation Services  Private: Car    Final Discharge Disposition Code: 01 - home or self-care

## 2025-03-29 NOTE — DISCHARGE SUMMARY
Santiago Zamora  1975  [id number]    Hospitalists Discharge Summary    Date of Admission: 3/28/2025  Date of Discharge:  3/29/2025    Primary Discharge Diagnoses: ***    Secondary Discharge Diagnoses: ***      History of Present Illness:***    Hospital Course:      PCP  Patient Care Team:  Quinn Stoner DO as PCP - General (Family Medicine)  Quinn Stoner DO    Consults:   Consults       Date and Time Order Name Status Description    3/29/2025  1:49 AM Inpatient Neurology Consult Stroke Completed     3/28/2025 10:42 PM Inpatient Neurology Consult Stroke Completed     3/28/2025 10:42 PM Inpatient Neurology Consult Stroke Completed             Operations and Procedures Performed:       MRI Brain Without Contrast  Result Date: 3/29/2025  Narrative: MRI BRAIN WO CONTRAST Date of Exam: 3/29/2025 9:12 AM EDT Indication: word findings difficulties.  Comparison: None available. Technique:  Routine multiplanar/multisequence sequence images of the brain were obtained without contrast administration. FINDINGS: No abnormal diffusion restriction is observed to indicate acute or subacute focal ischemia. Minimal white matter signal changes are noted likely related to early age-related changes. There is no evidence for abnormal cerebral edema. There is no mass effect or midline shift. The ventricular system is nondilated. The basilar cisterns are patent. The midline structures are unremarkable. No significant extra-axial fluid collections are identified. Changes of chronic sinusitis are noted. There is an air-fluid level in the left maxillary sinus suggesting acute sinusitis.     Impression: 1.No evidence for acute or subacute focal ischemia. 2.Minimal white matter signal changes are noted likely age-related changes. 3.Evidence for acute on chronic sinusitis. Electronically Signed: Alex Cline MD  3/29/2025 10:07 AM EDT  Workstation ID: [id number]    CT Angiogram Head w AI Analysis of LVO  Result Date:  3/29/2025  Narrative: CT ANGIOGRAM HEAD W AI ANALYSIS OF LVO, CT ANGIOGRAM NECK Date of Exam: 3/28/2025 11:17 PM EDT Indication: Neuro Deficit, acute, Stroke suspected Neuro deficit, acute stroke suspected. Comparison: None available. Technique: CTA of the head was performed after the uneventful intravenous administration of iodinated contrast. Reconstructed coronal and sagittal images were also obtained. In addition, a 3-D volume rendered image was created for interpretation. Automated exposure control and iterative reconstruction methods were used. Findings: Aortic arch: The aortic arch is unremarkable.  There is conventional 3 vessel arch anatomy.  The right brachiocephalic and visualized bilateral subclavian arteries are within normal limits. Right carotid: The right CCA arises as expected from the brachiocephalic trunk.  The CCA follows a normal course and appears normal caliber.  The carotid bifurcation is unremarkable.  The external carotid artery and distal branches appear within normal limits.  The cervical internal carotid artery follows a normal course and appears normal caliber throughout the neck and into the head.  The intracranial ICA segments appear within normal limits.  The ophthalmic artery origin is normal.  The A1 and M1 segments appear within normal limits.  The visualized distal ANGELA and MCA branches appear patent.  There is  a patent  anterior communicating artery. There is a patent  posterior communicating artery. Left carotid: The left CCA arises as expected from the aortic arch.   The CCA follows a normal course and appears normal caliber.  The carotid bifurcation is unremarkable.  The external carotid artery and distal branches appear within normal limits.  The  cervical internal carotid artery follows a normal course and appears normal caliber throughout the neck and into the head.  The intracranial ICA segments appear within normal limits.  The ophthalmic artery origin is normal.  The A1  and M1 segments appear within normal limits.  The visualized distal ANGELA and MCA branches appear patent.  There is a patent  posterior communicating artery. Posterior circulation: Vertebral arteries arise as expected from ipsilateral subclavian arteries. The left vertebral artery is dominant. The vertebral arteries follow a normal course and appear normal caliber throughout the neck and into the head.  The V4 segments are patent.  Visualized posterior inferior cerebellar arteries are within normal limits.  The basilar artery is normal caliber.  Superior cerebellar arteries are patent.  Bilateral P1 segments and posterior cerebral arteries appear within normal limits. Nonvascular findings: Intracranial structures appear unremarkable.  Orbits are within normal limits.  Paranasal sinuses and mastoid air cells appear well aerated.  Superficial soft tissues and underlying musculature appear within normal limits.  The lung  apices are clear.  The thyroid and salivary glands appear unremarkable.  The suprahyoid and infrahyoid spaces of the neck appear unremarkable.  There is no evidence of cervical lymphadenopathy or a neck mass.  There are no acute osseous abnormalities or  destructive bone lesions.     Impression: Impression: No significant vascular abnormality in the head or the neck. Electronically Signed: Jerome Villagomez MD  3/29/2025 12:22 AM EDT  Workstation ID: [id number]    CT Angiogram Neck  Result Date: 3/29/2025  Narrative: CT ANGIOGRAM HEAD W AI ANALYSIS OF LVO, CT ANGIOGRAM NECK Date of Exam: 3/28/2025 11:17 PM EDT Indication: Neuro Deficit, acute, Stroke suspected Neuro deficit, acute stroke suspected. Comparison: None available. Technique: CTA of the head was performed after the uneventful intravenous administration of iodinated contrast. Reconstructed coronal and sagittal images were also obtained. In addition, a 3-D volume rendered image was created for interpretation. Automated exposure control and iterative  reconstruction methods were used. Findings: Aortic arch: The aortic arch is unremarkable.  There is conventional 3 vessel arch anatomy.  The right brachiocephalic and visualized bilateral subclavian arteries are within normal limits. Right carotid: The right CCA arises as expected from the brachiocephalic trunk.  The CCA follows a normal course and appears normal caliber.  The carotid bifurcation is unremarkable.  The external carotid artery and distal branches appear within normal limits.  The cervical internal carotid artery follows a normal course and appears normal caliber throughout the neck and into the head.  The intracranial ICA segments appear within normal limits.  The ophthalmic artery origin is normal.  The A1 and M1 segments appear within normal limits.  The visualized distal ANGELA and MCA branches appear patent.  There is  a patent  anterior communicating artery. There is a patent  posterior communicating artery. Left carotid: The left CCA arises as expected from the aortic arch.   The CCA follows a normal course and appears normal caliber.  The carotid bifurcation is unremarkable.  The external carotid artery and distal branches appear within normal limits.  The  cervical internal carotid artery follows a normal course and appears normal caliber throughout the neck and into the head.  The intracranial ICA segments appear within normal limits.  The ophthalmic artery origin is normal.  The A1 and M1 segments appear within normal limits.  The visualized distal ANGELA and MCA branches appear patent.  There is a patent  posterior communicating artery. Posterior circulation: Vertebral arteries arise as expected from ipsilateral subclavian arteries. The left vertebral artery is dominant. The vertebral arteries follow a normal course and appear normal caliber throughout the neck and into the head.  The V4 segments are patent.  Visualized posterior inferior cerebellar arteries are within normal limits.  The basilar  artery is normal caliber.  Superior cerebellar arteries are patent.  Bilateral P1 segments and posterior cerebral arteries appear within normal limits. Nonvascular findings: Intracranial structures appear unremarkable.  Orbits are within normal limits.  Paranasal sinuses and mastoid air cells appear well aerated.  Superficial soft tissues and underlying musculature appear within normal limits.  The lung  apices are clear.  The thyroid and salivary glands appear unremarkable.  The suprahyoid and infrahyoid spaces of the neck appear unremarkable.  There is no evidence of cervical lymphadenopathy or a neck mass.  There are no acute osseous abnormalities or  destructive bone lesions.     Impression: Impression: No significant vascular abnormality in the head or the neck. Electronically Signed: Jerome Villagomez MD  3/29/2025 12:22 AM EDT  Workstation ID: [id number]    CT CEREBRAL PERFUSION WITH & WITHOUT CONTRAST  Result Date: 3/28/2025  Narrative: CT CEREBRAL PERFUSION W WO CONTRAST Date of Exam: 3/28/2025 11:07 PM EDT Indication: Neuro deficit, acute, stroke suspected Neuro Deficit, acute, Stroke suspected.  Comparison: Noncontrast CT scan of the head obtained earlier same day Technique: Axial CT images of the brain were obtained prior to and after the administration of iodinated contrast. CT Perfusion protocol was utilized. Automated post processing was performed by RAPID software and submitted to PACS for interpretation. Automated exposure control and iterative reconstruction was utilized. Findings: Cerebral blood flow, blood volume and mean transit time maps are symmetric without large perfusion defect. CBF<30% volume: 0 mL Tmax>6sec volume: 0 mL Mismatch volume: 0 mL Mismatch ratio: None.     Impression: Impression: No significant perfusion abnormality in the brain. Electronically Signed: Jerome Villagomez MD  3/28/2025 11:30 PM EDT  Workstation ID: [id number]    XR Chest 1 View  Result Date: 3/28/2025  Narrative: XR CHEST 1 VW  Date of Exam: 3/28/2025 11:15 PM EDT Indication: Acute Stroke Protocol (Onset < 12 hrs) Comparison: Chest radiograph 4/13/2022 Findings: There are no airspace consolidations. No pleural fluid. No pneumothorax. The pulmonary vasculature appears within normal limits. The cardiac and mediastinal silhouette appear unremarkable. No acute osseous abnormality identified.     Impression: Impression: No active disease. Electronically Signed: Jerome Villagomez MD  3/28/2025 11:28 PM EDT  Workstation ID: [id number]    CT Head Without Contrast Stroke Protocol  Result Date: 3/28/2025  Narrative: CT HEAD WO CONTRAST STROKE PROTOCOL Date of Exam: 3/28/2025 10:38 PM EDT Indication: Neuro deficit, acute, stroke suspected Neuro Deficit, acute, Stroke suspected. Comparison: None available. Technique: Axial CT images were obtained of the head without contrast administration.  Reconstructed coronal and sagittal images were also obtained. Automated exposure control and iterative construction methods were used. Findings: No focal brain lesion, mass or intracranial hemorrhage is seen. The ventricles are normal in size and configuration. An air-fluid level is noted in the left maxillary sinus. Mucoperiosteal thickening is noted elsewhere in the paranasal sinuses bilaterally. No focal calvarial abnormality is seen.     Impression: Impression: No acute intracranial abnormality. Left maxillary sinus air-fluid level. Correlate clinically for acute sinusitis. Electronically Signed: Elier Licea MD  3/28/2025 10:54 PM EDT  Workstation ID: [id number]      Allergies:  is allergic to egg-derived products and fish-derived products.    Benigno  {Desc; reviewed/not reviewed:[id number]}    Discharge Medications:     Discharge Medications        Continue These Medications        Instructions Start Date   apixaban 5 MG tablet tablet  Commonly known as: Eliquis   5 mg, Oral, Every 12 Hours Scheduled, **Patient needs appointment with PCP for future refills**       atorvastatin 40 MG tablet  Commonly known as: LIPITOR   40 mg, Oral, Daily      celecoxib 200 MG capsule  Commonly known as: CeleBREX   200 mg, Oral, 2 Times Daily      citalopram 40 MG tablet  Commonly known as: CeleXA   40 mg, Oral, Daily      cyclobenzaprine 10 MG tablet  Commonly known as: FLEXERIL   10 mg, Oral, 3 Times Daily PRN, for muscle spams      D-Care Glucometer w/Device kit   1 Units, Not Applicable, Daily      glimepiride 4 MG tablet  Commonly known as: AMARYL   4 mg, Oral, Every Morning Before Breakfast      glucose blood test strip   Use as instructed      lisinopril 20 MG tablet  Commonly known as: PRINIVIL,ZESTRIL   20 mg, Oral, Daily      metFORMIN  MG 24 hr tablet  Commonly known as: GLUCOPHAGE-XR   500 mg, Oral, Daily With Breakfast      metoprolol succinate  MG 24 hr tablet  Commonly known as: TOPROL-XL   50 mg, Oral, 2 Times Daily      Mounjaro 5 MG/0.5ML solution auto-injector  Generic drug: Tirzepatide   1 syringe, Subcutaneous, Weekly      sotalol 120 MG tablet  Commonly known as: BETAPACE   120 mg, Oral, Every 12 Hours Scheduled      tadalafil 5 MG tablet  Commonly known as: CIALIS   5 mg, Oral, Daily PRN               Last Lab Results:   Lab Results (most recent)       Procedure Component Value Units Date/Time    Urine Drug Screen - Urine, Clean Catch [[id number]]  (Abnormal) Collected: 03/28/25 2311    Specimen: Urine, Clean Catch Updated: 03/29/25 0522     THC, Screen, Urine Positive     Phencyclidine (PCP), Urine Negative     Cocaine Screen, Urine Negative     Methamphetamine, Ur Negative     Opiate Screen Negative     Amphetamine Screen, Urine Negative     Benzodiazepine Screen, Urine Negative     Tricyclic Antidepressants Screen Positive     Methadone Screen, Urine Negative     Barbiturates Screen, Urine Negative     Oxycodone Screen, Urine Negative     Buprenorphine, Screen, Urine Negative    Narrative:      Cutoff For Drugs Screened:    Amphetamines               500  ng/ml  Barbiturates               200 ng/ml  Benzodiazepines            150 ng/ml  Cocaine                    150 ng/ml  Methadone                  200 ng/ml  Opiates                    100 ng/ml  Phencyclidine               25 ng/ml  THC                         50 ng/ml  Methamphetamine            500 ng/ml  Tricyclic Antidepressants  300 ng/ml  Oxycodone                  100 ng/ml  Buprenorphine               10 ng/ml    The normal value for all drugs tested is negative. This report includes unconfirmed screening results, with the cutoff values listed, to be used for medical treatment purposes only.  Unconfirmed results must not be used for non-medical purposes such as employment or legal testing.  Clinical consideration should be applied to any drug of abuse test, particularly when unconfirmed results are used.      Lipid Panel [[id number]]  (Abnormal) Collected: 03/29/25 0603    Specimen: Blood Updated: 03/29/25 0708     Total Cholesterol 101 mg/dL      Triglycerides 222 mg/dL      HDL Cholesterol 22 mg/dL      LDL Cholesterol  43 mg/dL      VLDL Cholesterol 36 mg/dL      LDL/HDL Ratio 1.57    Narrative:      Cholesterol Reference Ranges  (U.S. Department of Health and Human Services ATP III Classifications)    Desirable          <200 mg/dL  Borderline High    200-239 mg/dL  High Risk          >240 mg/dL      Triglyceride Reference Ranges  (U.S. Department of Health and Human Services ATP III Classifications)    Normal           <150 mg/dL  Borderline High  150-199 mg/dL  High             200-499 mg/dL  Very High        >500 mg/dL    HDL Reference Ranges  (U.S. Department of Health and Human Services ATP III Classifications)    Low     <40 mg/dl (major risk factor for CHD)  High    >60 mg/dl ('negative' risk factor for CHD)        LDL Reference Ranges  (U.S. Department of Health and Human Services ATP III Classifications)    Optimal          <100 mg/dL  Near Optimal     100-129 mg/dL  Borderline High  130-159  mg/dL  High             160-189 mg/dL  Very High        >189 mg/dL    LDL is calculated using the NIH LDL-C calculation.      Hemoglobin A1c [[id number]]  (Abnormal) Collected: 03/29/25 0502    Specimen: Blood Updated: 03/29/25 0531     Hemoglobin A1C 8.70 %     Narrative:      Hemoglobin A1C Ranges:    Increased Risk for Diabetes  5.7% to 6.4%  Diabetes                     >= 6.5%  Diabetic Goal                < 7.0%    High Sensitivity Troponin T 1Hr [[id number]]  (Normal) Collected: 03/29/25 0026    Specimen: Blood Updated: 03/29/25 0046     HS Troponin T 7 ng/L      Troponin T Numeric Delta 0 ng/L     Narrative:      High Sensitive Troponin T Reference Range:  <14.0 ng/L- Negative Female for AMI  <22.0 ng/L- Negative Male for AMI  >=14 - Abnormal Female indicating possible myocardial injury.  >=22 - Abnormal Male indicating possible myocardial injury.   Clinicians would have to utilize clinical acumen, EKG, Troponin, and serial changes to determine if it is an Acute Myocardial Infarction or myocardial injury due to an underlying chronic condition.         aPTT [[id number]]  (Normal) Collected: 03/28/25 2252    Specimen: Blood Updated: 03/28/25 2341     PTT 31.2 seconds     Narrative:      PTT = The equivalent PTT values for the therapeutic range of heparin levels at 0.1 to 0.7 U/ml are 53 to 110 seconds.      Protime-INR [[id number]]  (Normal) Collected: 03/28/25 2252    Specimen: Blood Updated: 03/28/25 2341     Protime 13.5 Seconds      INR 0.99    Narrative:      Therapeutic Ranges for INR: 2.0-3.0 (PT 20-30)                              2.5-3.5 (PT 25-34)    Urinalysis With Microscopic If Indicated (No Culture) - Urine, Clean Catch [[id number]]  (Abnormal) Collected: 03/28/25 2318    Specimen: Urine, Clean Catch Updated: 03/28/25 2337     Color, UA Yellow     Appearance, UA Clear     pH, UA 5.5     Specific Gravity, UA 1.020     Glucose, UA >=1000 mg/dL (3+)     Ketones, UA Trace     Bilirubin, UA Negative      Blood, UA Negative     Protein, UA Negative     Leuk Esterase, UA Negative     Nitrite, UA Negative     Urobilinogen, UA 1.0 E.U./dL    Narrative:      Urine microscopic not indicated.    Comprehensive Metabolic Panel [[id number]]  (Abnormal) Collected: 03/28/25 2252    Specimen: Blood Updated: 03/28/25 2316     Glucose 376 mg/dL      BUN 14 mg/dL      Creatinine 0.99 mg/dL      Sodium 131 mmol/L      Potassium 4.3 mmol/L      Chloride 94 mmol/L      CO2 26.4 mmol/L      Calcium 9.4 mg/dL      Total Protein 6.8 g/dL      Albumin 3.9 g/dL      ALT (SGPT) 32 U/L      AST (SGOT) 22 U/L      Alkaline Phosphatase 95 U/L      Total Bilirubin 0.4 mg/dL      Globulin 2.9 gm/dL      A/G Ratio 1.3 g/dL      BUN/Creatinine Ratio 14.1     Anion Gap 10.6 mmol/L      eGFR 94.0 mL/min/1.73     Narrative:      GFR Categories in Chronic Kidney Disease (CKD)      GFR Category          GFR (mL/min/1.73)    Interpretation  G1                     90 or greater         Normal or high (1)  G2                      60-89                Mild decrease (1)  G3a                   45-59                Mild to moderate decrease  G3b                   30-44                Moderate to severe decrease  G4                    15-29                Severe decrease  G5                    14 or less           Kidney failure          (1)In the absence of evidence of kidney disease, neither GFR category G1 or G2 fulfill the criteria for CKD.    eGFR calculation 2021 CKD-EPI creatinine equation, which does not include race as a factor    High Sensitivity Troponin T [[id number]]  (Normal) Collected: 03/28/25 2252    Specimen: Blood Updated: 03/28/25 2316     HS Troponin T 7 ng/L     Narrative:      High Sensitive Troponin T Reference Range:  <14.0 ng/L- Negative Female for AMI  <22.0 ng/L- Negative Male for AMI  >=14 - Abnormal Female indicating possible myocardial injury.  >=22 - Abnormal Male indicating possible myocardial injury.   Clinicians would have to  utilize clinical acumen, EKG, Troponin, and serial changes to determine if it is an Acute Myocardial Infarction or myocardial injury due to an underlying chronic condition.         [place] Draw [[id number]] Collected: 03/28/25 2252    Specimen: Blood Updated: 03/28/25 2300    Narrative:      The following orders were created for panel order [place] Draw.  Procedure                               Abnormality         Status                     ---------                               -----------         ------                     Green Top (Gel)[[id number]]                                  Final result               Lavender Top[[id number]]                                     Final result               Gold Top - SST[[id number]]                                   Final result               Light Blue Top[[id number]]                                   Final result                 Please view results for these tests on the individual orders.    CBC & Differential [[id number]]  (Abnormal) Collected: 03/28/25 2252    Specimen: Blood Updated: 03/28/25 2300    Narrative:      The following orders were created for panel order CBC & Differential.  Procedure                               Abnormality         Status                     ---------                               -----------         ------                     CBC Auto Differential[[id number]]        Abnormal            Final result                 Please view results for these tests on the individual orders.    Green Top (Gel) [[id number]] Collected: 03/28/25 2252    Specimen: Blood Updated: 03/28/25 2300     Extra Tube Hold for add-ons.     Comment: Auto resulted.       Lavender Top [[id number]] Collected: 03/28/25 2252    Specimen: Blood Updated: 03/28/25 2300     Extra Tube hold for add-on     Comment: Auto resulted       Gold Top - SST [[id number]] Collected: 03/28/25 2252    Specimen: Blood Updated: 03/28/25 2300     Extra Tube Hold for add-ons.     Comment: Auto resulted.        Light Blue Top [[id number]] Collected: 03/28/25 2252    Specimen: Blood Updated: 03/28/25 2300     Extra Tube Hold for add-ons.     Comment: Auto resulted       CBC Auto Differential [[id number]]  (Abnormal) Collected: 03/28/25 2252    Specimen: Blood Updated: 03/28/25 2300     WBC 5.26 10*3/mm3      RBC 5.79 10*6/mm3      Hemoglobin 15.5 g/dL      Hematocrit 46.9 %      MCV 81.0 fL      MCH 26.8 pg      MCHC 33.0 g/dL      RDW 14.8 %      RDW-SD 43.0 fl      MPV 10.7 fL      Platelets 174 10*3/mm3      Neutrophil % 54.9 %      Lymphocyte % 23.0 %      Monocyte % 12.5 %      Eosinophil % 8.4 %      Basophil % 0.4 %      Immature Grans % 0.8 %      Neutrophils, Absolute 2.89 10*3/mm3      Lymphocytes, Absolute 1.21 10*3/mm3      Monocytes, Absolute 0.66 10*3/mm3      Eosinophils, Absolute 0.44 10*3/mm3      Basophils, Absolute 0.02 10*3/mm3      Immature Grans, Absolute 0.04 10*3/mm3      nRBC 0.0 /100 WBC     POC Glucose Once [[id number]]  (Abnormal) Collected: 03/28/25 2241    Specimen: Blood Updated: 03/28/25 2247     Glucose 346 mg/dL           Imaging Results (Most Recent)       Procedure Component Value Units Date/Time    MRI Brain Without Contrast [[id number]] Collected: 03/29/25 1003     Updated: 03/29/25 1010    Narrative:      MRI BRAIN WO CONTRAST    Date of Exam: 3/29/2025 9:12 AM EDT    Indication: word findings difficulties.     Comparison: None available.    Technique:  Routine multiplanar/multisequence sequence images of the brain were obtained without contrast administration.      FINDINGS:  No abnormal diffusion restriction is observed to indicate acute or subacute focal ischemia. Minimal white matter signal changes are noted likely related to early age-related changes. There is no evidence for abnormal cerebral edema. There is no mass   effect or midline shift. The ventricular system is nondilated. The basilar cisterns are patent. The midline structures are unremarkable. No significant extra-axial  fluid collections are identified. Changes of chronic sinusitis are noted. There is an   air-fluid level in the left maxillary sinus suggesting acute sinusitis.      Impression:      1.No evidence for acute or subacute focal ischemia.  2.Minimal white matter signal changes are noted likely age-related changes.  3.Evidence for acute on chronic sinusitis.      Electronically Signed: Alex Cline MD    3/29/2025 10:07 AM EDT    Workstation ID: [id number]    CT Angiogram Head w AI Analysis of LVO [[id number]] Collected: 03/29/25 0020     Updated: 03/29/25 0026    Narrative:      CT ANGIOGRAM HEAD W AI ANALYSIS OF LVO, CT ANGIOGRAM NECK    Date of Exam: 3/28/2025 11:17 PM EDT    Indication: Neuro Deficit, acute, Stroke suspected  Neuro deficit, acute stroke suspected.    Comparison: None available.    Technique: CTA of the head was performed after the uneventful intravenous administration of iodinated contrast. Reconstructed coronal and sagittal images were also obtained. In addition, a 3-D volume rendered image was created for interpretation.   Automated exposure control and iterative reconstruction methods were used.      Findings:  Aortic arch: The aortic arch is unremarkable.  There is conventional 3 vessel arch anatomy.  The right brachiocephalic and visualized bilateral subclavian arteries are within normal limits.    Right carotid: The right CCA arises as expected from the brachiocephalic trunk.  The CCA follows a normal course and appears normal caliber.  The carotid bifurcation is unremarkable.  The external carotid artery and distal branches appear within normal   limits.  The cervical internal carotid artery follows a normal course and appears normal caliber throughout the neck and into the head.  The intracranial ICA segments appear within normal limits.  The ophthalmic artery origin is normal.  The A1 and M1   segments appear within normal limits.  The visualized distal ANGELA and MCA branches appear patent.   There is  a patent  anterior communicating artery. There is a patent  posterior communicating artery.    Left carotid: The left CCA arises as expected from the aortic arch.   The CCA follows a normal course and appears normal caliber.  The carotid bifurcation is unremarkable.  The external carotid artery and distal branches appear within normal limits.  The   cervical internal carotid artery follows a normal course and appears normal caliber throughout the neck and into the head.  The intracranial ICA segments appear within normal limits.  The ophthalmic artery origin is normal.  The A1 and M1 segments   appear within normal limits.  The visualized distal ANGELA and MCA branches appear patent.  There is a patent  posterior communicating artery.    Posterior circulation: Vertebral arteries arise as expected from ipsilateral subclavian arteries. The left vertebral artery is dominant. The vertebral arteries follow a normal course and appear normal caliber throughout the neck and into the head.  The   V4 segments are patent.  Visualized posterior inferior cerebellar arteries are within normal limits.  The basilar artery is normal caliber.  Superior cerebellar arteries are patent.  Bilateral P1 segments and posterior cerebral arteries appear within   normal limits.      Nonvascular findings: Intracranial structures appear unremarkable.  Orbits are within normal limits.  Paranasal sinuses and mastoid air cells appear well aerated.  Superficial soft tissues and underlying musculature appear within normal limits.  The lung   apices are clear.  The thyroid and salivary glands appear unremarkable.  The suprahyoid and infrahyoid spaces of the neck appear unremarkable.  There is no evidence of cervical lymphadenopathy or a neck mass.  There are no acute osseous abnormalities or   destructive bone lesions.      Impression:      Impression:  No significant vascular abnormality in the head or the neck.            Electronically  Signed: Jerome Villagomez MD    3/29/2025 12:22 AM EDT    Workstation ID: [id number]    CT Angiogram Neck [[id number]] Collected: 03/29/25 0020     Updated: 03/29/25 0026    Narrative:      CT ANGIOGRAM HEAD W AI ANALYSIS OF LVO, CT ANGIOGRAM NECK    Date of Exam: 3/28/2025 11:17 PM EDT    Indication: Neuro Deficit, acute, Stroke suspected  Neuro deficit, acute stroke suspected.    Comparison: None available.    Technique: CTA of the head was performed after the uneventful intravenous administration of iodinated contrast. Reconstructed coronal and sagittal images were also obtained. In addition, a 3-D volume rendered image was created for interpretation.   Automated exposure control and iterative reconstruction methods were used.      Findings:  Aortic arch: The aortic arch is unremarkable.  There is conventional 3 vessel arch anatomy.  The right brachiocephalic and visualized bilateral subclavian arteries are within normal limits.    Right carotid: The right CCA arises as expected from the brachiocephalic trunk.  The CCA follows a normal course and appears normal caliber.  The carotid bifurcation is unremarkable.  The external carotid artery and distal branches appear within normal   limits.  The cervical internal carotid artery follows a normal course and appears normal caliber throughout the neck and into the head.  The intracranial ICA segments appear within normal limits.  The ophthalmic artery origin is normal.  The A1 and M1   segments appear within normal limits.  The visualized distal ANGELA and MCA branches appear patent.  There is  a patent  anterior communicating artery. There is a patent  posterior communicating artery.    Left carotid: The left CCA arises as expected from the aortic arch.   The CCA follows a normal course and appears normal caliber.  The carotid bifurcation is unremarkable.  The external carotid artery and distal branches appear within normal limits.  The   cervical internal carotid artery  follows a normal course and appears normal caliber throughout the neck and into the head.  The intracranial ICA segments appear within normal limits.  The ophthalmic artery origin is normal.  The A1 and M1 segments   appear within normal limits.  The visualized distal ANGELA and MCA branches appear patent.  There is a patent  posterior communicating artery.    Posterior circulation: Vertebral arteries arise as expected from ipsilateral subclavian arteries. The left vertebral artery is dominant. The vertebral arteries follow a normal course and appear normal caliber throughout the neck and into the head.  The   V4 segments are patent.  Visualized posterior inferior cerebellar arteries are within normal limits.  The basilar artery is normal caliber.  Superior cerebellar arteries are patent.  Bilateral P1 segments and posterior cerebral arteries appear within   normal limits.      Nonvascular findings: Intracranial structures appear unremarkable.  Orbits are within normal limits.  Paranasal sinuses and mastoid air cells appear well aerated.  Superficial soft tissues and underlying musculature appear within normal limits.  The lung   apices are clear.  The thyroid and salivary glands appear unremarkable.  The suprahyoid and infrahyoid spaces of the neck appear unremarkable.  There is no evidence of cervical lymphadenopathy or a neck mass.  There are no acute osseous abnormalities or   destructive bone lesions.      Impression:      Impression:  No significant vascular abnormality in the head or the neck.            Electronically Signed: Jerome Villagomez MD    3/29/2025 12:22 AM EDT    Workstation ID: [id number]    CT CEREBRAL PERFUSION WITH & WITHOUT CONTRAST [[id number]] Collected: 03/28/25 2328     Updated: 03/28/25 2333    Narrative:      CT CEREBRAL PERFUSION W WO CONTRAST    Date of Exam: 3/28/2025 11:07 PM EDT    Indication: Neuro deficit, acute, stroke suspected  Neuro Deficit, acute, Stroke suspected.     Comparison:  Noncontrast CT scan of the head obtained earlier same day    Technique: Axial CT images of the brain were obtained prior to and after the administration of iodinated contrast. CT Perfusion protocol was utilized. Automated post processing was performed by RAPID software and submitted to PACS for interpretation.   Automated exposure control and iterative reconstruction was utilized.      Findings:  Cerebral blood flow, blood volume and mean transit time maps are symmetric without large perfusion defect.    CBF<30% volume: 0 mL  Tmax>6sec volume: 0 mL  Mismatch volume: 0 mL  Mismatch ratio: None.      Impression:      Impression:  No significant perfusion abnormality in the brain.            Electronically Signed: Jerome Villagomez MD    3/28/2025 11:30 PM EDT    Workstation ID: [id number]    XR Chest 1 View [[id number]] Collected: 03/28/25 2327     Updated: 03/28/25 2331    Narrative:      XR CHEST 1 VW    Date of Exam: 3/28/2025 11:15 PM EDT    Indication: Acute Stroke Protocol (Onset < 12 hrs)    Comparison: Chest radiograph 4/13/2022    Findings:  There are no airspace consolidations. No pleural fluid. No pneumothorax. The pulmonary vasculature appears within normal limits. The cardiac and mediastinal silhouette appear unremarkable. No acute osseous abnormality identified.      Impression:      Impression:  No active disease.          Electronically Signed: Jerome Villagomez MD    3/28/2025 11:28 PM EDT    Workstation ID: [id number]    CT Head Without Contrast Stroke Protocol [[id number]] Collected: 03/28/25 2251     Updated: 03/28/25 2257    Narrative:      CT HEAD WO CONTRAST STROKE PROTOCOL    Date of Exam: 3/28/2025 10:38 PM EDT    Indication: Neuro deficit, acute, stroke suspected  Neuro Deficit, acute, Stroke suspected.    Comparison: None available.    Technique: Axial CT images were obtained of the head without contrast administration.  Reconstructed coronal and sagittal images were also obtained. Automated exposure  control and iterative construction methods were used.      Findings:  No focal brain lesion, mass or intracranial hemorrhage is seen. The ventricles are normal in size and configuration.    An air-fluid level is noted in the left maxillary sinus. Mucoperiosteal thickening is noted elsewhere in the paranasal sinuses bilaterally. No focal calvarial abnormality is seen.      Impression:      Impression:  No acute intracranial abnormality.  Left maxillary sinus air-fluid level. Correlate clinically for acute sinusitis.        Electronically Signed: Elier Licea MD    3/28/2025 10:54 PM EDT    Workstation ID: [id number]            PROCEDURES      Condition on Discharge:  ***    Physical Exam at Discharge  Vital Signs  Temp:  [97.3 °F (36.3 °C)-98 °F (36.7 °C)] 97.5 °F (36.4 °C)  Heart Rate:  [] 93  Resp:  [16-18] 18  BP: (127-173)/() 133/82    Physical Exam:  Physical Exam   Constitutional: Patient appears well-developed and well-nourished and in no acute distress   HEENT:   Head: Normocephalic and atraumatic.   Eyes:  Pupils are equal, round, and reactive to light. EOM are intact. Sclera are anicteric and non-injected.  Mouth and Throat: Patient has moist mucous membranes. Oropharynx is clear of any erythema or exudate.     Neck: Neck supple. No JVD present. No thyromegaly present. No lymphadenopathy present.  Cardiovascular: Regular rate, regular rhythm, S1 normal and S2 normal.  Exam reveals no gallop and no friction rub.  No murmur heard.  Pulmonary/Chest: Lungs are clear to auscultation bilaterally. No respiratory distress. No wheezes. No rhonchi. No rales.   Abdominal: Soft. Bowel sounds are normal. No distension and no mass. There is no hepatosplenomegaly. There is no tenderness.   Musculoskeletal: Normal Muscle tone  Extremities: No edema. Pulses are palpable in all 4 extremities.  Neurological: Patient is alert and oriented to person, place, and time. Cranial nerves II-XII are grossly intact with no  focal deficits.  Skin: Skin is warm. No rash noted. Nails show no clubbing.  No cyanosis or erythema.    Discharge Disposition  ***    Visiting Nurse:    {YES/NO:200010}    Home PT/OT:  {YES/NO:200010}    Home Safety Evaluation:  {YES/NO:200010}    DME  ***    Discharge Diet:      Dietary Orders (From admission, onward)       Start     Ordered    03/29/25 0525  Diet: Diabetic; Consistent Carbohydrate; Fluid Consistency: Thin (IDDSI 0)  Diet Effective Now        References:    Diet Order Definitions   Question Answer Comment   Diets: Diabetic    Diabetic Diet: Consistent Carbohydrate    Fluid Consistency: Thin (IDDSI 0)        03/29/25 0524                    Activity at Discharge:  ***    Pre-discharge education  {Discharge Education:42893}      Follow-up Appointments  Future Appointments   Date Time Provider Department Center   12/12/2025 10:40 AM Epi Sanders MD [facility] None     Additional Instructions for the Follow-ups that You Need to Schedule       Discharge Follow-up with PCP   As directed       Currently Documented PCP:    Quinn Stoner DO    PCP Phone Number:    [phone]     Follow Up Details: Next Week                Test Results Pending at Discharge       Luis Carlos Combs MD  03/29/25  12:14 EDT    Time: {Time spent:933581046} (if over 30 minutes give explanation as to why it took greater than 30 minutes)                   Fluid Consistency: Thin (IDDSI 0)        03/29/25 0524                    Activity at Discharge:  As tolerated      Follow-up Appointments  Future Appointments   Date Time Provider Department Center   12/12/2025 10:40 AM Epi Sanders MD [facility] None     Additional Instructions for the Follow-ups that You Need to Schedule       Discharge Follow-up with PCP   As directed       Currently Documented PCP:    Quinn Stoner DO    PCP Phone Number:    [phone]     Follow Up Details: Next Week                Test Results Pending at Discharge  None     Luis Carlos Combs MD  03/29/25  12:14 EDT    Time: <30 minutes

## 2025-03-31 ENCOUNTER — TELEPHONE (OUTPATIENT)
Age: 50
End: 2025-03-31
Payer: COMMERCIAL

## 2025-03-31 ENCOUNTER — TRANSITIONAL CARE MANAGEMENT TELEPHONE ENCOUNTER (OUTPATIENT)
Dept: CALL CENTER | Facility: HOSPITAL | Age: 50
End: 2025-03-31
Payer: COMMERCIAL

## 2025-03-31 DIAGNOSIS — R29.90 STROKE-LIKE SYMPTOMS: Primary | ICD-10-CM

## 2025-03-31 DIAGNOSIS — I48.0 PAROXYSMAL ATRIAL FIBRILLATION: ICD-10-CM

## 2025-03-31 NOTE — TELEPHONE ENCOUNTER
Left voicemail informing patient that order has been placed and message sent to get him scheduled.

## 2025-03-31 NOTE — OUTREACH NOTE
Call Center TCM Note      Flowsheet Row Responses   [facility] patient discharged from? [place]   Does the patient have one of the following disease processes/diagnoses(primary or secondary)? Other   TCM attempt successful? Yes   Call start time 1045   Call end time 1046   Discharge diagnosis Stroke-like symptoms   Medication comments No medication changes   Comments Hospital follow up 4/8   Does the patient have an appointment with their PCP within 7-14 days of discharge? Yes   Psychosocial issues? No   Did the patient receive a copy of their discharge instructions? Yes   Nursing interventions Reviewed instructions with patient   What is the patient's perception of their health status since discharge? Improving   Is the patient/caregiver able to teach back signs and symptoms related to disease process for when to call PCP? Yes   Is the patient/caregiver able to teach back signs and symptoms related to disease process for when to call 911? Yes   Is the patient/caregiver able to teach back the hierarchy of who to call/visit for symptoms/problems? PCP, Specialist, Home health nurse, Urgent Care, ED, 911 Yes   TCM call completed? Yes   Wrap up additional comments Patient reports doing well. No questions or concerns at this time.   Call end time 1046   Would this patient benefit from a Referral to Amb Social Work? No   Is the patient interested in additional calls from an ambulatory ? No            HUBER HERNANDEZ - Registered Nurse    3/31/2025, 10:46 EDT

## 2025-03-31 NOTE — TELEPHONE ENCOUNTER
Patient's wife, Suzy, calling today to request order for Echo.    Patient presented to ED in [place] on 3/28 with stroke-like symptoms, Echo recommended but there was no one there over the weekend to perform.    Patient's wife asking for order to get Echo done ASAP, per discharge recommendations from [place].    Please review and place order, if agreeable.    Thanks!

## 2025-04-08 ENCOUNTER — OFFICE VISIT (OUTPATIENT)
Dept: FAMILY MEDICINE CLINIC | Facility: CLINIC | Age: 50
End: 2025-04-08
Payer: COMMERCIAL

## 2025-04-08 VITALS
BODY MASS INDEX: 44.1 KG/M2 | DIASTOLIC BLOOD PRESSURE: 82 MMHG | OXYGEN SATURATION: 97 % | WEIGHT: 315 LBS | HEIGHT: 71 IN | SYSTOLIC BLOOD PRESSURE: 148 MMHG | TEMPERATURE: 97.5 F | HEART RATE: 75 BPM

## 2025-04-08 DIAGNOSIS — N52.8 OTHER MALE ERECTILE DYSFUNCTION: ICD-10-CM

## 2025-04-08 DIAGNOSIS — E66.01 MORBID EXOGENOUS OBESITY: ICD-10-CM

## 2025-04-08 DIAGNOSIS — T78.2XXD ANAPHYLAXIS, SUBSEQUENT ENCOUNTER: Primary | ICD-10-CM

## 2025-04-08 DIAGNOSIS — E11.65 TYPE 2 DIABETES MELLITUS WITH HYPERGLYCEMIA, WITHOUT LONG-TERM CURRENT USE OF INSULIN: ICD-10-CM

## 2025-04-08 DIAGNOSIS — E11.65 UNCONTROLLED TYPE 2 DIABETES MELLITUS WITH HYPERGLYCEMIA: ICD-10-CM

## 2025-04-08 DIAGNOSIS — I48.91 ATRIAL FIBRILLATION WITH RVR: ICD-10-CM

## 2025-04-08 DIAGNOSIS — E78.2 MIXED HYPERLIPIDEMIA: ICD-10-CM

## 2025-04-08 DIAGNOSIS — I48.0 PAROXYSMAL ATRIAL FIBRILLATION: ICD-10-CM

## 2025-04-08 DIAGNOSIS — F41.8 DEPRESSION WITH ANXIETY: ICD-10-CM

## 2025-04-08 DIAGNOSIS — E55.9 VITAMIN D DEFICIENCY: ICD-10-CM

## 2025-04-08 DIAGNOSIS — I10 BENIGN ESSENTIAL HTN: ICD-10-CM

## 2025-04-08 RX ORDER — TADALAFIL 5 MG/1
5 TABLET ORAL DAILY PRN
Qty: 30 TABLET | Refills: 5 | Status: SHIPPED | OUTPATIENT
Start: 2025-04-08

## 2025-04-09 PROBLEM — E66.01 MORBID EXOGENOUS OBESITY: Status: ACTIVE | Noted: 2022-04-07

## 2025-04-09 RX ORDER — HYDROCHLOROTHIAZIDE 12.5 MG/1
CAPSULE ORAL
Qty: 2 EACH | Refills: 5 | Status: SHIPPED | OUTPATIENT
Start: 2025-04-09

## 2025-04-09 RX ORDER — GLIMEPIRIDE 4 MG/1
TABLET ORAL
Qty: 180 TABLET | Refills: 1 | Status: SHIPPED | OUTPATIENT
Start: 2025-04-09

## 2025-04-09 RX ORDER — ATORVASTATIN CALCIUM 40 MG/1
40 TABLET, FILM COATED ORAL DAILY
Qty: 90 TABLET | Refills: 1 | Status: SHIPPED | OUTPATIENT
Start: 2025-04-09

## 2025-04-09 NOTE — PROGRESS NOTES
Subjective   Santiago Zamora is a 50 y.o. male with   Chief Complaint   Patient presents with    Hospital Follow Up Visit     Went in through ER - Admitted for stroke like symptoms - [facility] - DC 3/29/25 - may have been a medication reaction   .    History of Present Illness   50-year-old white male with multiple medical issues here for further medical management.  He was recently hospitalized at [facility] for initially what was considered strokelike symptoms.  He had had a cold and the night before the admission had taken some NyQuil.  He woke up with his tongue swollen and could barely speak.  He was disoriented and cognitively impaired.  He was trying to communicate with his wife who had a hard time understanding what the story was until she realized that something wrong was happening and he was transported from the home to the ER via EMS.  While hospitalized he underwent CT scans of the head and neck as well as MRI of the brain and transthoracic echocardiogram of his heart.  Cardiology was consulted as well as neurology.  He responded quickly and was in full recovery upon discharge.  He has had no similar event.  Past medical history includes hypertension, hyperlipidemia, CHF as well as paroxysmal atrial fibrillation.  He has type II non-insulin-dependent diabetes mellitus.  He he had been started on Mounjaro and self discontinued this secondary to significant nausea/vomiting and diarrhea.  Other medications include metformin ER and glimepiride at 4 mg daily.  He continues to use atorvastatin, Eliquis, citalopram as well as lisinopril.  Sotalol is also used as well as tadalafil.  The following portions of the patient's history were reviewed and updated as appropriate: allergies, current medications, past family history, past medical history, past social history, past surgical history and problem list.    Review of Systems   Cardiovascular:         Hypertension, hyperlipidemia, CHF, paroxysmal atrial  fibrillation   Endocrine:        Morbid exogenous obesity, vitamin D deficiency, type II non-insulin-dependent diabetes mellitus   Allergic/Immunologic:        Medication allergic reaction   Psychiatric/Behavioral:  Positive for dysphoric mood. The patient is nervous/anxious.        Objective     Vitals:    04/08/25 1326   BP: 148/82   Pulse: 75   Temp: 97.5 °F (36.4 °C)   SpO2: 97%       Recent Results (from the past 4 weeks)   POC Glucose Once    Collection Time: 03/28/25 10:41 PM    Specimen: Blood   Result Value Ref Range    Glucose 346 (H) 70 - 130 mg/dL   Comprehensive Metabolic Panel    Collection Time: 03/28/25 10:52 PM    Specimen: Blood   Result Value Ref Range    Glucose 376 (H) 65 - 99 mg/dL    BUN 14 6 - 20 mg/dL    Creatinine 0.99 0.76 - 1.27 mg/dL    Sodium 131 (L) 136 - 145 mmol/L    Potassium 4.3 3.5 - 5.2 mmol/L    Chloride 94 (L) 98 - 107 mmol/L    CO2 26.4 22.0 - 29.0 mmol/L    Calcium 9.4 8.6 - 10.5 mg/dL    Total Protein 6.8 6.0 - 8.5 g/dL    Albumin 3.9 3.5 - 5.2 g/dL    ALT (SGPT) 32 1 - 41 U/L    AST (SGOT) 22 1 - 40 U/L    Alkaline Phosphatase 95 39 - 117 U/L    Total Bilirubin 0.4 0.0 - 1.2 mg/dL    Globulin 2.9 gm/dL    A/G Ratio 1.3 g/dL    BUN/Creatinine Ratio 14.1 7.0 - 25.0    Anion Gap 10.6 5.0 - 15.0 mmol/L    eGFR 94.0 >60.0 mL/min/1.73   Protime-INR    Collection Time: 03/28/25 10:52 PM    Specimen: Blood   Result Value Ref Range    Protime 13.5 12.1 - 15.0 Seconds    INR 0.99 0.90 - 1.10   aPTT    Collection Time: 03/28/25 10:52 PM    Specimen: Blood   Result Value Ref Range    PTT 31.2 24.3 - 38.1 seconds   Type & Screen    Collection Time: 03/28/25 10:52 PM    Specimen: Blood   Result Value Ref Range    ABO Type A     RH type Positive     Antibody Screen Negative     T&S Expiration Date 3/31/2025 11:59:59 PM    Green Top (Gel)    Collection Time: 03/28/25 10:52 PM   Result Value Ref Range    Extra Tube Hold for add-ons.    Lavender Top    Collection Time: 03/28/25 10:52 PM    Result Value Ref Range    Extra Tube hold for add-on    Gold Top - SST    Collection Time: 03/28/25 10:52 PM   Result Value Ref Range    Extra Tube Hold for add-ons.    Light Blue Top    Collection Time: 03/28/25 10:52 PM   Result Value Ref Range    Extra Tube Hold for add-ons.    CBC Auto Differential    Collection Time: 03/28/25 10:52 PM    Specimen: Blood   Result Value Ref Range    WBC 5.26 3.40 - 10.80 10*3/mm3    RBC 5.79 4.14 - 5.80 10*6/mm3    Hemoglobin 15.5 13.0 - 17.7 g/dL    Hematocrit 46.9 37.5 - 51.0 %    MCV 81.0 79.0 - 97.0 fL    MCH 26.8 26.6 - 33.0 pg    MCHC 33.0 31.5 - 35.7 g/dL    RDW 14.8 12.3 - 15.4 %    RDW-SD 43.0 37.0 - 54.0 fl    MPV 10.7 6.0 - 12.0 fL    Platelets 174 140 - 450 10*3/mm3    Neutrophil % 54.9 42.7 - 76.0 %    Lymphocyte % 23.0 19.6 - 45.3 %    Monocyte % 12.5 (H) 5.0 - 12.0 %    Eosinophil % 8.4 (H) 0.3 - 6.2 %    Basophil % 0.4 0.0 - 1.5 %    Immature Grans % 0.8 (H) 0.0 - 0.5 %    Neutrophils, Absolute 2.89 1.70 - 7.00 10*3/mm3    Lymphocytes, Absolute 1.21 0.70 - 3.10 10*3/mm3    Monocytes, Absolute 0.66 0.10 - 0.90 10*3/mm3    Eosinophils, Absolute 0.44 (H) 0.00 - 0.40 10*3/mm3    Basophils, Absolute 0.02 0.00 - 0.20 10*3/mm3    Immature Grans, Absolute 0.04 0.00 - 0.05 10*3/mm3    nRBC 0.0 0.0 - 0.2 /100 WBC   High Sensitivity Troponin T    Collection Time: 03/28/25 10:52 PM    Specimen: Blood   Result Value Ref Range    HS Troponin T 7 <22 ng/L   Urinalysis With Microscopic If Indicated (No Culture) - Urine, Clean Catch    Collection Time: 03/28/25 11:18 PM    Specimen: Urine, Clean Catch   Result Value Ref Range    Color, UA Yellow Yellow, Straw    Appearance, UA Clear Clear    pH, UA 5.5 4.5 - 8.0    Specific Gravity, UA 1.020 1.003 - 1.030    Glucose, UA >=1000 mg/dL (3+) (A) Negative    Ketones, UA Trace (A) Negative    Bilirubin, UA Negative Negative    Blood, UA Negative Negative    Protein, UA Negative Negative    Leuk Esterase, UA Negative Negative     Nitrite, UA Negative Negative    Urobilinogen, UA 1.0 E.U./dL 0.2 - 1.0 E.U./dL   Urine Drug Screen - Urine, Clean Catch    Collection Time: 03/28/25 11:18 PM    Specimen: Urine, Clean Catch   Result Value Ref Range    THC, Screen, Urine Positive (A) Negative    Phencyclidine (PCP), Urine Negative Negative    Cocaine Screen, Urine Negative Negative    Methamphetamine, Ur Negative Negative    Opiate Screen Negative Negative    Amphetamine Screen, Urine Negative Negative    Benzodiazepine Screen, Urine Negative Negative    Tricyclic Antidepressants Screen Positive (A) Negative    Methadone Screen, Urine Negative Negative    Barbiturates Screen, Urine Negative Negative    Oxycodone Screen, Urine Negative Negative    Buprenorphine, Screen, Urine Negative Negative   ECG 12 Lead ED Triage Standing Order; Acute Stroke (Onset <24 hrs)    Collection Time: 03/28/25 11:18 PM   Result Value Ref Range    QT Interval 352 ms    QTC Interval 454 ms   High Sensitivity Troponin T 1Hr    Collection Time: 03/29/25 12:26 AM    Specimen: Blood   Result Value Ref Range    HS Troponin T 7 <22 ng/L    Troponin T Numeric Delta 0 Abnormal if >/=3 ng/L   Hemoglobin A1c    Collection Time: 03/29/25  5:02 AM    Specimen: Blood   Result Value Ref Range    Hemoglobin A1C 8.70 (H) 4.80 - 5.60 %   Lipid Panel    Collection Time: 03/29/25  6:03 AM    Specimen: Blood   Result Value Ref Range    Total Cholesterol 101 0 - 200 mg/dL    Triglycerides 222 (H) 0 - 150 mg/dL    HDL Cholesterol 22 (L) 40 - 60 mg/dL    LDL Cholesterol  43 0 - 100 mg/dL    VLDL Cholesterol 36 5 - 40 mg/dL    LDL/HDL Ratio 1.57        Physical Exam  Vitals and nursing note reviewed.   Constitutional:       Appearance: Normal appearance. He is well-developed and well-groomed. He is morbidly obese.      Comments: Morbid exogenous obesity with a BMI of 44-14 pound weight loss since last year.   HENT:      Head: Normocephalic and atraumatic.   Neck:      Thyroid: No thyroid mass or  thyromegaly.      Vascular: Normal carotid pulses. No carotid bruit.      Trachea: Trachea and phonation normal.   Cardiovascular:      Rate and Rhythm: Normal rate and regular rhythm.      Heart sounds: Normal heart sounds. No murmur heard.     No friction rub. No gallop.   Pulmonary:      Effort: Pulmonary effort is normal. No respiratory distress.      Breath sounds: Normal breath sounds. No decreased breath sounds, wheezing, rhonchi or rales.   Musculoskeletal:      Cervical back: Neck supple.   Lymphadenopathy:      Cervical: No cervical adenopathy.   Skin:     General: Skin is warm and dry.      Findings: No rash.   Neurological:      Mental Status: He is alert and oriented to person, place, and time.   Psychiatric:         Attention and Perception: Attention and perception normal.         Mood and Affect: Mood and affect normal.         Speech: Speech normal.         Behavior: Behavior normal. Behavior is cooperative.         Thought Content: Thought content normal.         Cognition and Memory: Cognition and memory normal.         Judgment: Judgment normal.         Assessment & Plan   Diagnoses and all orders for this visit:    1. Anaphylaxis, subsequent encounter (Primary)    2. Type 2 diabetes mellitus with hyperglycemia, without long-term current use of insulin  -     empagliflozin (Jardiance) 25 MG tablet tablet; Take 1 tablet by mouth Daily.  Dispense: 30 tablet; Refill: 5  -     Continuous Glucose Sensor (FreeStyle Urszula 3 Plus Sensor); Use Every 15 (Fifteen) Days.  Dispense: 2 each; Refill: 5    3. Morbid exogenous obesity    4. Vitamin D deficiency    5. Paroxysmal atrial fibrillation    6. Mixed hyperlipidemia  -     atorvastatin (LIPITOR) 40 MG tablet; Take 1 tablet by mouth Daily.  Dispense: 90 tablet; Refill: 1    7. Benign essential HTN    8. Depression with anxiety    9. Uncontrolled type 2 diabetes mellitus with hyperglycemia  -     glimepiride (AMARYL) 4 MG tablet; 2 p.o. every morning   Dispense: 180 tablet; Refill: 1    10. Atrial fibrillation with RVR  -     apixaban (Eliquis) 5 MG tablet tablet; Take 1 tablet by mouth Every 12 (Twelve) Hours.  Dispense: 60 tablet; Refill: 5        Return in about 4 weeks (around 5/6/2025) for Recheck.  Class 3 Severe Obesity (BMI >=40). Obesity-related health conditions include the following: obstructive sleep apnea, hypertension, diabetes mellitus, dyslipidemias, GERD, and osteoarthritis. Obesity is worsening. BMI is is above average; BMI management plan is completed. We discussed portion control and increasing exercise.            Continue Regimen: Wynzora cream QHS to hands and feet (patient denies stinging or burning)\\n\\nbetamethasone dipropionate 0.05 % topical ointment Bid for 2-4 weeks, then weekends only for maintenance\\n\\nT sal shampoo to scalp\\n\\n Cosentyx 300 mg QW x 5 weeks then Cosentyx 300mg Q4 wks - patient took first Cosentyx Dose of 300 mg two days ago Detail Level: Simple Render In Strict Bullet Format?: No Modify Regimen: Aquaphilic for maintenance on feet and more stubborn areas

## 2025-05-01 DIAGNOSIS — I48.91 ATRIAL FIBRILLATION WITH RVR: ICD-10-CM

## 2025-05-01 NOTE — TELEPHONE ENCOUNTER
Caller: Suzy Zamora    Relationship: Emergency Contact    Best call back number: [phone]     Requested Prescriptions:   Requested Prescriptions     Pending Prescriptions Disp Refills    apixaban (Eliquis) 5 MG tablet tablet 60 tablet 5     Sig: Take 1 tablet by mouth Every 12 (Twelve) Hours.        Pharmacy where request should be sent: [facility] [address], [place] - [address] [facility] [phone] [facility] [phone] FX     Last office visit with prescribing clinician: 4/8/2025   Last telemedicine visit with prescribing clinician: Visit date not found   Next office visit with prescribing clinician: Visit date not found     Additional details provided by patient:     Does the patient have less than a 3 day supply:  [x] Yes  [] No    Would you like a call back once the refill request has been completed: [] Yes [x] No    If the office needs to give you a call back, can they leave a voicemail: [x] Yes [] No    Cristhian Mcdonald   05/01/25 13:14 EDT

## 2025-06-16 ENCOUNTER — TELEPHONE (OUTPATIENT)
Dept: FAMILY MEDICINE CLINIC | Facility: CLINIC | Age: 50
End: 2025-06-16

## 2025-06-16 NOTE — TELEPHONE ENCOUNTER
Caller: Suzy Zamora    Relationship: Emergency Contact    Best call back number: [phone]        What was the call regarding: PATIENTS WIFE CALLING STATES PATIENT HAS KNOT ON TESTICLE THAT HE HAD TAKEN CARE OF LAST YEAR AT THIS SAME TIME PER WIFE     WIFE TRIED TO CALL UROLOGY T SEE DOCTOR BUT THEY CAN'T GET HIM IN TILL EARLY AUGUST AND APRN WOULD BE NEXT WEEK     WIFE WANTING TO KNOW IF DR PRESLEY COULD CALL AND GET A QUICKER APPOINTMENT FOR PATIENT DUE TO LAST TIME THIS HAPPENED THEY WERE IN EMERGENCY ROOM WITHIN THREE DAY OF TH SPOT APPEARING     PLEASE GIVE CALLBACK

## 2025-06-17 DIAGNOSIS — N50.89 SCROTAL MASS: Primary | ICD-10-CM

## 2025-06-17 NOTE — TELEPHONE ENCOUNTER
"That is unlikely to speed up the access to the urologist-we can order a scrotal ultrasound to further evaluate the \"knot\".  Let me know what he wants to do  "

## 2025-06-17 NOTE — TELEPHONE ENCOUNTER
Patient has a knot on his testicle (same as he did this same time last year). Patient's wife called the Urologist, but they cannot get him in until early August, she is asking if you can help to get him a sooner appointment? Possibly place an Urgent referral? Please advise.

## 2025-06-17 NOTE — TELEPHONE ENCOUNTER
Please place order for ultrasound per patient's wife, they will keep appointment with Urology also.

## 2025-06-18 DIAGNOSIS — E11.65 UNCONTROLLED TYPE 2 DIABETES MELLITUS WITH HYPERGLYCEMIA: ICD-10-CM

## 2025-06-18 RX ORDER — GLIMEPIRIDE 4 MG/1
4 TABLET ORAL
Qty: 90 TABLET | Refills: 0 | OUTPATIENT
Start: 2025-06-18

## 2025-06-27 DIAGNOSIS — E78.2 MIXED HYPERLIPIDEMIA: ICD-10-CM

## 2025-06-27 DIAGNOSIS — M15.0 PRIMARY OSTEOARTHRITIS INVOLVING MULTIPLE JOINTS: ICD-10-CM

## 2025-06-27 RX ORDER — ATORVASTATIN CALCIUM 40 MG/1
40 TABLET, FILM COATED ORAL DAILY
Qty: 90 TABLET | Refills: 1 | OUTPATIENT
Start: 2025-06-27

## 2025-06-27 RX ORDER — CELECOXIB 200 MG/1
200 CAPSULE ORAL 2 TIMES DAILY
Qty: 180 CAPSULE | Refills: 1 | OUTPATIENT
Start: 2025-06-27

## 2025-06-27 RX ORDER — CELECOXIB 200 MG/1
200 CAPSULE ORAL 2 TIMES DAILY
Qty: 180 CAPSULE | Refills: 0 | Status: SHIPPED | OUTPATIENT
Start: 2025-06-27

## 2025-07-28 DIAGNOSIS — I10 BENIGN ESSENTIAL HTN: ICD-10-CM

## 2025-07-28 DIAGNOSIS — E11.65 TYPE 2 DIABETES MELLITUS WITH HYPERGLYCEMIA, WITHOUT LONG-TERM CURRENT USE OF INSULIN: ICD-10-CM

## 2025-07-28 RX ORDER — METFORMIN HYDROCHLORIDE 500 MG/1
500 TABLET, EXTENDED RELEASE ORAL
Qty: 90 TABLET | Refills: 0 | Status: SHIPPED | OUTPATIENT
Start: 2025-07-28

## 2025-07-28 RX ORDER — LISINOPRIL 20 MG/1
20 TABLET ORAL DAILY
Qty: 90 TABLET | Refills: 0 | Status: SHIPPED | OUTPATIENT
Start: 2025-07-28

## 2025-07-30 DIAGNOSIS — F41.8 DEPRESSION WITH ANXIETY: ICD-10-CM

## 2025-07-30 RX ORDER — CITALOPRAM HYDROBROMIDE 40 MG/1
40 TABLET ORAL DAILY
Qty: 90 TABLET | Refills: 0 | Status: SHIPPED | OUTPATIENT
Start: 2025-07-30

## (undated) DEVICE — GW EMR FIX EXCHG J STD .035 3MM 260CM

## (undated) DEVICE — PK CATH CARD 40

## (undated) DEVICE — GW HITORQUE/BAL MID/WT J W/HCOAT .014 3X190CM

## (undated) DEVICE — CATH DIAG IMPULSE FR4 5F 100CM

## (undated) DEVICE — GLIDESHEATH BASIC HYDROPHILIC COATED INTRODUCER SHEATH: Brand: GLIDESHEATH

## (undated) DEVICE — CATH DIAG IMPULSE FL3.5 5F 100CM

## (undated) DEVICE — KT MANIFLD CARDIAC

## (undated) DEVICE — CATH4F INF PIG 145Â° MOD 110CM: Brand: INFINITI